# Patient Record
Sex: FEMALE | Race: WHITE | NOT HISPANIC OR LATINO | Employment: FULL TIME | URBAN - METROPOLITAN AREA
[De-identification: names, ages, dates, MRNs, and addresses within clinical notes are randomized per-mention and may not be internally consistent; named-entity substitution may affect disease eponyms.]

---

## 2023-03-09 DIAGNOSIS — E11.65 TYPE 2 DIABETES MELLITUS WITH HYPERGLYCEMIA, WITHOUT LONG-TERM CURRENT USE OF INSULIN (MULTI): ICD-10-CM

## 2023-03-09 RX ORDER — FLASH GLUCOSE SENSOR
1 KIT MISCELLANEOUS
COMMUNITY
Start: 2023-02-17 | End: 2023-03-09 | Stop reason: SDUPTHER

## 2023-03-09 RX ORDER — FLASH GLUCOSE SENSOR
1 KIT MISCELLANEOUS
Qty: 1 EACH | Refills: 3 | Status: SHIPPED | OUTPATIENT
Start: 2023-03-09 | End: 2023-05-22 | Stop reason: SDUPTHER

## 2023-05-09 DIAGNOSIS — Z00.00 HEALTHCARE MAINTENANCE: ICD-10-CM

## 2023-05-09 RX ORDER — GLIMEPIRIDE 1 MG/1
1 TABLET ORAL DAILY
COMMUNITY
End: 2023-05-09 | Stop reason: SDUPTHER

## 2023-05-09 RX ORDER — GLIMEPIRIDE 1 MG/1
1 TABLET ORAL DAILY
Qty: 90 TABLET | Refills: 1 | Status: SHIPPED | OUTPATIENT
Start: 2023-05-09 | End: 2023-12-06

## 2023-05-22 ENCOUNTER — TELEPHONE (OUTPATIENT)
Dept: PRIMARY CARE | Facility: CLINIC | Age: 48
End: 2023-05-22

## 2023-05-22 DIAGNOSIS — E11.65 TYPE 2 DIABETES MELLITUS WITH HYPERGLYCEMIA, WITHOUT LONG-TERM CURRENT USE OF INSULIN (MULTI): ICD-10-CM

## 2023-05-22 RX ORDER — FLASH GLUCOSE SENSOR
1 KIT MISCELLANEOUS
Qty: 1 EACH | Refills: 5 | Status: SHIPPED | OUTPATIENT
Start: 2023-05-22 | End: 2024-03-11 | Stop reason: SDUPTHER

## 2023-08-01 LAB
ALANINE AMINOTRANSFERASE (SGPT) (U/L) IN SER/PLAS: 30 U/L (ref 7–45)
ALBUMIN (G/DL) IN SER/PLAS: 4.2 G/DL (ref 3.4–5)
ALBUMIN (MG/L) IN URINE: 13.8 MG/L
ALBUMIN/CREATININE (UG/MG) IN URINE: 15.8 UG/MG CRT (ref 0–30)
ALKALINE PHOSPHATASE (U/L) IN SER/PLAS: 62 U/L (ref 33–110)
ANION GAP IN SER/PLAS: 14 MMOL/L (ref 10–20)
APPEARANCE, URINE: ABNORMAL
ASPARTATE AMINOTRANSFERASE (SGOT) (U/L) IN SER/PLAS: 19 U/L (ref 9–39)
BACTERIA, URINE: ABNORMAL /HPF
BASOPHILS (10*3/UL) IN BLOOD BY AUTOMATED COUNT: 0.06 X10E9/L (ref 0–0.1)
BASOPHILS/100 LEUKOCYTES IN BLOOD BY AUTOMATED COUNT: 0.6 % (ref 0–2)
BILIRUBIN TOTAL (MG/DL) IN SER/PLAS: 0.3 MG/DL (ref 0–1.2)
BILIRUBIN, URINE: NEGATIVE
BLOOD, URINE: ABNORMAL
CALCIUM (MG/DL) IN SER/PLAS: 9.3 MG/DL (ref 8.6–10.3)
CARBON DIOXIDE, TOTAL (MMOL/L) IN SER/PLAS: 25 MMOL/L (ref 21–32)
CHLORIDE (MMOL/L) IN SER/PLAS: 104 MMOL/L (ref 98–107)
CHOLESTEROL (MG/DL) IN SER/PLAS: 167 MG/DL (ref 0–199)
CHOLESTEROL IN HDL (MG/DL) IN SER/PLAS: 34 MG/DL
CHOLESTEROL/HDL RATIO: 4.9
COLOR, URINE: YELLOW
CREATININE (MG/DL) IN SER/PLAS: 0.83 MG/DL (ref 0.5–1.05)
CREATININE (MG/DL) IN URINE: 87.2 MG/DL (ref 20–320)
EOSINOPHILS (10*3/UL) IN BLOOD BY AUTOMATED COUNT: 0.49 X10E9/L (ref 0–0.7)
EOSINOPHILS/100 LEUKOCYTES IN BLOOD BY AUTOMATED COUNT: 4.9 % (ref 0–6)
ERYTHROCYTE DISTRIBUTION WIDTH (RATIO) BY AUTOMATED COUNT: 15.2 % (ref 11.5–14.5)
ERYTHROCYTE MEAN CORPUSCULAR HEMOGLOBIN CONCENTRATION (G/DL) BY AUTOMATED: 28.3 G/DL (ref 32–36)
ERYTHROCYTE MEAN CORPUSCULAR VOLUME (FL) BY AUTOMATED COUNT: 76 FL (ref 80–100)
ERYTHROCYTES (10*6/UL) IN BLOOD BY AUTOMATED COUNT: 4.93 X10E12/L (ref 4–5.2)
ESTIMATED AVERAGE GLUCOSE FOR HBA1C: 154 MG/DL
GFR FEMALE: 86 ML/MIN/1.73M2
GLUCOSE (MG/DL) IN SER/PLAS: 118 MG/DL (ref 74–99)
GLUCOSE, URINE: NEGATIVE MG/DL
HEMATOCRIT (%) IN BLOOD BY AUTOMATED COUNT: 37.5 % (ref 36–46)
HEMOGLOBIN (G/DL) IN BLOOD: 10.6 G/DL (ref 12–16)
HEMOGLOBIN A1C/HEMOGLOBIN TOTAL IN BLOOD: 7 %
IMMATURE GRANULOCYTES/100 LEUKOCYTES IN BLOOD BY AUTOMATED COUNT: 0.4 % (ref 0–0.9)
KETONES, URINE: NEGATIVE MG/DL
LDL: 57 MG/DL (ref 0–99)
LEUKOCYTE ESTERASE, URINE: ABNORMAL
LEUKOCYTES (10*3/UL) IN BLOOD BY AUTOMATED COUNT: 10.1 X10E9/L (ref 4.4–11.3)
LYMPHOCYTES (10*3/UL) IN BLOOD BY AUTOMATED COUNT: 2.2 X10E9/L (ref 1.2–4.8)
LYMPHOCYTES/100 LEUKOCYTES IN BLOOD BY AUTOMATED COUNT: 21.8 % (ref 13–44)
MONOCYTES (10*3/UL) IN BLOOD BY AUTOMATED COUNT: 0.42 X10E9/L (ref 0.1–1)
MONOCYTES/100 LEUKOCYTES IN BLOOD BY AUTOMATED COUNT: 4.2 % (ref 2–10)
MUCUS, URINE: ABNORMAL /LPF
NEUTROPHILS (10*3/UL) IN BLOOD BY AUTOMATED COUNT: 6.86 X10E9/L (ref 1.2–7.7)
NEUTROPHILS/100 LEUKOCYTES IN BLOOD BY AUTOMATED COUNT: 68.1 % (ref 40–80)
NITRITE, URINE: NEGATIVE
NON HDL CHOLESTEROL: 133 MG/DL
PH, URINE: 5 (ref 5–8)
PLATELETS (10*3/UL) IN BLOOD AUTOMATED COUNT: 370 X10E9/L (ref 150–450)
POTASSIUM (MMOL/L) IN SER/PLAS: 4.1 MMOL/L (ref 3.5–5.3)
PROTEIN TOTAL: 6.4 G/DL (ref 6.4–8.2)
PROTEIN, URINE: NEGATIVE MG/DL
RBC, URINE: 2 /HPF (ref 0–5)
SODIUM (MMOL/L) IN SER/PLAS: 139 MMOL/L (ref 136–145)
SPECIFIC GRAVITY, URINE: 1.01 (ref 1–1.03)
SQUAMOUS EPITHELIAL CELLS, URINE: 20 /HPF
THYROTROPIN (MIU/L) IN SER/PLAS BY DETECTION LIMIT <= 0.05 MIU/L: 1.65 MIU/L (ref 0.44–3.98)
TRIGLYCERIDE (MG/DL) IN SER/PLAS: 380 MG/DL (ref 0–149)
UREA NITROGEN (MG/DL) IN SER/PLAS: 12 MG/DL (ref 6–23)
UROBILINOGEN, URINE: <2 MG/DL (ref 0–1.9)
VLDL: 76 MG/DL (ref 0–40)
WBC, URINE: 5 /HPF (ref 0–5)

## 2023-08-03 ENCOUNTER — OFFICE VISIT (OUTPATIENT)
Dept: PRIMARY CARE | Facility: CLINIC | Age: 48
End: 2023-08-03
Payer: COMMERCIAL

## 2023-08-03 VITALS
DIASTOLIC BLOOD PRESSURE: 82 MMHG | WEIGHT: 195.2 LBS | HEIGHT: 65 IN | BODY MASS INDEX: 32.52 KG/M2 | HEART RATE: 80 BPM | SYSTOLIC BLOOD PRESSURE: 134 MMHG

## 2023-08-03 DIAGNOSIS — Z23 NEED FOR PNEUMOCOCCAL VACCINATION: ICD-10-CM

## 2023-08-03 DIAGNOSIS — E11.65 TYPE 2 DIABETES MELLITUS WITH HYPERGLYCEMIA, WITHOUT LONG-TERM CURRENT USE OF INSULIN (MULTI): ICD-10-CM

## 2023-08-03 DIAGNOSIS — Z23 NEED FOR SHINGLES VACCINE: ICD-10-CM

## 2023-08-03 DIAGNOSIS — J30.2 SEASONAL ALLERGIES: ICD-10-CM

## 2023-08-03 DIAGNOSIS — R25.2 LEG CRAMPS: ICD-10-CM

## 2023-08-03 DIAGNOSIS — D64.9 ANEMIA, UNSPECIFIED TYPE: Primary | ICD-10-CM

## 2023-08-03 DIAGNOSIS — Z12.31 SCREENING MAMMOGRAM FOR BREAST CANCER: ICD-10-CM

## 2023-08-03 PROBLEM — K44.9 HIATAL HERNIA WITH GERD: Status: ACTIVE | Noted: 2023-08-03

## 2023-08-03 PROBLEM — K21.9 HIATAL HERNIA WITH GERD: Status: ACTIVE | Noted: 2023-08-03

## 2023-08-03 PROBLEM — Z86.59 HISTORY OF DEPRESSION: Status: ACTIVE | Noted: 2023-08-03

## 2023-08-03 PROBLEM — E78.5 HYPERLIPIDEMIA: Status: ACTIVE | Noted: 2023-08-03

## 2023-08-03 PROCEDURE — 99214 OFFICE O/P EST MOD 30 MIN: CPT | Performed by: FAMILY MEDICINE

## 2023-08-03 PROCEDURE — 3051F HG A1C>EQUAL 7.0%<8.0%: CPT | Performed by: FAMILY MEDICINE

## 2023-08-03 PROCEDURE — 4010F ACE/ARB THERAPY RXD/TAKEN: CPT | Performed by: FAMILY MEDICINE

## 2023-08-03 PROCEDURE — 3079F DIAST BP 80-89 MM HG: CPT | Performed by: FAMILY MEDICINE

## 2023-08-03 PROCEDURE — 90677 PCV20 VACCINE IM: CPT | Performed by: FAMILY MEDICINE

## 2023-08-03 PROCEDURE — 90472 IMMUNIZATION ADMIN EACH ADD: CPT | Performed by: FAMILY MEDICINE

## 2023-08-03 PROCEDURE — 3075F SYST BP GE 130 - 139MM HG: CPT | Performed by: FAMILY MEDICINE

## 2023-08-03 PROCEDURE — 1036F TOBACCO NON-USER: CPT | Performed by: FAMILY MEDICINE

## 2023-08-03 PROCEDURE — 90471 IMMUNIZATION ADMIN: CPT | Performed by: FAMILY MEDICINE

## 2023-08-03 PROCEDURE — 90750 HZV VACC RECOMBINANT IM: CPT | Performed by: FAMILY MEDICINE

## 2023-08-03 RX ORDER — CETIRIZINE HYDROCHLORIDE 10 MG/1
10 TABLET ORAL DAILY
COMMUNITY
End: 2023-08-03 | Stop reason: SDUPTHER

## 2023-08-03 RX ORDER — CETIRIZINE HYDROCHLORIDE 10 MG/1
10 TABLET ORAL DAILY
Qty: 90 TABLET | Refills: 3 | Status: SHIPPED | OUTPATIENT
Start: 2023-08-03 | End: 2023-12-20 | Stop reason: SDUPTHER

## 2023-08-03 RX ORDER — ALBUTEROL SULFATE 90 UG/1
AEROSOL, METERED RESPIRATORY (INHALATION)
COMMUNITY
End: 2024-03-22 | Stop reason: WASHOUT

## 2023-08-03 ASSESSMENT — ENCOUNTER SYMPTOMS: SHORTNESS OF BREATH: 0

## 2023-08-03 NOTE — PATIENT INSTRUCTIONS
Routine followup six months, labs prior.   Please call any concerns.   Shingrix number one today, Prevnar 20 today.  Anticipate shingrix number two at six month visit.  Labs today for anemia workup and magnesium level, will call results and further instructions.    FOBT dispensed.

## 2023-08-03 NOTE — PROGRESS NOTES
Patient presents for periodic surveillance of chronic medical problems.     Subjective  Nubia Martinez is a 48 y.o. female who presents for Annual Exam.  HPI  Having troubles with leg cramps. Mostly in the morning while in bed.  Discussed hydration, calcium/magnesium supplements, tonic water help some people.  Will check magnesium level as well.   Interested in having all her vitamin levels checked.  States has noticed decreased energy.  Sleep swells usually, does snore, does not feel well rested.  Recommend sleep testing, she declines for now. Regarding vitamin levels, advised will cost likely thousands as likely won't be covered by insurance.    DM well controlled A1c 7  Insect bites,states recurrent bites, thinks spider not sure what's biting her, was in ER once.  Itching and swelling lasts a week.    New anemia, mild, will workup, had colonoscopy 11/22.  Takes ibuprofen /aleve occasionally.  No blood in stool, no dark tarry stools noted.   Has pain in left shoulder at times, sleeps on that side. Will monitor.    Review of Systems   Respiratory:  Negative for shortness of breath.    Cardiovascular:  Negative for chest pain.   All other systems reviewed and are negative.  .    Objective     Visit Vitals  /82 (BP Location: Left arm, Patient Position: Sitting)   Pulse 80      Physical Exam  Vitals and nursing note reviewed.   Constitutional:       General: She is not in acute distress.     Appearance: Normal appearance. She is not toxic-appearing.   HENT:      Head: Normocephalic and atraumatic.   Cardiovascular:      Rate and Rhythm: Normal rate and regular rhythm.      Heart sounds: No murmur heard.  Pulmonary:      Effort: Pulmonary effort is normal.      Breath sounds: Normal breath sounds.   Abdominal:      Palpations: Abdomen is soft.   Musculoskeletal:      Cervical back: Neck supple. No rigidity.      Comments:     Skin:     General: Skin is warm and dry.   Neurological:      General: No focal deficit  present.      Mental Status: She is alert and oriented to person, place, and time.   Psychiatric:         Mood and Affect: Mood normal.         Behavior: Behavior normal.         Assessment/Plan   Problem List Items Addressed This Visit       Type 2 diabetes mellitus with hyperglycemia (CMS/HCC)    Relevant Orders    Follow Up In Primary Care - Established     Other Visit Diagnoses       Anemia, unspecified type    -  Primary    Relevant Orders    CBC and Auto Differential    Iron and TIBC    Vitamin B12    Leg cramps        Relevant Orders    Magnesium    Screening mammogram for breast cancer        Relevant Orders    BI mammo bilateral screening tomosynthesis    Need for shingles vaccine        Relevant Orders    Zoster vaccine, recombinant, adult (SHINGRIX)    Need for pneumococcal vaccination        Relevant Orders    Pneumococcal conjugate vaccine, 20-valent, adult (PREVNAR 20)                   Lisandra Denney MD

## 2023-08-10 ENCOUNTER — CLINICAL SUPPORT (OUTPATIENT)
Dept: PRIMARY CARE | Facility: CLINIC | Age: 48
End: 2023-08-10
Payer: COMMERCIAL

## 2023-08-10 ENCOUNTER — LAB (OUTPATIENT)
Dept: LAB | Facility: LAB | Age: 48
End: 2023-08-10
Payer: COMMERCIAL

## 2023-08-10 DIAGNOSIS — D64.9 ANEMIA, UNSPECIFIED TYPE: ICD-10-CM

## 2023-08-10 DIAGNOSIS — Z12.11 SCREENING FOR COLON CANCER: ICD-10-CM

## 2023-08-10 DIAGNOSIS — R25.2 LEG CRAMPS: ICD-10-CM

## 2023-08-10 LAB
BASOPHILS (10*3/UL) IN BLOOD BY AUTOMATED COUNT: 0.07 X10E9/L (ref 0–0.1)
BASOPHILS/100 LEUKOCYTES IN BLOOD BY AUTOMATED COUNT: 0.7 % (ref 0–2)
COBALAMIN (VITAMIN B12) (PG/ML) IN SER/PLAS: 181 PG/ML (ref 211–911)
EOSINOPHILS (10*3/UL) IN BLOOD BY AUTOMATED COUNT: 0.35 X10E9/L (ref 0–0.7)
EOSINOPHILS/100 LEUKOCYTES IN BLOOD BY AUTOMATED COUNT: 3.4 % (ref 0–6)
ERYTHROCYTE DISTRIBUTION WIDTH (RATIO) BY AUTOMATED COUNT: 15.9 % (ref 11.5–14.5)
ERYTHROCYTE MEAN CORPUSCULAR HEMOGLOBIN CONCENTRATION (G/DL) BY AUTOMATED: 28.7 G/DL (ref 32–36)
ERYTHROCYTE MEAN CORPUSCULAR VOLUME (FL) BY AUTOMATED COUNT: 76 FL (ref 80–100)
ERYTHROCYTES (10*6/UL) IN BLOOD BY AUTOMATED COUNT: 4.71 X10E12/L (ref 4–5.2)
HEMATOCRIT (%) IN BLOOD BY AUTOMATED COUNT: 35.6 % (ref 36–46)
HEMOGLOBIN (G/DL) IN BLOOD: 10.2 G/DL (ref 12–16)
IMMATURE GRANULOCYTES/100 LEUKOCYTES IN BLOOD BY AUTOMATED COUNT: 0.5 % (ref 0–0.9)
IRON (UG/DL) IN SER/PLAS: 25 UG/DL (ref 35–150)
IRON BINDING CAPACITY (UG/DL) IN SER/PLAS: 429 UG/DL (ref 240–445)
IRON SATURATION (%) IN SER/PLAS: 6 % (ref 25–45)
LEUKOCYTES (10*3/UL) IN BLOOD BY AUTOMATED COUNT: 10.3 X10E9/L (ref 4.4–11.3)
LYMPHOCYTES (10*3/UL) IN BLOOD BY AUTOMATED COUNT: 2.11 X10E9/L (ref 1.2–4.8)
LYMPHOCYTES/100 LEUKOCYTES IN BLOOD BY AUTOMATED COUNT: 20.5 % (ref 13–44)
MAGNESIUM (MG/DL) IN SER/PLAS: 1.85 MG/DL (ref 1.6–2.4)
MONOCYTES (10*3/UL) IN BLOOD BY AUTOMATED COUNT: 0.61 X10E9/L (ref 0.1–1)
MONOCYTES/100 LEUKOCYTES IN BLOOD BY AUTOMATED COUNT: 5.9 % (ref 2–10)
NEUTROPHILS (10*3/UL) IN BLOOD BY AUTOMATED COUNT: 7.08 X10E9/L (ref 1.2–7.7)
NEUTROPHILS/100 LEUKOCYTES IN BLOOD BY AUTOMATED COUNT: 69 % (ref 40–80)
PLATELETS (10*3/UL) IN BLOOD AUTOMATED COUNT: 321 X10E9/L (ref 150–450)
POC FECAL OCCULT BLOOD IMMUNOASSAY: NEGATIVE

## 2023-08-10 PROCEDURE — 83540 ASSAY OF IRON: CPT

## 2023-08-10 PROCEDURE — 36415 COLL VENOUS BLD VENIPUNCTURE: CPT

## 2023-08-10 PROCEDURE — 82607 VITAMIN B-12: CPT

## 2023-08-10 PROCEDURE — 83735 ASSAY OF MAGNESIUM: CPT

## 2023-08-10 PROCEDURE — 85025 COMPLETE CBC W/AUTO DIFF WBC: CPT

## 2023-08-10 PROCEDURE — 83550 IRON BINDING TEST: CPT

## 2023-08-10 PROCEDURE — 82274 ASSAY TEST FOR BLOOD FECAL: CPT | Performed by: FAMILY MEDICINE

## 2023-08-22 ENCOUNTER — TELEPHONE (OUTPATIENT)
Dept: PRIMARY CARE | Facility: CLINIC | Age: 48
End: 2023-08-22
Payer: COMMERCIAL

## 2023-08-22 NOTE — TELEPHONE ENCOUNTER
LM stating she got the message from PENNIE in the portal and started the iron and B12. States her menses started recently and is very heavy; states has been heavy lately. States she was doing some reading and read about menorrhagia and how it can cause anemia; wonders if this might be the case with her? Wonders if she needs to see a GYN?

## 2023-08-22 NOTE — TELEPHONE ENCOUNTER
Yes, that is a possible cause, she should see GYN.  I show she sees Dr Guillen so she can just call and make an appt for herself.

## 2023-09-13 ENCOUNTER — LAB (OUTPATIENT)
Dept: LAB | Facility: LAB | Age: 48
End: 2023-09-13
Payer: COMMERCIAL

## 2023-09-13 DIAGNOSIS — D64.9 ANEMIA, UNSPECIFIED TYPE: ICD-10-CM

## 2023-09-13 LAB
BASOPHILS (10*3/UL) IN BLOOD BY AUTOMATED COUNT: 0.04 X10E9/L (ref 0–0.1)
BASOPHILS/100 LEUKOCYTES IN BLOOD BY AUTOMATED COUNT: 0.4 % (ref 0–2)
COBALAMIN (VITAMIN B12) (PG/ML) IN SER/PLAS: 345 PG/ML (ref 211–911)
EOSINOPHILS (10*3/UL) IN BLOOD BY AUTOMATED COUNT: 0.41 X10E9/L (ref 0–0.7)
EOSINOPHILS/100 LEUKOCYTES IN BLOOD BY AUTOMATED COUNT: 4.5 % (ref 0–6)
ERYTHROCYTE DISTRIBUTION WIDTH (RATIO) BY AUTOMATED COUNT: 17.8 % (ref 11.5–14.5)
ERYTHROCYTE MEAN CORPUSCULAR HEMOGLOBIN CONCENTRATION (G/DL) BY AUTOMATED: 28.5 G/DL (ref 32–36)
ERYTHROCYTE MEAN CORPUSCULAR VOLUME (FL) BY AUTOMATED COUNT: 76 FL (ref 80–100)
ERYTHROCYTES (10*6/UL) IN BLOOD BY AUTOMATED COUNT: 4.74 X10E12/L (ref 4–5.2)
HEMATOCRIT (%) IN BLOOD BY AUTOMATED COUNT: 36.2 % (ref 36–46)
HEMOGLOBIN (G/DL) IN BLOOD: 10.3 G/DL (ref 12–16)
IMMATURE GRANULOCYTES/100 LEUKOCYTES IN BLOOD BY AUTOMATED COUNT: 0.3 % (ref 0–0.9)
IRON (UG/DL) IN SER/PLAS: 35 UG/DL (ref 35–150)
IRON BINDING CAPACITY (UG/DL) IN SER/PLAS: 421 UG/DL (ref 240–445)
IRON SATURATION (%) IN SER/PLAS: 8 % (ref 25–45)
LEUKOCYTES (10*3/UL) IN BLOOD BY AUTOMATED COUNT: 9.2 X10E9/L (ref 4.4–11.3)
LYMPHOCYTES (10*3/UL) IN BLOOD BY AUTOMATED COUNT: 2.14 X10E9/L (ref 1.2–4.8)
LYMPHOCYTES/100 LEUKOCYTES IN BLOOD BY AUTOMATED COUNT: 23.3 % (ref 13–44)
MONOCYTES (10*3/UL) IN BLOOD BY AUTOMATED COUNT: 0.47 X10E9/L (ref 0.1–1)
MONOCYTES/100 LEUKOCYTES IN BLOOD BY AUTOMATED COUNT: 5.1 % (ref 2–10)
NEUTROPHILS (10*3/UL) IN BLOOD BY AUTOMATED COUNT: 6.11 X10E9/L (ref 1.2–7.7)
NEUTROPHILS/100 LEUKOCYTES IN BLOOD BY AUTOMATED COUNT: 66.4 % (ref 40–80)
PLATELETS (10*3/UL) IN BLOOD AUTOMATED COUNT: 335 X10E9/L (ref 150–450)

## 2023-09-13 PROCEDURE — 83540 ASSAY OF IRON: CPT

## 2023-09-13 PROCEDURE — 82607 VITAMIN B-12: CPT

## 2023-09-13 PROCEDURE — 85025 COMPLETE CBC W/AUTO DIFF WBC: CPT

## 2023-09-13 PROCEDURE — 36415 COLL VENOUS BLD VENIPUNCTURE: CPT

## 2023-09-13 PROCEDURE — 83550 IRON BINDING TEST: CPT

## 2023-10-06 PROBLEM — Z86.59 PERSONAL HISTORY OF AFFECTIVE DISORDER: Status: ACTIVE | Noted: 2023-10-06

## 2023-10-06 PROBLEM — Z87.19 HISTORY OF ESOPHAGEAL STRICTURE: Status: ACTIVE | Noted: 2023-10-06

## 2023-10-06 PROBLEM — N92.0 MENORRHAGIA: Status: ACTIVE | Noted: 2023-10-06

## 2023-10-06 RX ORDER — MONTELUKAST SODIUM 10 MG/1
10 TABLET ORAL DAILY
COMMUNITY
End: 2024-02-19

## 2023-10-06 RX ORDER — FLUTICASONE PROPIONATE 50 MCG
2 SPRAY, SUSPENSION (ML) NASAL DAILY
COMMUNITY

## 2023-10-06 RX ORDER — DROSPIRENONE AND ETHINYL ESTRADIOL 0.03MG-3MG
1 KIT ORAL DAILY
COMMUNITY
Start: 2023-09-13 | End: 2023-10-27 | Stop reason: SDUPTHER

## 2023-10-09 ENCOUNTER — PREP FOR PROCEDURE (OUTPATIENT)
Dept: OBSTETRICS AND GYNECOLOGY | Facility: CLINIC | Age: 48
End: 2023-10-09

## 2023-10-09 ENCOUNTER — OFFICE VISIT (OUTPATIENT)
Dept: OBSTETRICS AND GYNECOLOGY | Facility: CLINIC | Age: 48
End: 2023-10-09
Payer: COMMERCIAL

## 2023-10-09 ENCOUNTER — HOSPITAL ENCOUNTER (OUTPATIENT)
Facility: HOSPITAL | Age: 48
Setting detail: OUTPATIENT SURGERY
End: 2023-10-09
Attending: OBSTETRICS & GYNECOLOGY | Admitting: OBSTETRICS & GYNECOLOGY
Payer: COMMERCIAL

## 2023-10-09 VITALS
SYSTOLIC BLOOD PRESSURE: 108 MMHG | HEIGHT: 65 IN | DIASTOLIC BLOOD PRESSURE: 86 MMHG | BODY MASS INDEX: 31.16 KG/M2 | WEIGHT: 187 LBS

## 2023-10-09 DIAGNOSIS — N92.0 MENORRHAGIA WITH REGULAR CYCLE: Primary | ICD-10-CM

## 2023-10-09 PROCEDURE — 1036F TOBACCO NON-USER: CPT | Performed by: OBSTETRICS & GYNECOLOGY

## 2023-10-09 PROCEDURE — 3051F HG A1C>EQUAL 7.0%<8.0%: CPT | Performed by: OBSTETRICS & GYNECOLOGY

## 2023-10-09 PROCEDURE — 4010F ACE/ARB THERAPY RXD/TAKEN: CPT | Performed by: OBSTETRICS & GYNECOLOGY

## 2023-10-09 PROCEDURE — 99213 OFFICE O/P EST LOW 20 MIN: CPT | Performed by: OBSTETRICS & GYNECOLOGY

## 2023-10-09 PROCEDURE — 3074F SYST BP LT 130 MM HG: CPT | Performed by: OBSTETRICS & GYNECOLOGY

## 2023-10-09 PROCEDURE — 3079F DIAST BP 80-89 MM HG: CPT | Performed by: OBSTETRICS & GYNECOLOGY

## 2023-10-09 RX ORDER — CELECOXIB 50 MG/1
400 CAPSULE ORAL ONCE
Status: DISCONTINUED | OUTPATIENT
Start: 2023-10-09 | End: 2024-03-04

## 2023-10-09 RX ORDER — ACETAMINOPHEN 325 MG/1
975 TABLET ORAL ONCE
Status: DISCONTINUED | OUTPATIENT
Start: 2023-10-09 | End: 2024-03-04

## 2023-10-09 RX ORDER — GABAPENTIN 600 MG/1
600 TABLET ORAL ONCE
Status: DISCONTINUED | OUTPATIENT
Start: 2023-10-09 | End: 2024-03-04

## 2023-10-09 NOTE — H&P (VIEW-ONLY)
Nubia Martinez is a 48 y.o. year old female patient.     Chief Complaint   Patient presents with    Pre-op Visit     Patient presents today for preop visit for hysteroscopy, dilatation and curettage and endometrial ablation.          HPI   Presents for preop visit in anticipation of endometrial ablation. She voices no complaints and is doing well. Denies any bowel or bladder problems. Denies any breast problems.  had a vasectomy.  Patient was started on birth control pills in anticipation of the ablation.  Endometrial biopsy could not be performed in the office several weeks ago.  She is currently on her menstrual flow as she is on the placebo week.    OB History          2    Para   2    Term                AB        Living   2         SAB        IAB        Ectopic        Multiple        Live Births                     Past Medical History:   Diagnosis Date    Encounter for  delivery without indication     Delivery of pregnancy by  section    Encounter for gynecological examination (general) (routine) without abnormal findings 2021    Pap test, as part of routine gynecological examination    Hyperlipidemia, unspecified 10/20/2022    Hyperlipidemia    Other conditions influencing health status     Menstruation    Other recurrent depressive disorders (CMS/[facility]) 01/15/2020    Seasonal affective disorder    Other specified abnormal findings of blood chemistry 2022    Low vitamin D level       Past Surgical History:   Procedure Laterality Date    OTHER SURGICAL HISTORY  2020     section    OTHER SURGICAL HISTORY  2020    Appendectomy    OTHER SURGICAL HISTORY  2020    Esophagogastroduodenoscopy    OTHER SURGICAL HISTORY  2020    Hysteroscopy       Review of Systems:   Constitutional: No fever or chills  Respiratory: No shortness of breath, or cough  Cardiovascular: No chest pain or syncope  Breasts: No breast pain, no masses, no nipple  discharge  Gastrointestinal: No nausea, vomiting, or diarrhea, no abdominal pain  Genitourinary: No dysuria or frequency  Gynecology: Negative except as noted in history of present illness  All other: All other systems reviewed and negative for complaint    Medication Documentation Review Audit       Reviewed by Alf Guillen MD (Physician) on 10/09/23 at 1146      Medication Order Taking? Sig Documenting Provider Last Dose Status   albuterol 90 mcg/actuation inhaler [id number] Yes INHALE 2 PUFFS FOUR TIMES DAILY AS NEEDED Historical Provider, MD  Active   atorvastatin (Lipitor) 40 mg tablet [id number] Yes TAKE ONE TABLET BY MOUTH ONCE DAILY Lisandra Denney MD  Active   cetirizine (ZyrTEC) 10 mg tablet [id number] Yes Take 1 tablet (10 mg) by mouth once daily.   Patient taking differently: Take 1 tablet (10 mg) by mouth once daily as needed.    Lisandra Denney MD  Active   drospirenone-ethinyl estradioL (Lula, Ocella) 3-0.03 mg tablet [id number] Yes Take 1 tablet by mouth once daily. Historical Provider, MD  Active   fluticasone (Flonase) 50 mcg/actuation nasal spray [id number] Yes Administer 2 sprays into each nostril once daily. Shake gently. Before first use, prime pump. After use, clean tip and replace cap. Historical Provider, MD  Active   FreeStyle Marciano sensor system (FreeStyle Marciano 14 Day Sensor) kit [id number]  Inject 1 each under the skin every 14 (fourteen) days. Lisandra Denney MD  Active   glimepiride (Amaryl) 1 mg tablet [id number] Yes Take 1 tablet (1 mg) by mouth once daily. Lisandra Denney MD  Active   losartan (Cozaar) 25 mg tablet [id number] Yes TAKE ONE TABLET BY MOUTH ONCE DAILY AS DIRECTED Lisandra Denney MD  Active   metFORMIN XR (Glucophage-XR) 500 mg 24 hr tablet [id number] Yes TAKE FOUR TABLETS BY MOUTH ONCE DAILY Lisandra Denney MD  Active   montelukast (Singulair) 10 mg tablet [id number] Yes Take 1 tablet (10 mg) by mouth once daily. Historical Provider, MD  Active   omeprazole (PriLOSEC) 40 mg DR capsule  "[id number] Yes TAKE ONE CAPSULE BY MOUTH ONCE DAILY Lisandra Denney MD  Active                     /86   Ht 1.651 m (5' 5\")   Wt 84.8 kg (187 lb)   LMP 10/07/2023   BMI 31.12 kg/m²     PHYSICAL EXAMINATION:  Well-developed, well nourished, in no acute distress, alert and oriented x three, is pleasant and cooperative.   HEENT: Clear. Pupils equal, round and reactive to light and accommodation. Extraocular muscles are intact. Oral mucosa pink without exudate.   NECK: No lymphadenopathy, no thyromegaly.  BREASTS: Symmetric, no palpable masses. No nipple discharge or retraction.  LUNGS: Clear bilaterally.  HEART: Regular rate and rhythm without murmurs.  ABDOMEN: Normoactive bowel sounds, soft and nontender, no guarding or rebound tenderness, no CVA tenderness.  EXTREMITIES: No clubbing, cyanosis or edema.  NEUROLOGIC:  Cranial nerves II-XII grossly intact.    Lab Results   Component Value Date    WBC 9.2 09/13/2023    HGB 10.3 (L) 09/13/2023    HCT 36.2 09/13/2023     09/13/2023    CHOL 167 08/01/2023    TRIG 380 (H) 08/01/2023    HDL 34.0 (A) 08/01/2023    ALT 30 08/01/2023    AST 19 08/01/2023     08/01/2023    K 4.1 08/01/2023     08/01/2023    CREATININE 0.83 08/01/2023    BUN 12 08/01/2023    CO2 25 08/01/2023    TSH 1.65 08/01/2023    HGBA1C 7.0 (A) 08/01/2023       1.  Menorrhagia  Plan hysteroscopy, dilatation and curettage and endometrial ablation.  Patient informed of risks of surgery including risk of anesthesia, infection, bleeding, injury to internal organs including bowel, bladder or ureter, uterine perforation or fistula formation. Her questions were answered to her satisfaction and she agrees to undergo the procedure.  Patient to continue with the birth control pills up until the time of surgery.  Will obtain CBC and serum hCG days prior to surgery.  Plan to obtain endometrial curettings at time of surgery since the endometrial biopsy could not be performed in the office.    "

## 2023-10-09 NOTE — PROGRESS NOTES
Nubia Martinez is a 48 y.o. year old female patient.     Chief Complaint   Patient presents with    Pre-op Visit     Patient presents today for preop visit for hysteroscopy, dilatation and curettage and endometrial ablation.          HPI   Presents for preop visit in anticipation of endometrial ablation. She voices no complaints and is doing well. Denies any bowel or bladder problems. Denies any breast problems.  had a vasectomy.  Patient was started on birth control pills in anticipation of the ablation.  Endometrial biopsy could not be performed in the office several weeks ago.  She is currently on her menstrual flow as she is on the placebo week.    OB History          2    Para   2    Term                AB        Living   2         SAB        IAB        Ectopic        Multiple        Live Births                     Past Medical History:   Diagnosis Date    Encounter for  delivery without indication     Delivery of pregnancy by  section    Encounter for gynecological examination (general) (routine) without abnormal findings 2021    Pap test, as part of routine gynecological examination    Hyperlipidemia, unspecified 10/20/2022    Hyperlipidemia    Other conditions influencing health status     Menstruation    Other recurrent depressive disorders (CMS/[facility]) 01/15/2020    Seasonal affective disorder    Other specified abnormal findings of blood chemistry 2022    Low vitamin D level       Past Surgical History:   Procedure Laterality Date    OTHER SURGICAL HISTORY  2020     section    OTHER SURGICAL HISTORY  2020    Appendectomy    OTHER SURGICAL HISTORY  2020    Esophagogastroduodenoscopy    OTHER SURGICAL HISTORY  2020    Hysteroscopy       Review of Systems:   Constitutional: No fever or chills  Respiratory: No shortness of breath, or cough  Cardiovascular: No chest pain or syncope  Breasts: No breast pain, no masses, no nipple  discharge  Gastrointestinal: No nausea, vomiting, or diarrhea, no abdominal pain  Genitourinary: No dysuria or frequency  Gynecology: Negative except as noted in history of present illness  All other: All other systems reviewed and negative for complaint    Medication Documentation Review Audit       Reviewed by Alf Guillen MD (Physician) on 10/09/23 at 1146      Medication Order Taking? Sig Documenting Provider Last Dose Status   albuterol 90 mcg/actuation inhaler [id number] Yes INHALE 2 PUFFS FOUR TIMES DAILY AS NEEDED Historical Provider, MD  Active   atorvastatin (Lipitor) 40 mg tablet [id number] Yes TAKE ONE TABLET BY MOUTH ONCE DAILY Lisandra Denney MD  Active   cetirizine (ZyrTEC) 10 mg tablet [id number] Yes Take 1 tablet (10 mg) by mouth once daily.   Patient taking differently: Take 1 tablet (10 mg) by mouth once daily as needed.    Lisandra Denney MD  Active   drospirenone-ethinyl estradioL (Lula, Ocella) 3-0.03 mg tablet [id number] Yes Take 1 tablet by mouth once daily. Historical Provider, MD  Active   fluticasone (Flonase) 50 mcg/actuation nasal spray [id number] Yes Administer 2 sprays into each nostril once daily. Shake gently. Before first use, prime pump. After use, clean tip and replace cap. Historical Provider, MD  Active   FreeStyle Marciano sensor system (FreeStyle Marciano 14 Day Sensor) kit [id number]  Inject 1 each under the skin every 14 (fourteen) days. Lisandra Denney MD  Active   glimepiride (Amaryl) 1 mg tablet [id number] Yes Take 1 tablet (1 mg) by mouth once daily. Lisandra Denney MD  Active   losartan (Cozaar) 25 mg tablet [id number] Yes TAKE ONE TABLET BY MOUTH ONCE DAILY AS DIRECTED Lisandra Denney MD  Active   metFORMIN XR (Glucophage-XR) 500 mg 24 hr tablet [id number] Yes TAKE FOUR TABLETS BY MOUTH ONCE DAILY Lisandra Denney MD  Active   montelukast (Singulair) 10 mg tablet [id number] Yes Take 1 tablet (10 mg) by mouth once daily. Historical Provider, MD  Active   omeprazole (PriLOSEC) 40 mg DR capsule  "[id number] Yes TAKE ONE CAPSULE BY MOUTH ONCE DAILY Lisandra Denney MD  Active                     /86   Ht 1.651 m (5' 5\")   Wt 84.8 kg (187 lb)   LMP 10/07/2023   BMI 31.12 kg/m²     PHYSICAL EXAMINATION:  Well-developed, well nourished, in no acute distress, alert and oriented x three, is pleasant and cooperative.   HEENT: Clear. Pupils equal, round and reactive to light and accommodation. Extraocular muscles are intact. Oral mucosa pink without exudate.   NECK: No lymphadenopathy, no thyromegaly.  BREASTS: Symmetric, no palpable masses. No nipple discharge or retraction.  LUNGS: Clear bilaterally.  HEART: Regular rate and rhythm without murmurs.  ABDOMEN: Normoactive bowel sounds, soft and nontender, no guarding or rebound tenderness, no CVA tenderness.  EXTREMITIES: No clubbing, cyanosis or edema.  NEUROLOGIC:  Cranial nerves II-XII grossly intact.    Lab Results   Component Value Date    WBC 9.2 09/13/2023    HGB 10.3 (L) 09/13/2023    HCT 36.2 09/13/2023     09/13/2023    CHOL 167 08/01/2023    TRIG 380 (H) 08/01/2023    HDL 34.0 (A) 08/01/2023    ALT 30 08/01/2023    AST 19 08/01/2023     08/01/2023    K 4.1 08/01/2023     08/01/2023    CREATININE 0.83 08/01/2023    BUN 12 08/01/2023    CO2 25 08/01/2023    TSH 1.65 08/01/2023    HGBA1C 7.0 (A) 08/01/2023       1.  Menorrhagia  Plan hysteroscopy, dilatation and curettage and endometrial ablation.  Patient informed of risks of surgery including risk of anesthesia, infection, bleeding, injury to internal organs including bowel, bladder or ureter, uterine perforation or fistula formation. Her questions were answered to her satisfaction and she agrees to undergo the procedure.  Patient to continue with the birth control pills up until the time of surgery.  Will obtain CBC and serum hCG days prior to surgery.  Plan to obtain endometrial curettings at time of surgery since the endometrial biopsy could not be performed in the office.    "

## 2023-10-10 ENCOUNTER — PREP FOR PROCEDURE (OUTPATIENT)
Dept: OBSTETRICS AND GYNECOLOGY | Facility: CLINIC | Age: 48
End: 2023-10-10
Payer: COMMERCIAL

## 2023-10-10 DIAGNOSIS — N92.0 MENORRHAGIA WITH REGULAR CYCLE: Primary | ICD-10-CM

## 2023-10-10 RX ORDER — GABAPENTIN 600 MG/1
600 TABLET ORAL ONCE
Status: DISCONTINUED | OUTPATIENT
Start: 2023-10-10 | End: 2024-03-04

## 2023-10-10 RX ORDER — ACETAMINOPHEN 325 MG/1
975 TABLET ORAL ONCE
Status: DISCONTINUED | OUTPATIENT
Start: 2023-10-10 | End: 2024-03-04

## 2023-10-10 RX ORDER — CELECOXIB 50 MG/1
400 CAPSULE ORAL ONCE
Status: DISCONTINUED | OUTPATIENT
Start: 2023-10-10 | End: 2024-03-04

## 2023-10-11 ENCOUNTER — PREP FOR PROCEDURE (OUTPATIENT)
Dept: OBSTETRICS AND GYNECOLOGY | Facility: CLINIC | Age: 48
End: 2023-10-11

## 2023-10-11 ENCOUNTER — OFFICE VISIT (OUTPATIENT)
Dept: PRIMARY CARE | Facility: CLINIC | Age: 48
End: 2023-10-11
Payer: COMMERCIAL

## 2023-10-11 VITALS
DIASTOLIC BLOOD PRESSURE: 66 MMHG | HEART RATE: 88 BPM | SYSTOLIC BLOOD PRESSURE: 126 MMHG | HEIGHT: 65 IN | WEIGHT: 190 LBS | BODY MASS INDEX: 31.65 KG/M2

## 2023-10-11 DIAGNOSIS — R05.1 ACUTE COUGH: Primary | ICD-10-CM

## 2023-10-11 DIAGNOSIS — E11.65 TYPE 2 DIABETES MELLITUS WITH HYPERGLYCEMIA, WITHOUT LONG-TERM CURRENT USE OF INSULIN (MULTI): ICD-10-CM

## 2023-10-11 PROCEDURE — 99213 OFFICE O/P EST LOW 20 MIN: CPT | Performed by: FAMILY MEDICINE

## 2023-10-11 PROCEDURE — 3074F SYST BP LT 130 MM HG: CPT | Performed by: FAMILY MEDICINE

## 2023-10-11 PROCEDURE — 3051F HG A1C>EQUAL 7.0%<8.0%: CPT | Performed by: FAMILY MEDICINE

## 2023-10-11 PROCEDURE — 4010F ACE/ARB THERAPY RXD/TAKEN: CPT | Performed by: FAMILY MEDICINE

## 2023-10-11 PROCEDURE — 3078F DIAST BP <80 MM HG: CPT | Performed by: FAMILY MEDICINE

## 2023-10-11 PROCEDURE — 1036F TOBACCO NON-USER: CPT | Performed by: FAMILY MEDICINE

## 2023-10-11 RX ORDER — ACETAMINOPHEN 325 MG/1
975 TABLET ORAL ONCE
Status: DISCONTINUED | OUTPATIENT
Start: 2023-10-11 | End: 2024-03-04

## 2023-10-11 RX ORDER — CELECOXIB 50 MG/1
400 CAPSULE ORAL ONCE
Status: DISCONTINUED | OUTPATIENT
Start: 2023-10-11 | End: 2024-03-04

## 2023-10-11 RX ORDER — BUDESONIDE AND FORMOTEROL FUMARATE DIHYDRATE 80; 4.5 UG/1; UG/1
2 AEROSOL RESPIRATORY (INHALATION)
Qty: 1 EACH | Refills: 1 | Status: SHIPPED | OUTPATIENT
Start: 2023-10-11 | End: 2023-12-20 | Stop reason: WASHOUT

## 2023-10-11 RX ORDER — GABAPENTIN 600 MG/1
600 TABLET ORAL ONCE
Status: DISCONTINUED | OUTPATIENT
Start: 2023-10-11 | End: 2024-03-04

## 2023-10-11 NOTE — PATIENT INSTRUCTIONS
I think this is most likely allergic or viral.  Do not feel an antibiotic is going to help at this point.  Recommend resume flonase and monteleukast, stay on zyrtec, and if not improving add inhaled steroid as we discussed.

## 2023-10-11 NOTE — PROGRESS NOTES
"Patient presents for periodic surveillance of chronic medical problems.     Subjective  Nubia Martinez is a 48 y.o. female who presents for Cough.  HPI  Two weeks of not feeling well.  Cough. Started with itchy then sore throat.  Nasal congestion, watery eyes.  Cough persisted.  Started to feel better, then relapsed.  Felt \"frog\" in throat and cough.  Cough became productive.  Last night she coughed all night long. Dry cough now.   Feels slightly short of breath . Some sneezing.  Tends to get sick Jan/February, not this time of year.  Had influenza vaccine this season.  On zyrtec, not using flonase and singulair.  Has been using her inhaler twice daily  Saw DR Guillen this week for pre op for d and c and ablation 10/26.  Recommend get back on flonase and Singulair.  Recommend short course of oral steroids, she has not tolerated in the past with elevated glucoses.  Can try inhaled. steroid, advised more expensive.  Can resume flonase and singulair, and if not improving add the inhaled steroid.      Review of Systems   All other systems reviewed and are negative.  .    Objective     Visit Vitals  /66 (BP Location: Left arm, Patient Position: Sitting)   Pulse 88      Physical Exam  Vitals and nursing note reviewed.   Constitutional:       General: She is not in acute distress.     Appearance: Normal appearance. She is not toxic-appearing.   HENT:      Head: Normocephalic and atraumatic.      Mouth/Throat:      Pharynx: No oropharyngeal exudate or posterior oropharyngeal erythema.      Comments: Clear drainage  Cardiovascular:      Rate and Rhythm: Normal rate and regular rhythm.      Heart sounds: No murmur heard.  Pulmonary:      Effort: Pulmonary effort is normal.      Breath sounds: Normal breath sounds.   Musculoskeletal:      Cervical back: Neck supple. No rigidity.      Comments:     Skin:     Coloration: Skin is not pale.   Neurological:      General: No focal deficit present.      Mental Status: She is " alert and oriented to person, place, and time.   Psychiatric:         Mood and Affect: Mood normal.         Behavior: Behavior normal.         Assessment/Plan   Problem List Items Addressed This Visit       Type 2 diabetes mellitus with hyperglycemia (CMS/[facility])     Other Visit Diagnoses       Acute cough    -  Primary    Relevant Medications    budesonide-formoteroL (Symbicort) 80-4.5 mcg/actuation inhaler                   Lisandra Denney MD

## 2023-10-23 ENCOUNTER — LAB (OUTPATIENT)
Dept: LAB | Facility: LAB | Age: 48
End: 2023-10-23
Payer: COMMERCIAL

## 2023-10-23 DIAGNOSIS — N92.0 MENORRHAGIA WITH REGULAR CYCLE: ICD-10-CM

## 2023-10-23 LAB
ERYTHROCYTE [DISTWIDTH] IN BLOOD BY AUTOMATED COUNT: 16.9 % (ref 11.5–14.5)
HCG UR QL IA.RAPID: NEGATIVE
HCT VFR BLD AUTO: 38.4 % (ref 36–46)
HGB BLD-MCNC: 11.2 G/DL (ref 12–16)
MCH RBC QN AUTO: 22.4 PG (ref 26–34)
MCHC RBC AUTO-ENTMCNC: 29.2 G/DL (ref 32–36)
MCV RBC AUTO: 77 FL (ref 80–100)
NRBC BLD-RTO: 0 /100 WBCS (ref 0–0)
PLATELET # BLD AUTO: 335 X10*3/UL (ref 150–450)
PMV BLD AUTO: 9.7 FL (ref 7.5–11.5)
RBC # BLD AUTO: 4.99 X10*6/UL (ref 4–5.2)
WBC # BLD AUTO: 9.9 X10*3/UL (ref 4.4–11.3)

## 2023-10-23 PROCEDURE — 85027 COMPLETE CBC AUTOMATED: CPT

## 2023-10-23 PROCEDURE — 36415 COLL VENOUS BLD VENIPUNCTURE: CPT

## 2023-10-23 PROCEDURE — 81025 URINE PREGNANCY TEST: CPT

## 2023-10-25 ENCOUNTER — ANESTHESIA EVENT (OUTPATIENT)
Dept: OPERATING ROOM | Facility: HOSPITAL | Age: 48
End: 2023-10-25
Payer: COMMERCIAL

## 2023-10-25 RX ORDER — LANOLIN ALCOHOL/MO/W.PET/CERES
500 CREAM (GRAM) TOPICAL DAILY
COMMUNITY
End: 2024-03-22 | Stop reason: WASHOUT

## 2023-10-25 RX ORDER — LANOLIN ALCOHOL/MO/W.PET/CERES
100 CREAM (GRAM) TOPICAL DAILY
COMMUNITY

## 2023-10-25 NOTE — PREPROCEDURE INSTRUCTIONS
No outpatient medications have been marked as taking for the 10/26/23 encounter (Hospital Encounter).                       NPO Instructions:    Do not eat any food after midnight the night before your surgery/procedure.    Additional Instructions:     Will need . Arrive on 2nd floor at hospital at 0600 thursday

## 2023-10-26 ENCOUNTER — ANESTHESIA (OUTPATIENT)
Dept: OPERATING ROOM | Facility: HOSPITAL | Age: 48
End: 2023-10-26
Payer: COMMERCIAL

## 2023-10-26 ENCOUNTER — HOSPITAL ENCOUNTER (OUTPATIENT)
Facility: HOSPITAL | Age: 48
Setting detail: OUTPATIENT SURGERY
Discharge: HOME | End: 2023-10-26
Attending: OBSTETRICS & GYNECOLOGY | Admitting: OBSTETRICS & GYNECOLOGY
Payer: COMMERCIAL

## 2023-10-26 VITALS
WEIGHT: 188.93 LBS | RESPIRATION RATE: 15 BRPM | DIASTOLIC BLOOD PRESSURE: 74 MMHG | HEART RATE: 81 BPM | HEIGHT: 65 IN | SYSTOLIC BLOOD PRESSURE: 126 MMHG | TEMPERATURE: 97.6 F | OXYGEN SATURATION: 96 % | BODY MASS INDEX: 31.48 KG/M2

## 2023-10-26 DIAGNOSIS — N92.0 MENORRHAGIA WITH REGULAR CYCLE: Primary | ICD-10-CM

## 2023-10-26 PROCEDURE — 2500000004 HC RX 250 GENERAL PHARMACY W/ HCPCS (ALT 636 FOR OP/ED): Performed by: ANESTHESIOLOGY

## 2023-10-26 PROCEDURE — 58555 HYSTEROSCOPY DX SEP PROC: CPT | Performed by: OBSTETRICS & GYNECOLOGY

## 2023-10-26 PROCEDURE — 2500000001 HC RX 250 WO HCPCS SELF ADMINISTERED DRUGS (ALT 637 FOR MEDICARE OP): Performed by: OBSTETRICS & GYNECOLOGY

## 2023-10-26 PROCEDURE — 3700000001 HC GENERAL ANESTHESIA TIME - INITIAL BASE CHARGE: Performed by: OBSTETRICS & GYNECOLOGY

## 2023-10-26 PROCEDURE — 3700000002 HC GENERAL ANESTHESIA TIME - EACH INCREMENTAL 1 MINUTE: Performed by: OBSTETRICS & GYNECOLOGY

## 2023-10-26 PROCEDURE — 7100000009 HC PHASE TWO TIME - INITIAL BASE CHARGE: Performed by: OBSTETRICS & GYNECOLOGY

## 2023-10-26 PROCEDURE — 2720000007 HC OR 272 NO HCPCS: Performed by: OBSTETRICS & GYNECOLOGY

## 2023-10-26 PROCEDURE — 7100000001 HC RECOVERY ROOM TIME - INITIAL BASE CHARGE: Performed by: OBSTETRICS & GYNECOLOGY

## 2023-10-26 PROCEDURE — 3600000003 HC OR TIME - INITIAL BASE CHARGE - PROCEDURE LEVEL THREE: Performed by: OBSTETRICS & GYNECOLOGY

## 2023-10-26 PROCEDURE — 7100000010 HC PHASE TWO TIME - EACH INCREMENTAL 1 MINUTE: Performed by: OBSTETRICS & GYNECOLOGY

## 2023-10-26 PROCEDURE — 2500000005 HC RX 250 GENERAL PHARMACY W/O HCPCS: Performed by: ANESTHESIOLOGY

## 2023-10-26 PROCEDURE — 3600000008 HC OR TIME - EACH INCREMENTAL 1 MINUTE - PROCEDURE LEVEL THREE: Performed by: OBSTETRICS & GYNECOLOGY

## 2023-10-26 PROCEDURE — 7100000002 HC RECOVERY ROOM TIME - EACH INCREMENTAL 1 MINUTE: Performed by: OBSTETRICS & GYNECOLOGY

## 2023-10-26 RX ORDER — LIDOCAINE HYDROCHLORIDE 10 MG/ML
0.1 INJECTION, SOLUTION EPIDURAL; INFILTRATION; INTRACAUDAL; PERINEURAL ONCE
Status: DISCONTINUED | OUTPATIENT
Start: 2023-10-26 | End: 2023-10-26 | Stop reason: HOSPADM

## 2023-10-26 RX ORDER — FENTANYL CITRATE 50 UG/ML
INJECTION, SOLUTION INTRAMUSCULAR; INTRAVENOUS AS NEEDED
Status: DISCONTINUED | OUTPATIENT
Start: 2023-10-26 | End: 2023-10-26

## 2023-10-26 RX ORDER — ACETAMINOPHEN 325 MG/1
975 TABLET ORAL ONCE
Status: DISCONTINUED | OUTPATIENT
Start: 2023-10-26 | End: 2023-10-26 | Stop reason: HOSPADM

## 2023-10-26 RX ORDER — SODIUM CHLORIDE, SODIUM LACTATE, POTASSIUM CHLORIDE, CALCIUM CHLORIDE 600; 310; 30; 20 MG/100ML; MG/100ML; MG/100ML; MG/100ML
50 INJECTION, SOLUTION INTRAVENOUS CONTINUOUS
Status: DISCONTINUED | OUTPATIENT
Start: 2023-10-26 | End: 2023-10-26 | Stop reason: HOSPADM

## 2023-10-26 RX ORDER — ACETAMINOPHEN AND CODEINE PHOSPHATE 300; 30 MG/1; MG/1
1 TABLET ORAL EVERY 6 HOURS PRN
Qty: 10 TABLET | Refills: 0 | Status: SHIPPED | OUTPATIENT
Start: 2023-10-26 | End: 2023-10-26 | Stop reason: HOSPADM

## 2023-10-26 RX ORDER — MORPHINE SULFATE 4 MG/ML
4 INJECTION, SOLUTION INTRAMUSCULAR; INTRAVENOUS EVERY 5 MIN PRN
Status: DISCONTINUED | OUTPATIENT
Start: 2023-10-26 | End: 2023-10-26 | Stop reason: HOSPADM

## 2023-10-26 RX ORDER — PROMETHAZINE HYDROCHLORIDE 25 MG/ML
INJECTION, SOLUTION INTRAMUSCULAR; INTRAVENOUS AS NEEDED
Status: DISCONTINUED | OUTPATIENT
Start: 2023-10-26 | End: 2023-10-26

## 2023-10-26 RX ORDER — PROPOFOL 10 MG/ML
INJECTION, EMULSION INTRAVENOUS AS NEEDED
Status: DISCONTINUED | OUTPATIENT
Start: 2023-10-26 | End: 2023-10-26

## 2023-10-26 RX ORDER — ACETAMINOPHEN 325 MG/1
975 TABLET ORAL ONCE
Status: COMPLETED | OUTPATIENT
Start: 2023-10-26 | End: 2023-10-26

## 2023-10-26 RX ORDER — OXYCODONE HYDROCHLORIDE 5 MG/1
5 TABLET ORAL EVERY 4 HOURS PRN
Status: DISCONTINUED | OUTPATIENT
Start: 2023-10-26 | End: 2023-10-26 | Stop reason: HOSPADM

## 2023-10-26 RX ORDER — SODIUM CHLORIDE, SODIUM LACTATE, POTASSIUM CHLORIDE, CALCIUM CHLORIDE 600; 310; 30; 20 MG/100ML; MG/100ML; MG/100ML; MG/100ML
100 INJECTION, SOLUTION INTRAVENOUS CONTINUOUS
Status: DISCONTINUED | OUTPATIENT
Start: 2023-10-26 | End: 2023-10-26 | Stop reason: HOSPADM

## 2023-10-26 RX ORDER — CELECOXIB 200 MG/1
400 CAPSULE ORAL ONCE
Status: COMPLETED | OUTPATIENT
Start: 2023-10-26 | End: 2023-10-26

## 2023-10-26 RX ORDER — ONDANSETRON HYDROCHLORIDE 2 MG/ML
4 INJECTION, SOLUTION INTRAVENOUS ONCE
Status: COMPLETED | OUTPATIENT
Start: 2023-10-26 | End: 2023-10-26

## 2023-10-26 RX ORDER — ONDANSETRON HYDROCHLORIDE 2 MG/ML
4 INJECTION, SOLUTION INTRAVENOUS ONCE AS NEEDED
Status: DISCONTINUED | OUTPATIENT
Start: 2023-10-26 | End: 2023-10-26 | Stop reason: HOSPADM

## 2023-10-26 RX ORDER — GABAPENTIN 300 MG/1
600 CAPSULE ORAL ONCE
Status: COMPLETED | OUTPATIENT
Start: 2023-10-26 | End: 2023-10-26

## 2023-10-26 RX ORDER — LABETALOL HYDROCHLORIDE 5 MG/ML
5 INJECTION, SOLUTION INTRAVENOUS ONCE AS NEEDED
Status: DISCONTINUED | OUTPATIENT
Start: 2023-10-26 | End: 2023-10-26 | Stop reason: HOSPADM

## 2023-10-26 RX ORDER — MORPHINE SULFATE 4 MG/ML
2 INJECTION, SOLUTION INTRAMUSCULAR; INTRAVENOUS EVERY 5 MIN PRN
Status: DISCONTINUED | OUTPATIENT
Start: 2023-10-26 | End: 2023-10-26 | Stop reason: HOSPADM

## 2023-10-26 RX ADMIN — ONDANSETRON 4 MG: 2 INJECTION INTRAMUSCULAR; INTRAVENOUS at 07:05

## 2023-10-26 RX ADMIN — SODIUM CHLORIDE, POTASSIUM CHLORIDE, SODIUM LACTATE AND CALCIUM CHLORIDE 50 ML/HR: 600; 310; 30; 20 INJECTION, SOLUTION INTRAVENOUS at 06:49

## 2023-10-26 RX ADMIN — PROMETHAZINE HYDROCHLORIDE 6.25 MG: 25 INJECTION INTRAMUSCULAR; INTRAVENOUS at 07:38

## 2023-10-26 RX ADMIN — FENTANYL CITRATE 100 MCG: 50 INJECTION, SOLUTION INTRAMUSCULAR; INTRAVENOUS at 07:35

## 2023-10-26 RX ADMIN — Medication 20 MG: at 07:35

## 2023-10-26 RX ADMIN — PROPOFOL 100 MG: 10 INJECTION, EMULSION INTRAVENOUS at 07:35

## 2023-10-26 RX ADMIN — ACETAMINOPHEN 975 MG: 325 TABLET ORAL at 06:55

## 2023-10-26 RX ADMIN — CELECOXIB 400 MG: 200 CAPSULE ORAL at 07:16

## 2023-10-26 RX ADMIN — GABAPENTIN 600 MG: 300 CAPSULE ORAL at 07:18

## 2023-10-26 ASSESSMENT — PAIN - FUNCTIONAL ASSESSMENT
PAIN_FUNCTIONAL_ASSESSMENT: 0-10

## 2023-10-26 ASSESSMENT — PAIN SCALES - GENERAL
PAINLEVEL_OUTOF10: 0 - NO PAIN

## 2023-10-26 ASSESSMENT — COLUMBIA-SUICIDE SEVERITY RATING SCALE - C-SSRS
6. HAVE YOU EVER DONE ANYTHING, STARTED TO DO ANYTHING, OR PREPARED TO DO ANYTHING TO END YOUR LIFE?: NO
1. IN THE PAST MONTH, HAVE YOU WISHED YOU WERE DEAD OR WISHED YOU COULD GO TO SLEEP AND NOT WAKE UP?: NO
2. HAVE YOU ACTUALLY HAD ANY THOUGHTS OF KILLING YOURSELF?: NO

## 2023-10-26 NOTE — ANESTHESIA PREPROCEDURE EVALUATION
Patient: Nubia Martinez    Procedure Information       Date/Time: 10/26/23 0730    Procedure: HYSTEROSCOPY D&C ENDOMETRIAL ABLATION    Location: [facility] 03 / [facility] OR    Surgeons: Alf Guillen MD            Relevant Problems   Cardiovascular   (+) Hyperlipidemia      Endocrine   (+) Type 2 diabetes mellitus with hyperglycemia (CMS/HCC)      GI   (+) Hiatal hernia with GERD       Clinical information reviewed:   Tobacco  Allergies  Meds   Med Hx  Surg Hx  OB Status  Fam Hx  Soc   Hx        NPO/Void Status  Date of Last Liquid: 10/25/23  Time of Last Liquid: 2200  Date of Last Solid: 10/25/23  Time of Last Solid: 2200  Time of Last Void: 0530         Physical Exam    Airway  Mallampati: II  TM distance: >3 FB     Cardiovascular - normal exam     Dental    Pulmonary    Abdominal        Anesthesia Plan    ASA 2     general     intravenous induction   Anesthetic plan and risks discussed with patient.     Anesthesia Evaluation     Patient summary reviewed    Airway   Mallampati: II  TM distance: >3 FB  Dental      Pulmonary - negative ROS   Cardiovascular - negative ROS and normal exam    Neuro/Psych - negative ROS     GI/Hepatic/Renal    (+) hiatal hernia, GERD    Endo/Other    (+) diabetes mellitus type 2  Abdominal

## 2023-10-26 NOTE — OP NOTE
Hysteroscopy Operative Note     Date: 10/26/2023  OR Location: [facility] OR    Name: Nubia Martinez, : 1975, Age: 48 y.o., MRN: [id number], Sex: female    Diagnosis  Pre-op Diagnosis     * Menorrhagia with regular cycle [N92.0] Post-op Diagnosis     * Menorrhagia with regular cycle [N92.0]     Procedures  Hysteroscopy  34221 - ME HYSTEROSCOPY DIAGNOSTIC SEPARATE PROCEDURE      Surgeons      * Alf Guillen - Primary    Resident/Fellow/Other Assistant:  Surgeon(s) and Role:    Procedure Summary  Anesthesia: General  ASA: II  Anesthesia Staff: Anesthesiologist: Zen Bloom MD  Estimated Blood Loss: 1ml  Intra-op Medications: * No intraprocedure medications in log *           Anesthesia Record               Intraprocedure I/O Totals          Intake    Ketamine 0.00 mL    The total shown is the total volume documented since Anesthesia Start was filed.    Total Intake 0 mL          Specimen: No specimens collected     Staff:   Circulator: Andra Guevara RN  Scrub Person: Melanie Medina RN         Drains and/or Catheters: * None in log *    Tourniquet Times:         Implants:     Findings: Noted stenotic internal cervical os.  The appearance of the cervix has a cribriform appearance.  Unable to enter the uterine cavity secondary to cervical stenosis.    Indications: Nubia Martinez is an 48 y.o. female who is having surgery for Menorrhagia with regular cycle [N92.0].     The patient was seen in the preoperative area. The risks, benefits, complications, treatment options, non-operative alternatives, expected recovery and outcomes were discussed with the patient. The possibilities of reaction to medication, pulmonary aspiration, injury to surrounding structures, bleeding, recurrent infection, the need for additional procedures, failure to diagnose a condition, and creating a complication requiring transfusion or operation were discussed with the patient. The patient concurred with the proposed plan, giving informed  consent.  The site of surgery was properly noted/marked if necessary per policy. The patient has been actively warmed in preoperative area. Preoperative antibiotics are not indicated. Venous thrombosis prophylaxis are not indicated.    Procedure Details: Operative findings: Upper limits of normal sized anteverted uterus.  Noted cervical stenosis and the appearance in the endocervical canal has a cribriform appearance.  Unable to enter the uterine cavity secondary to cervical stenosis.    Procedure:   The patient was brought in to the operating room, and placed in the supine position. Sequential compression devices placed on the lower extremities. After an adequate level of general anesthesia, she was then placed in the low lithotomy position.  The perineum and vagina were prepped and draped in the usual sterile fashion.  Bimanual exam was performed, and above findings noted.  The urinary bladder was drained using a straight red Camargo catheter.  A weighted speculum was placed in the vagina.  The anterior vaginal wall was elevated.   The anterior lip of the cervix was grasped with an Allis clamp.  Unable to sound the uterus secondary to cervical stenosis..    The hysteroscope was then introduced to the cervix in order to try to visualize the internal cervical os.  The endocervical canal had a cribriform appearance and the internal cervical os could not be identified.  Therefore, unable to perform endometrial ablation secondary to cervical stenosis.  Uterine sound would only go to about 3 cm into the endocervical canal.  Therefore, the procedure was ended.    The hysteroscope, Allis clamp, and weighted speculum were all removed. Sponge and needle counts were correct x 2.  The patient tolerated the procedure well and was taken to PACU in good condition. Patient to follow-up in office in 2 weeks.      Complications:  None; patient tolerated the procedure well.    Disposition: PACU - hemodynamically stable.  Condition:  stable         Additional Details: none    Attending Attestation: I performed the procedure.    Alf Guillen  Phone Number: [phone]

## 2023-10-26 NOTE — ANESTHESIA PROCEDURE NOTES
Airway  Date/Time: 10/26/2023 7:36 AM  Urgency: elective      Staffing  Performed: attending   Authorized by: Zen Bloom MD    Performed by: Zen Bloom MD  Patient location during procedure: OR    Final Airway Details  Final airway type: supraglottic airway      Successful airway: ProSeal  Size 4

## 2023-10-26 NOTE — ANESTHESIA POSTPROCEDURE EVALUATION
Patient: Nubia Martinez    Procedure Summary       Date: 10/26/23 Room / Location: [facility] OR 03 / [facility] OR    Anesthesia Start: 0732 Anesthesia Stop: 0813    Procedure: HYSTEROSCOPY D&C ENDOMETRIAL ABLATION Diagnosis:       Menorrhagia with regular cycle      (Menorrhagia with regular cycle [N92.0])    Surgeons: Alf Guillen MD Responsible Provider: Zen Bloom MD    Anesthesia Type: general ASA Status: 2            Anesthesia Type: general    Vitals Value Taken Time   Vitals:    10/26/23 0629   BP: 144/90   Pulse: 93   Resp: 20   Temp: 36.8 °C (98.2 °F)   SpO2: 97%       10/26/23 0813     10/26/23 0813     10/26/23 0813     10/26/23 0813     10/26/23 0813       Anesthesia Post Evaluation    Patient location during evaluation: bedside  Patient participation: complete - patient participated  Level of consciousness: sleepy but conscious  Pain management: adequate  Airway patency: patent  Cardiovascular status: acceptable  Respiratory status: acceptable      No notable events documented.

## 2023-10-27 ENCOUNTER — TELEPHONE (OUTPATIENT)
Dept: OBSTETRICS AND GYNECOLOGY | Facility: CLINIC | Age: 48
End: 2023-10-27
Payer: COMMERCIAL

## 2023-10-27 DIAGNOSIS — N92.0 MENORRHAGIA WITH REGULAR CYCLE: Primary | ICD-10-CM

## 2023-10-27 RX ORDER — DROSPIRENONE AND ETHINYL ESTRADIOL 0.03MG-3MG
1 KIT ORAL DAILY
Qty: 84 TABLET | Refills: 3 | Status: SHIPPED | OUTPATIENT
Start: 2023-10-27 | End: 2023-12-20 | Stop reason: SDUPTHER

## 2023-11-05 ENCOUNTER — APPOINTMENT (OUTPATIENT)
Dept: RADIOLOGY | Facility: HOSPITAL | Age: 48
End: 2023-11-05
Payer: COMMERCIAL

## 2023-11-05 ENCOUNTER — HOSPITAL ENCOUNTER (EMERGENCY)
Facility: HOSPITAL | Age: 48
Discharge: HOME | End: 2023-11-05
Attending: EMERGENCY MEDICINE
Payer: COMMERCIAL

## 2023-11-05 VITALS
BODY MASS INDEX: 30.82 KG/M2 | DIASTOLIC BLOOD PRESSURE: 84 MMHG | TEMPERATURE: 98.6 F | HEART RATE: 84 BPM | WEIGHT: 185 LBS | SYSTOLIC BLOOD PRESSURE: 136 MMHG | OXYGEN SATURATION: 97 % | RESPIRATION RATE: 17 BRPM | HEIGHT: 65 IN

## 2023-11-05 DIAGNOSIS — R07.89 CHEST WALL PAIN: Primary | ICD-10-CM

## 2023-11-05 DIAGNOSIS — R09.1 PLEURISY: ICD-10-CM

## 2023-11-05 LAB
ALBUMIN SERPL BCP-MCNC: 3.8 G/DL (ref 3.4–5)
ALP SERPL-CCNC: 44 U/L (ref 33–110)
ALT SERPL W P-5'-P-CCNC: 11 U/L (ref 7–45)
ANION GAP SERPL CALC-SCNC: 16 MMOL/L (ref 10–20)
APTT PPP: 27 SECONDS (ref 27–38)
AST SERPL W P-5'-P-CCNC: 13 U/L (ref 9–39)
BASOPHILS # BLD AUTO: 0.06 X10*3/UL (ref 0–0.1)
BASOPHILS NFR BLD AUTO: 0.6 %
BILIRUB DIRECT SERPL-MCNC: 0.1 MG/DL (ref 0–0.3)
BILIRUB SERPL-MCNC: 0.4 MG/DL (ref 0–1.2)
BNP SERPL-MCNC: 30 PG/ML (ref 0–99)
BUN SERPL-MCNC: 14 MG/DL (ref 6–23)
CALCIUM SERPL-MCNC: 8.7 MG/DL (ref 8.6–10.3)
CARDIAC TROPONIN I PNL SERPL HS: 3 NG/L (ref 0–13)
CHLORIDE SERPL-SCNC: 105 MMOL/L (ref 98–107)
CO2 SERPL-SCNC: 19 MMOL/L (ref 21–32)
CREAT SERPL-MCNC: 0.95 MG/DL (ref 0.5–1.05)
D DIMER PPP FEU-MCNC: 696 NG/ML FEU
EOSINOPHIL # BLD AUTO: 0.23 X10*3/UL (ref 0–0.7)
EOSINOPHIL NFR BLD AUTO: 2.3 %
ERYTHROCYTE [DISTWIDTH] IN BLOOD BY AUTOMATED COUNT: 17.3 % (ref 11.5–14.5)
GFR SERPL CREATININE-BSD FRML MDRD: 73 ML/MIN/1.73M*2
GLUCOSE SERPL-MCNC: 104 MG/DL (ref 74–99)
HCT VFR BLD AUTO: 37.7 % (ref 36–46)
HGB BLD-MCNC: 11.5 G/DL (ref 12–16)
IMM GRANULOCYTES # BLD AUTO: 0.04 X10*3/UL (ref 0–0.7)
IMM GRANULOCYTES NFR BLD AUTO: 0.4 % (ref 0–0.9)
INR PPP: 1 (ref 0.9–1.1)
LIPASE SERPL-CCNC: 33 U/L (ref 9–82)
LYMPHOCYTES # BLD AUTO: 2.7 X10*3/UL (ref 1.2–4.8)
LYMPHOCYTES NFR BLD AUTO: 26.6 %
MCH RBC QN AUTO: 23 PG (ref 26–34)
MCHC RBC AUTO-ENTMCNC: 30.5 G/DL (ref 32–36)
MCV RBC AUTO: 75 FL (ref 80–100)
MONOCYTES # BLD AUTO: 0.55 X10*3/UL (ref 0.1–1)
MONOCYTES NFR BLD AUTO: 5.4 %
NEUTROPHILS # BLD AUTO: 6.57 X10*3/UL (ref 1.2–7.7)
NEUTROPHILS NFR BLD AUTO: 64.7 %
NRBC BLD-RTO: 0 /100 WBCS (ref 0–0)
PLATELET # BLD AUTO: 306 X10*3/UL (ref 150–450)
POTASSIUM SERPL-SCNC: 4.2 MMOL/L (ref 3.5–5.3)
PROT SERPL-MCNC: 6.6 G/DL (ref 6.4–8.2)
PROTHROMBIN TIME: 11.1 SECONDS (ref 9.8–12.8)
RBC # BLD AUTO: 5 X10*6/UL (ref 4–5.2)
SARS-COV-2 RNA RESP QL NAA+PROBE: NOT DETECTED
SODIUM SERPL-SCNC: 136 MMOL/L (ref 136–145)
WBC # BLD AUTO: 10.2 X10*3/UL (ref 4.4–11.3)

## 2023-11-05 PROCEDURE — 2500000004 HC RX 250 GENERAL PHARMACY W/ HCPCS (ALT 636 FOR OP/ED): Performed by: EMERGENCY MEDICINE

## 2023-11-05 PROCEDURE — 85025 COMPLETE CBC W/AUTO DIFF WBC: CPT | Performed by: EMERGENCY MEDICINE

## 2023-11-05 PROCEDURE — 71045 X-RAY EXAM CHEST 1 VIEW: CPT | Performed by: RADIOLOGY

## 2023-11-05 PROCEDURE — 99284 EMERGENCY DEPT VISIT MOD MDM: CPT | Mod: 25 | Performed by: EMERGENCY MEDICINE

## 2023-11-05 PROCEDURE — 2500000001 HC RX 250 WO HCPCS SELF ADMINISTERED DRUGS (ALT 637 FOR MEDICARE OP): Performed by: EMERGENCY MEDICINE

## 2023-11-05 PROCEDURE — 96361 HYDRATE IV INFUSION ADD-ON: CPT | Mod: 59 | Performed by: EMERGENCY MEDICINE

## 2023-11-05 PROCEDURE — 82248 BILIRUBIN DIRECT: CPT | Performed by: EMERGENCY MEDICINE

## 2023-11-05 PROCEDURE — 96374 THER/PROPH/DIAG INJ IV PUSH: CPT | Mod: 59 | Performed by: EMERGENCY MEDICINE

## 2023-11-05 PROCEDURE — 71045 X-RAY EXAM CHEST 1 VIEW: CPT

## 2023-11-05 PROCEDURE — 99285 EMERGENCY DEPT VISIT HI MDM: CPT | Mod: 25 | Performed by: EMERGENCY MEDICINE

## 2023-11-05 PROCEDURE — 85730 THROMBOPLASTIN TIME PARTIAL: CPT | Performed by: EMERGENCY MEDICINE

## 2023-11-05 PROCEDURE — 85610 PROTHROMBIN TIME: CPT | Performed by: EMERGENCY MEDICINE

## 2023-11-05 PROCEDURE — 93005 ELECTROCARDIOGRAM TRACING: CPT

## 2023-11-05 PROCEDURE — 36415 COLL VENOUS BLD VENIPUNCTURE: CPT | Performed by: EMERGENCY MEDICINE

## 2023-11-05 PROCEDURE — 87635 SARS-COV-2 COVID-19 AMP PRB: CPT | Performed by: EMERGENCY MEDICINE

## 2023-11-05 PROCEDURE — 2550000001 HC RX 255 CONTRASTS: Performed by: EMERGENCY MEDICINE

## 2023-11-05 PROCEDURE — 71275 CT ANGIOGRAPHY CHEST: CPT | Performed by: STUDENT IN AN ORGANIZED HEALTH CARE EDUCATION/TRAINING PROGRAM

## 2023-11-05 PROCEDURE — 85379 FIBRIN DEGRADATION QUANT: CPT | Performed by: EMERGENCY MEDICINE

## 2023-11-05 PROCEDURE — 83690 ASSAY OF LIPASE: CPT | Performed by: EMERGENCY MEDICINE

## 2023-11-05 PROCEDURE — 71275 CT ANGIOGRAPHY CHEST: CPT

## 2023-11-05 PROCEDURE — 96375 TX/PRO/DX INJ NEW DRUG ADDON: CPT | Mod: 59 | Performed by: EMERGENCY MEDICINE

## 2023-11-05 PROCEDURE — 83880 ASSAY OF NATRIURETIC PEPTIDE: CPT | Performed by: EMERGENCY MEDICINE

## 2023-11-05 PROCEDURE — 84484 ASSAY OF TROPONIN QUANT: CPT | Performed by: EMERGENCY MEDICINE

## 2023-11-05 RX ORDER — TRAMADOL HYDROCHLORIDE 50 MG/1
50 TABLET ORAL 4 TIMES DAILY
Qty: 12 TABLET | Refills: 0 | Status: SHIPPED | OUTPATIENT
Start: 2023-11-05 | End: 2023-11-08

## 2023-11-05 RX ORDER — MORPHINE SULFATE 4 MG/ML
4 INJECTION, SOLUTION INTRAMUSCULAR; INTRAVENOUS ONCE
Status: COMPLETED | OUTPATIENT
Start: 2023-11-05 | End: 2023-11-05

## 2023-11-05 RX ORDER — SODIUM CHLORIDE 9 MG/ML
125 INJECTION, SOLUTION INTRAVENOUS CONTINUOUS
Status: DISCONTINUED | OUTPATIENT
Start: 2023-11-05 | End: 2023-11-05 | Stop reason: HOSPADM

## 2023-11-05 RX ORDER — ACETAMINOPHEN 160 MG/5ML
650 SOLUTION ORAL ONCE
Status: COMPLETED | OUTPATIENT
Start: 2023-11-05 | End: 2023-11-05

## 2023-11-05 RX ORDER — KETOROLAC TROMETHAMINE 30 MG/ML
30 INJECTION, SOLUTION INTRAMUSCULAR; INTRAVENOUS ONCE
Status: COMPLETED | OUTPATIENT
Start: 2023-11-05 | End: 2023-11-05

## 2023-11-05 RX ORDER — IBUPROFEN 600 MG/1
600 TABLET ORAL 3 TIMES DAILY
Qty: 30 TABLET | Refills: 0 | Status: SHIPPED | OUTPATIENT
Start: 2023-11-05 | End: 2023-12-21 | Stop reason: ALTCHOICE

## 2023-11-05 RX ADMIN — MORPHINE SULFATE 4 MG: 4 INJECTION, SOLUTION INTRAMUSCULAR; INTRAVENOUS at 18:27

## 2023-11-05 RX ADMIN — KETOROLAC TROMETHAMINE 30 MG: 30 INJECTION, SOLUTION INTRAMUSCULAR; INTRAVENOUS at 18:26

## 2023-11-05 RX ADMIN — IOHEXOL 60 ML: 350 INJECTION, SOLUTION INTRAVENOUS at 16:46

## 2023-11-05 RX ADMIN — SODIUM CHLORIDE 125 ML/HR: 9 INJECTION, SOLUTION INTRAVENOUS at 15:06

## 2023-11-05 RX ADMIN — ACETAMINOPHEN 650 MG: 160 SOLUTION ORAL at 15:07

## 2023-11-05 ASSESSMENT — ENCOUNTER SYMPTOMS
FEVER: 0
NECK STIFFNESS: 0
CHILLS: 0
DIZZINESS: 0
VOMITING: 0
COUGH: 0
NECK PAIN: 0
DYSURIA: 0
PALPITATIONS: 0
NAUSEA: 0
SORE THROAT: 0
PSYCHIATRIC NEGATIVE: 1
MYALGIAS: 1
SHORTNESS OF BREATH: 0
HEADACHES: 0
ARTHRALGIAS: 0
CHEST TIGHTNESS: 0
ABDOMINAL PAIN: 0

## 2023-11-05 ASSESSMENT — PAIN DESCRIPTION - PAIN TYPE
TYPE: ACUTE PAIN
TYPE: ACUTE PAIN

## 2023-11-05 ASSESSMENT — PAIN DESCRIPTION - LOCATION
LOCATION: CHEST
LOCATION: CHEST

## 2023-11-05 ASSESSMENT — PAIN DESCRIPTION - FREQUENCY: FREQUENCY: CONSTANT/CONTINUOUS

## 2023-11-05 ASSESSMENT — HEART SCORE
ECG: NORMAL
HISTORY: SLIGHTLY SUSPICIOUS
RISK FACTORS: 1-2 RISK FACTORS
AGE: 45-64
HEART SCORE: 2
TROPONIN: LESS THAN OR EQUAL TO NORMAL LIMIT

## 2023-11-05 ASSESSMENT — PAIN DESCRIPTION - DIRECTION: RADIATING_TOWARDS: BACK

## 2023-11-05 ASSESSMENT — COLUMBIA-SUICIDE SEVERITY RATING SCALE - C-SSRS
1. IN THE PAST MONTH, HAVE YOU WISHED YOU WERE DEAD OR WISHED YOU COULD GO TO SLEEP AND NOT WAKE UP?: NO
6. HAVE YOU EVER DONE ANYTHING, STARTED TO DO ANYTHING, OR PREPARED TO DO ANYTHING TO END YOUR LIFE?: NO
2. HAVE YOU ACTUALLY HAD ANY THOUGHTS OF KILLING YOURSELF?: NO

## 2023-11-05 ASSESSMENT — PAIN DESCRIPTION - DESCRIPTORS: DESCRIPTORS: STABBING

## 2023-11-05 ASSESSMENT — PAIN - FUNCTIONAL ASSESSMENT
PAIN_FUNCTIONAL_ASSESSMENT: 0-10
PAIN_FUNCTIONAL_ASSESSMENT: 0-10

## 2023-11-05 ASSESSMENT — PAIN SCALES - GENERAL
PAINLEVEL_OUTOF10: 4
PAINLEVEL_OUTOF10: 6
PAINLEVEL_OUTOF10: 1

## 2023-11-05 ASSESSMENT — PAIN DESCRIPTION - ONSET: ONSET: SUDDEN

## 2023-11-05 NOTE — DISCHARGE INSTRUCTIONS
ALTERNATE HEAT AND ICE FOR PAIN  MOTRIN FOR INFLAMMATION ( DO NOT TAKE WITH ANY OTHER ANTI-INFLAMMATORY MEDS)  ULTRAM FOR PAIN

## 2023-11-05 NOTE — ED PROVIDER NOTES
Chief Complaint: CHEST PAIN    This is a 48-year-old female complains of some right sided sharp chest pain that started late this morning.  She denies any injuries although yesterday she was outside working on pulling some type of a bush or tree out.  She denies any shortness of breath but complains of pain with deep inspiration only she denies any palpitations or irregular heartbeats she denies any dizziness although she felt somewhat out of it today.  She denies any fever or chills.  She does have a history of anemia and is on estrogens and progesterone to control her heavy periods as this was thought to be the etiology of her anemia.  She has no history of cardiac disease does not smoke denies any fever chills no cough or congestion no nausea vomiting no diaphoresis otherwise           Review of Systems   Constitutional:  Negative for chills and fever.   HENT:  Negative for congestion and sore throat.    Respiratory:  Negative for cough, chest tightness and shortness of breath.    Cardiovascular:  Positive for chest pain. Negative for palpitations and leg swelling.        Sharp right-sided pleuritic chest pain   Gastrointestinal:  Negative for abdominal pain, nausea and vomiting.   Genitourinary:  Negative for dysuria and urgency.   Musculoskeletal:  Positive for myalgias. Negative for arthralgias, neck pain and neck stiffness.   Skin:  Negative for rash.   Neurological:  Negative for dizziness and headaches.   Psychiatric/Behavioral: Negative.     All other systems reviewed and are negative.       Physical Exam  Constitutional:       General: She is not in acute distress.     Appearance: She is well-developed. She is not ill-appearing.   HENT:      Head: Normocephalic and atraumatic.   Eyes:      Extraocular Movements: Extraocular movements intact.      Pupils: Pupils are equal, round, and reactive to light.   Cardiovascular:      Rate and Rhythm: Normal rate and regular rhythm.      Heart sounds: Normal heart  sounds.   Pulmonary:      Effort: Pulmonary effort is normal.      Breath sounds: Normal breath sounds.   Chest:      Chest wall: Tenderness present. No crepitus.       Musculoskeletal:         General: No swelling or tenderness. Normal range of motion.      Cervical back: Normal range of motion and neck supple.      Comments: Negative Homans' sign there is no calf tenderness or pain   Skin:     General: Skin is warm and dry.      Findings: No erythema or rash.   Neurological:      General: No focal deficit present.      Mental Status: She is alert and oriented to person, place, and time.      Sensory: No sensory deficit.      Motor: No weakness.   Psychiatric:         Mood and Affect: Mood normal.         Behavior: Behavior normal.          Labs Reviewed   CBC WITH AUTO DIFFERENTIAL - Abnormal       Result Value    WBC 10.2      nRBC 0.0      RBC 5.00      Hemoglobin 11.5 (*)     Hematocrit 37.7      MCV 75 (*)     MCH 23.0 (*)     MCHC 30.5 (*)     RDW 17.3 (*)     Platelets 306      Neutrophils % 64.7      Immature Granulocytes %, Automated 0.4      Lymphocytes % 26.6      Monocytes % 5.4      Eosinophils % 2.3      Basophils % 0.6      Neutrophils Absolute 6.57      Immature Granulocytes Absolute, Automated 0.04      Lymphocytes Absolute 2.70      Monocytes Absolute 0.55      Eosinophils Absolute 0.23      Basophils Absolute 0.06     BASIC METABOLIC PANEL - Abnormal    Glucose 104 (*)     Sodium 136      Potassium 4.2      Chloride 105      Bicarbonate 19 (*)     Anion Gap 16      Urea Nitrogen 14      Creatinine 0.95      eGFR 73      Calcium 8.7     D-DIMER, NON VTE - Abnormal    D-Dimer Non VTE, Quant (ng/mL FEU) 696 (*)     Narrative:     The D-Dimer assay is reported in ng/mL Fibrinogen Equivalent Units (FEU). The results of this assay should NOT be used for the exclusion of Deep Vein Thrombosis and/or Pulmonary Embolism.   HEPATIC FUNCTION PANEL - Normal    Albumin 3.8      Bilirubin, Total 0.4       Bilirubin, Direct 0.1      Alkaline Phosphatase 44      ALT 11      AST 13      Total Protein 6.6     LIPASE - Normal    Lipase 33      Narrative:     Venipuncture immediately after or during the administration of Metamizole may lead to falsely low results. Testing should be performed immediately prior to Metamizole dosing.   PROTIME-INR - Normal    Protime 11.1      INR 1.0     APTT - Normal    aPTT 27      Narrative:     The APTT is no longer used for monitoring Unfractionated Heparin Therapy. For monitoring Heparin Therapy, use the Heparin Assay.   SARS-COV-2 PCR, SYMPTOMATIC - Normal    Coronavirus 2019, PCR Not Detected      Narrative:     This assay has received FDA Emergency Use Authorization (EUA) and is only authorized for the duration of time that circumstances exist to justify the authorization of the emergency use of in vitro diagnostic tests for the detection of SARS-CoV-2 virus and/or diagnosis of COVID-19 infection under section 564(b)(1) of the Act, 21 U.S.C. 360bbb-3(b)(1). This assay is an in vitro diagnostic nucleic acid amplification test for the qualitative detection of SARS-CoV-2 from nasopharyngeal specimens and has been validated for use at [facility]. Negative results do not preclude COVID-19 infections and should not be used as the sole basis for diagnosis, treatment, or other management decisions.     TROPONIN I, HIGH SENSITIVITY - Normal    Troponin I, High Sensitivity 3      Narrative:     Less than 99th percentile of normal range cutoff-  Female and children under 18 years old <14 ng/L; Male <21 ng/L: Negative  Repeat testing should be performed if clinically indicated.     Female and children under 18 years old 14-50 ng/L; Male 21-50 ng/L:  Consistent with possible cardiac damage and possible increased clinical   risk. Serial measurements may help to assess extent of myocardial damage.     >50 ng/L: Consistent with cardiac damage, increased clinical risk  and  myocardial infarction. Serial measurements may help assess extent of   myocardial damage.      NOTE: Children less than 1 year old may have higher baseline troponin   levels and results should be interpreted in conjunction with the overall   clinical context.     NOTE: Troponin I testing is performed using a different   testing methodology at [facility] than at other   system hospitals. Direct result comparisons should only   be made within the same method.   B-TYPE NATRIURETIC PEPTIDE - Normal    BNP 30      Narrative:        <100 pg/mL - Heart failure unlikely  100-299 pg/mL - Intermediate probability of acute heart                  failure exacerbation. Correlate with clinical                  context and patient history.    >=300 pg/mL - Heart Failure likely. Correlate with clinical                  context and patient history.    BNP testing is performed using different testing methodology at [facility] than at other [facility] hospitals. Direct result comparisons should only be made within the same method.           CT angio chest for pulmonary embolism   Final Result   1.  No evidence of pulmonary embolism or acute findings in the thorax.   2. Diffuse hepatic steatosis.             Signed by: Zac Kahn 11/5/2023 5:23 PM   Dictation workstation:   [id number]      XR chest 1 view   Final Result   1.  No evidence of acute cardiopulmonary process. Low lung volumes   with crowding of the markings.                  MACRO:   None        Signed by: Ricky Wesley 11/5/2023 3:15 PM   Dictation workstation:   [id number]           Procedures     Medical Decision Making  Patient with chest pain differential diagnosis included cardiac chest pain pneumonia chest wall strain pulmonary embolism costochondritis.  EKG performed was normal her troponin was negative also D-dimer was positive her CBC was normal with her H&H back to normal limits metabolic panel lipase PT PTT were all  normal COVID testing was negative.  A CT scan of the chest was performed because of positive D-dimer and it was also negative as well as her normal chest x-ray.  She received IV Hep-Lock cardiac monitoring and Tylenol initially.  Once her normal hemoglobin hematocrit was seen she received Toradol 30 mg intravenously as well as 4 mg of morphine sulfate for pain.  She had been on pain medication including gabapentin in the past she will be started on ibuprofen as well as Ultram and referred back to her primary care physician    Amount and/or Complexity of Data Reviewed  ECG/medicine tests: independent interpretation performed.     Details: Twelve-lead EKG showed sinus rhythm at 99/min there is no acute ST segment elevation depressions or arrhythmia impression normal twelve-lead EKG is interpreted by myself the emergency physician       CT angio chest for pulmonary embolism   Final Result   1.  No evidence of pulmonary embolism or acute findings in the thorax.   2. Diffuse hepatic steatosis.             Signed by: Zac Kahn 11/5/2023 5:23 PM   Dictation workstation:   [id number]      XR chest 1 view   Final Result   1.  No evidence of acute cardiopulmonary process. Low lung volumes   with crowding of the markings.                  MACRO:   None        Signed by: Ricky Wesley 11/5/2023 3:15 PM   Dictation workstation:   [id number]         Labs Reviewed   CBC WITH AUTO DIFFERENTIAL - Abnormal       Result Value    WBC 10.2      nRBC 0.0      RBC 5.00      Hemoglobin 11.5 (*)     Hematocrit 37.7      MCV 75 (*)     MCH 23.0 (*)     MCHC 30.5 (*)     RDW 17.3 (*)     Platelets 306      Neutrophils % 64.7      Immature Granulocytes %, Automated 0.4      Lymphocytes % 26.6      Monocytes % 5.4      Eosinophils % 2.3      Basophils % 0.6      Neutrophils Absolute 6.57      Immature Granulocytes Absolute, Automated 0.04      Lymphocytes Absolute 2.70      Monocytes Absolute 0.55      Eosinophils Absolute 0.23       Basophils Absolute 0.06     BASIC METABOLIC PANEL - Abnormal    Glucose 104 (*)     Sodium 136      Potassium 4.2      Chloride 105      Bicarbonate 19 (*)     Anion Gap 16      Urea Nitrogen 14      Creatinine 0.95      eGFR 73      Calcium 8.7     D-DIMER, NON VTE - Abnormal    D-Dimer Non VTE, Quant (ng/mL FEU) 696 (*)     Narrative:     The D-Dimer assay is reported in ng/mL Fibrinogen Equivalent Units (FEU). The results of this assay should NOT be used for the exclusion of Deep Vein Thrombosis and/or Pulmonary Embolism.   HEPATIC FUNCTION PANEL - Normal    Albumin 3.8      Bilirubin, Total 0.4      Bilirubin, Direct 0.1      Alkaline Phosphatase 44      ALT 11      AST 13      Total Protein 6.6     LIPASE - Normal    Lipase 33      Narrative:     Venipuncture immediately after or during the administration of Metamizole may lead to falsely low results. Testing should be performed immediately prior to Metamizole dosing.   PROTIME-INR - Normal    Protime 11.1      INR 1.0     APTT - Normal    aPTT 27      Narrative:     The APTT is no longer used for monitoring Unfractionated Heparin Therapy. For monitoring Heparin Therapy, use the Heparin Assay.   SARS-COV-2 PCR, SYMPTOMATIC - Normal    Coronavirus 2019, PCR Not Detected      Narrative:     This assay has received FDA Emergency Use Authorization (EUA) and is only authorized for the duration of time that circumstances exist to justify the authorization of the emergency use of in vitro diagnostic tests for the detection of SARS-CoV-2 virus and/or diagnosis of COVID-19 infection under section 564(b)(1) of the Act, 21 U.S.C. 360bbb-3(b)(1). This assay is an in vitro diagnostic nucleic acid amplification test for the qualitative detection of SARS-CoV-2 from nasopharyngeal specimens and has been validated for use at [facility]. Negative results do not preclude COVID-19 infections and should not be used as the sole basis for diagnosis,  treatment, or other management decisions.     TROPONIN I, HIGH SENSITIVITY - Normal    Troponin I, High Sensitivity 3      Narrative:     Less than 99th percentile of normal range cutoff-  Female and children under 18 years old <14 ng/L; Male <21 ng/L: Negative  Repeat testing should be performed if clinically indicated.     Female and children under 18 years old 14-50 ng/L; Male 21-50 ng/L:  Consistent with possible cardiac damage and possible increased clinical   risk. Serial measurements may help to assess extent of myocardial damage.     >50 ng/L: Consistent with cardiac damage, increased clinical risk and  myocardial infarction. Serial measurements may help assess extent of   myocardial damage.      NOTE: Children less than 1 year old may have higher baseline troponin   levels and results should be interpreted in conjunction with the overall   clinical context.     NOTE: Troponin I testing is performed using a different   testing methodology at [facility] than at other   [facility]. Direct result comparisons should only   be made within the same method.   B-TYPE NATRIURETIC PEPTIDE - Normal    BNP 30      Narrative:        <100 pg/mL - Heart failure unlikely  100-299 pg/mL - Intermediate probability of acute heart                  failure exacerbation. Correlate with clinical                  context and patient history.    >=300 pg/mL - Heart Failure likely. Correlate with clinical                  context and patient history.    BNP testing is performed using different testing methodology at [facility] than at other [facility]. Direct result comparisons should only be made within the same method.           Diagnoses as of 11/05/23 1745   Chest wall pain   Pleurisy                    Lukas Toro MD  11/05/23 1745

## 2023-11-10 ENCOUNTER — OFFICE VISIT (OUTPATIENT)
Dept: OBSTETRICS AND GYNECOLOGY | Facility: CLINIC | Age: 48
End: 2023-11-10
Payer: COMMERCIAL

## 2023-11-10 ENCOUNTER — TELEPHONE (OUTPATIENT)
Dept: PRIMARY CARE | Facility: CLINIC | Age: 48
End: 2023-11-10

## 2023-11-10 VITALS
WEIGHT: 189.2 LBS | HEIGHT: 65 IN | BODY MASS INDEX: 31.52 KG/M2 | SYSTOLIC BLOOD PRESSURE: 110 MMHG | DIASTOLIC BLOOD PRESSURE: 66 MMHG

## 2023-11-10 DIAGNOSIS — Z48.89 POSTOPERATIVE VISIT: Primary | ICD-10-CM

## 2023-11-10 PROCEDURE — 3074F SYST BP LT 130 MM HG: CPT | Performed by: OBSTETRICS & GYNECOLOGY

## 2023-11-10 PROCEDURE — 1036F TOBACCO NON-USER: CPT | Performed by: OBSTETRICS & GYNECOLOGY

## 2023-11-10 PROCEDURE — 99024 POSTOP FOLLOW-UP VISIT: CPT | Performed by: OBSTETRICS & GYNECOLOGY

## 2023-11-10 PROCEDURE — 3078F DIAST BP <80 MM HG: CPT | Performed by: OBSTETRICS & GYNECOLOGY

## 2023-11-10 PROCEDURE — 4010F ACE/ARB THERAPY RXD/TAKEN: CPT | Performed by: OBSTETRICS & GYNECOLOGY

## 2023-11-10 PROCEDURE — 3051F HG A1C>EQUAL 7.0%<8.0%: CPT | Performed by: OBSTETRICS & GYNECOLOGY

## 2023-11-10 NOTE — TELEPHONE ENCOUNTER
I'm not aware of any request to see her sooner, we would have contacted her.  If she wants she can move her regular appt up to Dec and have her labs drawn prior to that.

## 2023-11-10 NOTE — TELEPHONE ENCOUNTER
GRECIA stating she has apt with PENNIE 2/24; states has had some GYN issues recently and been in ER. Has had several labs drawn. States as she was looking over her results in My Chart she saw something about PENNIE wanting to see her before February. States if PENNIE does want to see her she will be glad to schedule an apt. Please advise, thanks.

## 2023-11-10 NOTE — PROGRESS NOTES
"Subjective   Patient ID: Nubia Martinez is a 48 y.o. female who presents for Post-op Visit (Patient is here for her post op visit. Patient has no concerns at this time, she just has some questions.).  HPI  Patient presents for postop visit following diagnostic hysteroscopy.  At the time of surgery due to cervical stenosis the uterine cavity cannot be visualized.  Discussed options with her following surgery and the decision was to place her on birth control pills on a continuous basis so that she have a menstrual flow only every 3 months.  She voices no complaints and is doing well since the surgery.    Review of Systems  Review of Systems:   Constitutional: No fever or chills  Respiratory: No shortness of breath, or cough  Cardiovascular: No chest pain or syncope  Breasts: No breast pain, no masses, no nipple discharge  Gastrointestinal: No nausea, vomiting, or diarrhea, no abdominal pain  Genitourinary: No dysuria or frequency  Gynecology: Negative except as noted in history of present illness  All other: All other systems reviewed and negative for complaint    Objective   /66   Ht 1.651 m (5' 5\")   Wt 85.8 kg (189 lb 3.2 oz)   LMP 10/07/2023 Comment: hcg in results=negative  BMI 31.48 kg/m²    Physical Exam  General: No acute distress  Eye: Intraocular movements are intact  HEENT: Normocephalic  Respiratory: Respirations are nonlabored  Gastrointestinal: Nondistended   Musculoskeletal: Normal range of motion  Neurologic: Alert and oriented x3  Psychiatric: Cooperative, appropriate mood and affect.    Assessment/Plan   Problem List Items Addressed This Visit    None  Visit Diagnoses       Postoperative visit    -  Primary        1.  Postop visit  Patient was shown pictures from the hysteroscopy showing cervical stenosis.  Patient already had a prescription for birth control pills to take on a continuous basis for menstrual flow every 3 months.  Patient informed that she will be on the birth control pills " for at least 1 to 2 years in order to transition her through menopause.  Would suggest obtaining FSH and LH in 1 years time.  Follow-up in 1 year for annual exam.

## 2023-12-01 ENCOUNTER — TELEPHONE (OUTPATIENT)
Dept: OBSTETRICS AND GYNECOLOGY | Facility: CLINIC | Age: 48
End: 2023-12-01
Payer: COMMERCIAL

## 2023-12-01 NOTE — TELEPHONE ENCOUNTER
Patient called and states with the birth control she is still having spotting bleeding every day for the last month along with leg cramps. Patient has currently been on this birth control for 3 months.

## 2023-12-19 ENCOUNTER — LAB (OUTPATIENT)
Dept: LAB | Facility: LAB | Age: 48
End: 2023-12-19
Payer: COMMERCIAL

## 2023-12-19 ENCOUNTER — TELEPHONE (OUTPATIENT)
Dept: PRIMARY CARE | Facility: CLINIC | Age: 48
End: 2023-12-19

## 2023-12-19 DIAGNOSIS — R25.2 LEG CRAMPS: Primary | ICD-10-CM

## 2023-12-19 DIAGNOSIS — D64.9 ANEMIA, UNSPECIFIED TYPE: ICD-10-CM

## 2023-12-19 DIAGNOSIS — E11.65 TYPE 2 DIABETES MELLITUS WITH HYPERGLYCEMIA, WITHOUT LONG-TERM CURRENT USE OF INSULIN (MULTI): ICD-10-CM

## 2023-12-19 DIAGNOSIS — R25.2 LEG CRAMPS: ICD-10-CM

## 2023-12-19 LAB
ALBUMIN SERPL BCP-MCNC: 4 G/DL (ref 3.4–5)
ALP SERPL-CCNC: 51 U/L (ref 33–110)
ALT SERPL W P-5'-P-CCNC: 14 U/L (ref 7–45)
ANION GAP SERPL CALC-SCNC: 16 MMOL/L (ref 10–20)
AST SERPL W P-5'-P-CCNC: 12 U/L (ref 9–39)
BASOPHILS # BLD AUTO: 0.05 X10*3/UL (ref 0–0.1)
BASOPHILS NFR BLD AUTO: 0.5 %
BILIRUB SERPL-MCNC: 0.3 MG/DL (ref 0–1.2)
BUN SERPL-MCNC: 16 MG/DL (ref 6–23)
CALCIUM SERPL-MCNC: 9 MG/DL (ref 8.6–10.3)
CHLORIDE SERPL-SCNC: 103 MMOL/L (ref 98–107)
CO2 SERPL-SCNC: 23 MMOL/L (ref 21–32)
CREAT SERPL-MCNC: 0.88 MG/DL (ref 0.5–1.05)
EOSINOPHIL # BLD AUTO: 0.51 X10*3/UL (ref 0–0.7)
EOSINOPHIL NFR BLD AUTO: 4.6 %
ERYTHROCYTE [DISTWIDTH] IN BLOOD BY AUTOMATED COUNT: 16.2 % (ref 11.5–14.5)
EST. AVERAGE GLUCOSE BLD GHB EST-MCNC: 166 MG/DL
GFR SERPL CREATININE-BSD FRML MDRD: 80 ML/MIN/1.73M*2
GLUCOSE SERPL-MCNC: 153 MG/DL (ref 74–99)
HBA1C MFR BLD: 7.4 %
HCT VFR BLD AUTO: 39.6 % (ref 36–46)
HGB BLD-MCNC: 11.9 G/DL (ref 12–16)
IMM GRANULOCYTES # BLD AUTO: 0.05 X10*3/UL (ref 0–0.7)
IMM GRANULOCYTES NFR BLD AUTO: 0.5 % (ref 0–0.9)
IRON SATN MFR SERPL: 7 % (ref 25–45)
IRON SERPL-MCNC: 33 UG/DL (ref 35–150)
LYMPHOCYTES # BLD AUTO: 3.14 X10*3/UL (ref 1.2–4.8)
LYMPHOCYTES NFR BLD AUTO: 28.4 %
MAGNESIUM SERPL-MCNC: 1.88 MG/DL (ref 1.6–2.4)
MCH RBC QN AUTO: 23.8 PG (ref 26–34)
MCHC RBC AUTO-ENTMCNC: 30.1 G/DL (ref 32–36)
MCV RBC AUTO: 79 FL (ref 80–100)
MONOCYTES # BLD AUTO: 0.6 X10*3/UL (ref 0.1–1)
MONOCYTES NFR BLD AUTO: 5.4 %
NEUTROPHILS # BLD AUTO: 6.72 X10*3/UL (ref 1.2–7.7)
NEUTROPHILS NFR BLD AUTO: 60.6 %
NRBC BLD-RTO: 0 /100 WBCS (ref 0–0)
PLATELET # BLD AUTO: 341 X10*3/UL (ref 150–450)
POTASSIUM SERPL-SCNC: 4.6 MMOL/L (ref 3.5–5.3)
PROT SERPL-MCNC: 6.3 G/DL (ref 6.4–8.2)
RBC # BLD AUTO: 5.01 X10*6/UL (ref 4–5.2)
SODIUM SERPL-SCNC: 137 MMOL/L (ref 136–145)
TIBC SERPL-MCNC: 472 UG/DL (ref 240–445)
UIBC SERPL-MCNC: 439 UG/DL (ref 110–370)
VIT B12 SERPL-MCNC: 403 PG/ML (ref 211–911)
WBC # BLD AUTO: 11.1 X10*3/UL (ref 4.4–11.3)

## 2023-12-19 PROCEDURE — 36415 COLL VENOUS BLD VENIPUNCTURE: CPT

## 2023-12-19 PROCEDURE — 82607 VITAMIN B-12: CPT

## 2023-12-19 PROCEDURE — 85025 COMPLETE CBC W/AUTO DIFF WBC: CPT

## 2023-12-19 PROCEDURE — 83036 HEMOGLOBIN GLYCOSYLATED A1C: CPT

## 2023-12-19 PROCEDURE — 83735 ASSAY OF MAGNESIUM: CPT

## 2023-12-19 PROCEDURE — 80053 COMPREHEN METABOLIC PANEL: CPT

## 2023-12-19 PROCEDURE — 83540 ASSAY OF IRON: CPT

## 2023-12-19 PROCEDURE — 83550 IRON BINDING TEST: CPT

## 2023-12-19 NOTE — TELEPHONE ENCOUNTER
Pt had lab drawn this mornng and is asking if you'll add a Magnesium  due to muscle cramps in her legs and feet?

## 2023-12-20 ENCOUNTER — OFFICE VISIT (OUTPATIENT)
Dept: OBSTETRICS AND GYNECOLOGY | Facility: CLINIC | Age: 48
End: 2023-12-20
Payer: COMMERCIAL

## 2023-12-20 VITALS
DIASTOLIC BLOOD PRESSURE: 68 MMHG | HEIGHT: 65 IN | BODY MASS INDEX: 31.82 KG/M2 | SYSTOLIC BLOOD PRESSURE: 126 MMHG | WEIGHT: 191 LBS

## 2023-12-20 DIAGNOSIS — Z01.419 ENCOUNTER FOR GYNECOLOGICAL EXAMINATION WITHOUT ABNORMAL FINDING: ICD-10-CM

## 2023-12-20 DIAGNOSIS — Z12.31 ENCOUNTER FOR SCREENING MAMMOGRAM FOR MALIGNANT NEOPLASM OF BREAST: ICD-10-CM

## 2023-12-20 DIAGNOSIS — Z12.4 PAP SMEAR FOR CERVICAL CANCER SCREENING: ICD-10-CM

## 2023-12-20 DIAGNOSIS — N92.0 MENORRHAGIA WITH REGULAR CYCLE: ICD-10-CM

## 2023-12-20 PROCEDURE — 88175 CYTOPATH C/V AUTO FLUID REDO: CPT

## 2023-12-20 PROCEDURE — 3078F DIAST BP <80 MM HG: CPT | Performed by: OBSTETRICS & GYNECOLOGY

## 2023-12-20 PROCEDURE — 1036F TOBACCO NON-USER: CPT | Performed by: OBSTETRICS & GYNECOLOGY

## 2023-12-20 PROCEDURE — 99396 PREV VISIT EST AGE 40-64: CPT | Performed by: OBSTETRICS & GYNECOLOGY

## 2023-12-20 PROCEDURE — 3074F SYST BP LT 130 MM HG: CPT | Performed by: OBSTETRICS & GYNECOLOGY

## 2023-12-20 PROCEDURE — 4010F ACE/ARB THERAPY RXD/TAKEN: CPT | Performed by: OBSTETRICS & GYNECOLOGY

## 2023-12-20 PROCEDURE — 3051F HG A1C>EQUAL 7.0%<8.0%: CPT | Performed by: OBSTETRICS & GYNECOLOGY

## 2023-12-20 RX ORDER — CETIRIZINE HYDROCHLORIDE 10 MG/1
10 TABLET ORAL DAILY PRN
Qty: 30 TABLET | Refills: 11
Start: 2023-12-20 | End: 2024-03-14 | Stop reason: SDUPTHER

## 2023-12-20 RX ORDER — DROSPIRENONE AND ETHINYL ESTRADIOL 0.03MG-3MG
1 KIT ORAL DAILY
Qty: 84 TABLET | Refills: 3 | Status: SHIPPED | OUTPATIENT
Start: 2023-12-20 | End: 2024-03-20 | Stop reason: ALTCHOICE

## 2023-12-20 NOTE — PROGRESS NOTES
Nubia Martinez is a 48 y.o. female who is here for a routine exam. PCP = Lisandra Denney MD    Chief Complaint   Patient presents with    Gynecologic Exam     Patient is here for a yearly exam. Patient needs a refill on her birth control. LMP 2023.         Presents for annual exam. She voices no complaints and is doing well. Denies any bowel or bladder problems. Denies any breast problems.   had vasectomy.  Patient recently started on birth control pills due to menorrhagia.    OB History          2    Para   2    Term   2       0    AB   0    Living   2         SAB   0    IAB   0    Ectopic   0    Multiple   0    Live Births   2                  Social History     Substance and Sexual Activity   Sexual Activity Yes    Partners: Male    Birth control/protection: OCP     Current contraception: Vasectomy    Past Medical History:   Diagnosis Date    Anemia     Diabetes (CMS/[facility])     Encounter for  delivery without indication     Delivery of pregnancy by  section    Encounter for gynecological examination (general) (routine) without abnormal findings 2021    Pap test, as part of routine gynecological examination    Fibroid     GERD (gastroesophageal reflux disease)     Gestational diabetes     Hyperlipidemia, unspecified 10/20/2022    Hyperlipidemia    Other recurrent depressive disorders (CMS/[facility]) 01/15/2020    Seasonal affective disorder    Other specified abnormal findings of blood chemistry 2022    Low vitamin D level       Past Surgical History:   Procedure Laterality Date    APPENDECTOMY       SECTION, LOW TRANSVERSE  2000    DILATION AND CURETTAGE OF UTERUS  10/26/2023    ENDOMETRIAL BIOPSY  2023    ESOPHAGOGASTRODUODENOSCOPY      HYSTEROSCOPY  10/26/2023    MYOMECTOMY         Past med hx and past surg hx reviewed and notable for: none    Review of Systems:   Constitutional: No fever or chills  Respiratory: No shortness of  "breath, or cough  Cardiovascular: No chest pain or syncope  Breasts: No breast pain, no masses, no nipple discharge  Gastrointestinal: No nausea, vomiting, or diarrhea, no abdominal pain  Genitourinary: No dysuria or frequency  Gynecology: Negative except as noted in history of present illness  All other: All other systems reviewed and negative for complaint    Objective   /68   Ht 1.651 m (5' 5\")   Wt 86.6 kg (191 lb)   LMP 12/01/2023 (Exact Date)   BMI 31.78 kg/m²     PHYSICAL EXAMINATION:  Well-developed, well nourished, in no acute distress, alert and oriented x three, is pleasant and cooperative.   HEENT: Clear. Pupils equal, round and reactive to light and accommodation. Extraocular muscles are intact. Oral mucosa pink without exudate.   NECK: No lymphadenopathy, no thyromegaly.  BREASTS: Symmetric, no palpable masses. No nipple discharge or retraction.  LUNGS: Clear bilaterally.  HEART: Regular rate and rhythm without murmurs.  ABDOMEN: Normoactive bowel sounds, soft and nontender, no guarding or rebound tenderness, no CVA tenderness.  EXTREMITIES: No clubbing, cyanosis or edema.  NEUROLOGIC:  Cranial nerves II-XII grossly intact.  :  Normal external female genitalia, normal vulva, normal vagina. Normal urethral meatus, urethra and bladder. Normal appearing cervix. Normal-sized uterus, no adnexal masses or tenderness. Pap smear performed today.      Actions performed during this visit include:  - Clinical breast exam  - Clinical pelvic exam  -   Orders Placed This Encounter   Procedures    BI mammo bilateral screening tomosynthesis     Standing Status:   Future     Standing Expiration Date:   2/20/2025     Order Specific Question:   Is the patient pregnant?     Answer:   No     Order Specific Question:   Reason for exam:     Answer:   Routine     Order Specific Question:   Radiologist to Determine Optimal Study     Answer:   Yes     Order Specific Question:   Release result to [employer]     Answer:  "  Immediate [1]     Order Specific Question:   Is this exam part of a Research Study? If Yes, link this order to the research study     Answer:   No        Problem List Items Addressed This Visit       Menorrhagia    Relevant Medications    drospirenone-ethinyl estradioL (Lula, Ocella) 3-0.03 mg tablet     Other Visit Diagnoses       Encounter for gynecological examination without abnormal finding        Relevant Orders    THINPREP PAP    Pap smear for cervical cancer screening        Relevant Orders    THINPREP PAP    Encounter for screening mammogram for malignant neoplasm of breast        Relevant Orders    BI mammo bilateral screening tomosynthesis             Provider Impression:  1.  Annual  2.  Screening mammogram  3.  History of menorrhagia  We will renew the birth control pills.    Thank you for coming to your annual exam. Your findings during the exam were normal.  Please return for your next visit in 1 year.

## 2023-12-21 ENCOUNTER — OFFICE VISIT (OUTPATIENT)
Dept: PRIMARY CARE | Facility: CLINIC | Age: 48
End: 2023-12-21
Payer: COMMERCIAL

## 2023-12-21 VITALS
HEIGHT: 65 IN | BODY MASS INDEX: 31.65 KG/M2 | DIASTOLIC BLOOD PRESSURE: 80 MMHG | WEIGHT: 190 LBS | HEART RATE: 88 BPM | SYSTOLIC BLOOD PRESSURE: 124 MMHG

## 2023-12-21 DIAGNOSIS — Z23 NEED FOR INFLUENZA VACCINATION: Primary | ICD-10-CM

## 2023-12-21 DIAGNOSIS — R06.09 DYSPNEA ON EXERTION: ICD-10-CM

## 2023-12-21 DIAGNOSIS — N92.4 EXCESSIVE BLEEDING IN PREMENOPAUSAL PERIOD: ICD-10-CM

## 2023-12-21 DIAGNOSIS — E11.65 TYPE 2 DIABETES MELLITUS WITH HYPERGLYCEMIA, WITHOUT LONG-TERM CURRENT USE OF INSULIN (MULTI): ICD-10-CM

## 2023-12-21 DIAGNOSIS — D50.8 OTHER IRON DEFICIENCY ANEMIA: ICD-10-CM

## 2023-12-21 DIAGNOSIS — J30.2 SEASONAL ALLERGIES: ICD-10-CM

## 2023-12-21 PROCEDURE — 90686 IIV4 VACC NO PRSV 0.5 ML IM: CPT | Performed by: FAMILY MEDICINE

## 2023-12-21 PROCEDURE — 1036F TOBACCO NON-USER: CPT | Performed by: FAMILY MEDICINE

## 2023-12-21 PROCEDURE — 3051F HG A1C>EQUAL 7.0%<8.0%: CPT | Performed by: FAMILY MEDICINE

## 2023-12-21 PROCEDURE — 99215 OFFICE O/P EST HI 40 MIN: CPT | Performed by: FAMILY MEDICINE

## 2023-12-21 PROCEDURE — 4010F ACE/ARB THERAPY RXD/TAKEN: CPT | Performed by: FAMILY MEDICINE

## 2023-12-21 PROCEDURE — 3079F DIAST BP 80-89 MM HG: CPT | Performed by: FAMILY MEDICINE

## 2023-12-21 PROCEDURE — 3074F SYST BP LT 130 MM HG: CPT | Performed by: FAMILY MEDICINE

## 2023-12-21 PROCEDURE — 90471 IMMUNIZATION ADMIN: CPT | Performed by: FAMILY MEDICINE

## 2023-12-21 NOTE — PROGRESS NOTES
Patient presents for periodic surveillance of chronic medical problems.     Subjective  Nubia Martinez is a 48 y.o. female who presents for Med check.  HPI  Pt states has a lot concerns to discuss today.  Recent diagnosis of iron deficiency anemia, had workup with gyn for heavy periods.  States people have told her she looks pale.  Her chiropractor recommended an iron supplement for her.   Developed leg cramps on OCP.  Discussed hydration, some people feel calcium or  magnesium supplements help, some people feel tonic water helps.  Going to cycle the OCP to have periods every 3 mos.  Continues to have abdominal cramping and bloating, discussed medications/diet as likely causes  Since last seen noticed shortness of breath, went to ER . Early  November.  States can't walk around the block without shortness of breath. Has allergies, compliant with regimen and does tend to wheeze with uri's, etc.  Reviewed Er workup, will get spirometry.  Thinks she has a rectal fissure, had one before.  Rectal area sore with bm.     DM, A1c up to 7.4, discussed changing medication, she's going to work on diet  Iron deficiency anemia, hb up to 11.9, would like to reassess in a few months, continue current regimen-had colonoscopy in 11/22, and negative fobt In 8/23  States chiropractor mentioned checking here about her sodium, advised I have no concerns with the level.      Review of Systems   All other systems reviewed and are negative.  .    Objective     Visit Vitals  /80 (BP Location: Left arm, Patient Position: Sitting)   Pulse 88      Physical Exam  Vitals and nursing note reviewed.   Constitutional:       General: She is not in acute distress.     Appearance: Normal appearance. She is not toxic-appearing.   HENT:      Head: Normocephalic and atraumatic.   Cardiovascular:      Rate and Rhythm: Normal rate and regular rhythm.      Heart sounds: No murmur heard.  Pulmonary:      Effort: Pulmonary effort is normal.      Breath  sounds: Normal breath sounds.   Musculoskeletal:      Comments: Normal gait   Skin:     Coloration: Skin is not pale.   Neurological:      General: No focal deficit present.      Mental Status: She is alert and oriented to person, place, and time.   Psychiatric:         Mood and Affect: Mood normal.         Behavior: Behavior normal.         Assessment/Plan   Problem List Items Addressed This Visit       Seasonal allergies    Relevant Medications    cetirizine (ZyrTEC) 10 mg tablet    Other Relevant Orders    Spirometry Pre/Post Bronchodilator    Type 2 diabetes mellitus with hyperglycemia (CMS/[facility])    Relevant Orders    CBC    Hemoglobin A1C    Lipid Panel    Comprehensive Metabolic Panel    TSH with reflex to Free T4 if abnormal    Iron and TIBC    Menorrhagia     Other Visit Diagnoses       Need for influenza vaccination    -  Primary    Relevant Orders    Flu vaccine (IIV4) age 6 months and greater, preservative free (Completed)    Dyspnea on exertion        Relevant Orders    Spirometry Pre/Post Bronchodilator    Other iron deficiency anemia                Time Spent  Prep time on day of patient encounter: 5 minutes  Time spent directly with patient, family or caregiver: 34 minutes  Documentation Time: 9 minutes          Lisandra Denney MD

## 2023-12-22 ENCOUNTER — APPOINTMENT (OUTPATIENT)
Dept: RESPIRATORY THERAPY | Facility: HOSPITAL | Age: 48
End: 2023-12-22
Payer: COMMERCIAL

## 2023-12-27 ENCOUNTER — HOSPITAL ENCOUNTER (OUTPATIENT)
Dept: RESPIRATORY THERAPY | Facility: HOSPITAL | Age: 48
Discharge: HOME | End: 2023-12-27
Payer: COMMERCIAL

## 2023-12-27 DIAGNOSIS — J30.2 SEASONAL ALLERGIES: ICD-10-CM

## 2023-12-27 DIAGNOSIS — R06.09 DYSPNEA ON EXERTION: ICD-10-CM

## 2024-01-03 ENCOUNTER — TELEPHONE (OUTPATIENT)
Dept: PRIMARY CARE | Facility: CLINIC | Age: 49
End: 2024-01-03
Payer: COMMERCIAL

## 2024-01-09 LAB
CYTOLOGY CMNT CVX/VAG CYTO-IMP: NORMAL
LAB AP CONTRACEPTIVE HISTORY: NORMAL
LAB AP HPV GENOTYPE QUESTION: YES
LAB AP HPV HR: NORMAL
LABORATORY COMMENT REPORT: NORMAL
LMP START DATE: NORMAL
PATH REPORT.TOTAL CANCER: NORMAL

## 2024-02-01 ENCOUNTER — APPOINTMENT (OUTPATIENT)
Dept: PRIMARY CARE | Facility: CLINIC | Age: 49
End: 2024-02-01
Payer: COMMERCIAL

## 2024-02-05 ENCOUNTER — TELEPHONE (OUTPATIENT)
Dept: OBSTETRICS AND GYNECOLOGY | Facility: CLINIC | Age: 49
End: 2024-02-05
Payer: COMMERCIAL

## 2024-02-05 ENCOUNTER — TELEPHONE (OUTPATIENT)
Dept: PRIMARY CARE | Facility: CLINIC | Age: 49
End: 2024-02-05
Payer: COMMERCIAL

## 2024-02-05 DIAGNOSIS — N92.1 MENOMETRORRHAGIA: Primary | ICD-10-CM

## 2024-02-05 RX ORDER — LEVONORGESTREL AND ETHINYL ESTRADIOL 0.15-0.03
KIT ORAL
Qty: 84 TABLET | Refills: 0 | Status: SHIPPED | OUTPATIENT
Start: 2024-02-05 | End: 2024-03-20 | Stop reason: ALTCHOICE

## 2024-02-05 NOTE — TELEPHONE ENCOUNTER
Patient called and states she is having heavy vaginal  bleeding for the last 15 days with large clots. She is currently on the active pills of her birth control. Patient uses [employer] in [place].

## 2024-02-05 NOTE — TELEPHONE ENCOUNTER
States was to get labs done at the end of this month for PENNIE; was to be on her placebo pills from [facility]'s at that time. Has been bleeding for 15 days straight and has not even been on the placebo pills yet. [facility] is going to call GYN about this issue but wonders if PENNIE wants to bump her labs back some until this issue gets straightened out? Please advise, thanks.

## 2024-02-13 ENCOUNTER — TELEPHONE (OUTPATIENT)
Dept: PRIMARY CARE | Facility: CLINIC | Age: 49
End: 2024-02-13
Payer: COMMERCIAL

## 2024-02-13 NOTE — TELEPHONE ENCOUNTER
LM stating GYN put her on Setlakin for a month to help her stop bleeding; which it has helped with that; bleeding finally stopped after 20 days. Was reading package insert and it says this med can affect certain lab test including triglycerides and cholesterol. Just wants to make sure PENNIE still wants her to have labs done before her upcoming apt since she is now on this med. Also states she has several things she wants to address with PENNIE and wonders if she should schedule a second apt to allow time to address everything?

## 2024-02-18 DIAGNOSIS — J30.2 SEASONAL ALLERGIES: ICD-10-CM

## 2024-02-19 DIAGNOSIS — I10 HYPERTENSION, UNSPECIFIED TYPE: ICD-10-CM

## 2024-02-19 DIAGNOSIS — Z00.00 HEALTHCARE MAINTENANCE: ICD-10-CM

## 2024-02-19 DIAGNOSIS — E11.65 TYPE 2 DIABETES MELLITUS WITH HYPERGLYCEMIA, WITHOUT LONG-TERM CURRENT USE OF INSULIN (MULTI): ICD-10-CM

## 2024-02-19 RX ORDER — METFORMIN HYDROCHLORIDE 500 MG/1
TABLET, EXTENDED RELEASE ORAL
Qty: 360 TABLET | Refills: 1 | Status: SHIPPED | OUTPATIENT
Start: 2024-02-19

## 2024-02-19 RX ORDER — LOSARTAN POTASSIUM 25 MG/1
25 TABLET ORAL DAILY
Qty: 90 TABLET | Refills: 1 | Status: SHIPPED | OUTPATIENT
Start: 2024-02-19

## 2024-02-19 RX ORDER — MONTELUKAST SODIUM 10 MG/1
10 TABLET ORAL DAILY
Qty: 90 TABLET | Refills: 1 | Status: SHIPPED | OUTPATIENT
Start: 2024-02-19

## 2024-02-19 RX ORDER — OMEPRAZOLE 40 MG/1
40 CAPSULE, DELAYED RELEASE ORAL DAILY
Qty: 90 CAPSULE | Refills: 1 | Status: SHIPPED | OUTPATIENT
Start: 2024-02-19

## 2024-02-19 RX ORDER — ATORVASTATIN CALCIUM 40 MG/1
40 TABLET, FILM COATED ORAL DAILY
Qty: 90 TABLET | Refills: 1 | Status: SHIPPED | OUTPATIENT
Start: 2024-02-19

## 2024-02-20 ENCOUNTER — TELEPHONE (OUTPATIENT)
Dept: PRIMARY CARE | Facility: CLINIC | Age: 49
End: 2024-02-20
Payer: COMMERCIAL

## 2024-02-29 ENCOUNTER — APPOINTMENT (OUTPATIENT)
Dept: PRIMARY CARE | Facility: CLINIC | Age: 49
End: 2024-02-29
Payer: COMMERCIAL

## 2024-03-04 ENCOUNTER — PREP FOR PROCEDURE (OUTPATIENT)
Dept: OBSTETRICS AND GYNECOLOGY | Facility: CLINIC | Age: 49
End: 2024-03-04

## 2024-03-04 ENCOUNTER — OFFICE VISIT (OUTPATIENT)
Dept: OBSTETRICS AND GYNECOLOGY | Facility: CLINIC | Age: 49
End: 2024-03-04
Payer: COMMERCIAL

## 2024-03-04 VITALS
SYSTOLIC BLOOD PRESSURE: 122 MMHG | HEIGHT: 65 IN | WEIGHT: 192.6 LBS | BODY MASS INDEX: 32.09 KG/M2 | DIASTOLIC BLOOD PRESSURE: 70 MMHG

## 2024-03-04 DIAGNOSIS — N92.1 MENOMETRORRHAGIA: ICD-10-CM

## 2024-03-04 DIAGNOSIS — D25.9 UTERINE LEIOMYOMA, UNSPECIFIED LOCATION: ICD-10-CM

## 2024-03-04 DIAGNOSIS — N92.1 MENOMETRORRHAGIA: Primary | ICD-10-CM

## 2024-03-04 DIAGNOSIS — D25.9 FIBROID UTERUS: Primary | ICD-10-CM

## 2024-03-04 PROCEDURE — 4010F ACE/ARB THERAPY RXD/TAKEN: CPT | Performed by: OBSTETRICS & GYNECOLOGY

## 2024-03-04 PROCEDURE — 3078F DIAST BP <80 MM HG: CPT | Performed by: OBSTETRICS & GYNECOLOGY

## 2024-03-04 PROCEDURE — 99213 OFFICE O/P EST LOW 20 MIN: CPT | Performed by: OBSTETRICS & GYNECOLOGY

## 2024-03-04 PROCEDURE — 3074F SYST BP LT 130 MM HG: CPT | Performed by: OBSTETRICS & GYNECOLOGY

## 2024-03-04 PROCEDURE — 1036F TOBACCO NON-USER: CPT | Performed by: OBSTETRICS & GYNECOLOGY

## 2024-03-04 RX ORDER — CEFAZOLIN SODIUM 2 G/100ML
2 INJECTION, SOLUTION INTRAVENOUS ONCE
Status: CANCELLED | OUTPATIENT
Start: 2024-03-04 | End: 2024-03-04

## 2024-03-04 RX ORDER — GABAPENTIN 600 MG/1
600 TABLET ORAL ONCE
Status: CANCELLED | OUTPATIENT
Start: 2024-03-04 | End: 2024-03-04

## 2024-03-04 RX ORDER — ACETAMINOPHEN 325 MG/1
975 TABLET ORAL ONCE
Status: CANCELLED | OUTPATIENT
Start: 2024-03-04 | End: 2024-03-04

## 2024-03-04 RX ORDER — CELECOXIB 400 MG/1
400 CAPSULE ORAL ONCE
Status: CANCELLED | OUTPATIENT
Start: 2024-03-04 | End: 2024-03-04

## 2024-03-04 NOTE — H&P (VIEW-ONLY)
Nubia Martinez is a 49 y.o. year old female patient.  PCP = Lisandra Denney MD    Chief Complaint   Patient presents with    Menstrual Problem     Patient is here due to excessive bleeding. Patient states she is still bleeding even after being on the stair step dose of birth control. Patient would like to discuss hysterectomy.        HPI   Presents stating that despite being on the birth control pill she still has irregular vaginal bleeding which can be heavy on occasion.  Patient wishes to have definitive treatment.  Patient desires hysterectomy.  She has had 2 previous  sections.   had a vasectomy.  Pelvic ultrasound in 2023 revealed several uterine fibroids.    OB History          2    Para   2    Term   2       0    AB   0    Living   2         SAB   0    IAB   0    Ectopic   0    Multiple   0    Live Births   2                 Past Medical History:   Diagnosis Date    Anemia     Diabetes (CMS/[facility])     Encounter for  delivery without indication     Delivery of pregnancy by  section    Encounter for gynecological examination (general) (routine) without abnormal findings 2021    Pap test, as part of routine gynecological examination    Fibroid     GERD (gastroesophageal reflux disease)     Gestational diabetes     Hyperlipidemia, unspecified 10/20/2022    Hyperlipidemia    Other recurrent depressive disorders (CMS/[facility]) 01/15/2020    Seasonal affective disorder    Other specified abnormal findings of blood chemistry 2022    Low vitamin D level       Past Surgical History:   Procedure Laterality Date    APPENDECTOMY  1986     SECTION, LOW TRANSVERSE  2000    DILATION AND CURETTAGE OF UTERUS  10/26/2023    ENDOMETRIAL BIOPSY  2023    ESOPHAGOGASTRODUODENOSCOPY  2010    HYSTEROSCOPY  10/26/2023    MYOMECTOMY         Review of Systems:   Constitutional: No fever or chills  Respiratory: No shortness of breath, or  cough  Cardiovascular: No chest pain or syncope  Breasts: No breast pain, no masses, no nipple discharge  Gastrointestinal: No nausea, vomiting, or diarrhea, no abdominal pain  Genitourinary: No dysuria or frequency  Gynecology: Negative except as noted in history of present illness  All other: All other systems reviewed and negative for complaint    Medication Documentation Review Audit       Reviewed by Alf Guillen MD (Physician) on 03/04/24 at 1043      Medication Order Taking? Sig Documenting Provider Last Dose Status      Discontinued 03/04/24 1032      Discontinued 03/04/24 1032      Discontinued 03/04/24 1032   albuterol 90 mcg/actuation inhaler [id number] No INHALE 2 PUFFS FOUR TIMES DAILY AS NEEDED Historical Provider, MD Taking Active   ascorbic acid (Vitamin C) 500 mg ER capsule [id number] No Take 1 capsule (500 mg) by mouth once daily. Historical Provider, MD Taking Active   atorvastatin (Lipitor) 40 mg tablet [id number]  TAKE ONE TABLET BY MOUTH ONCE DAILY Lisandra Denney MD  Active      Discontinued 03/04/24 1032      Discontinued 03/04/24 1032      Discontinued 03/04/24 1032   cetirizine (ZyrTEC) 10 mg tablet [id number] No Take 1 tablet (10 mg) by mouth once daily as needed for allergies. Lisandra Denney MD Taking Active   cyanocobalamin (Vitamin B-12) 1,000 mcg tablet [id number] No Take 100 mcg by mouth once daily. Historical Provider, MD Taking Active   drospirenone-ethinyl estradioL (Lula, Ocella) 3-0.03 mg tablet [id number] No Take 1 tablet by mouth once daily. Alf Guillen MD Taking Active   fluticasone (Flonase) 50 mcg/actuation nasal spray [id number] No Administer 2 sprays into each nostril once daily. Shake gently. Before first use, prime pump. After use, clean tip and replace cap. Historical Provider, MD Taking Active   FreeStyle Marciano sensor system (FreeStyle Marciano 14 Day Sensor) kit [id number] No Inject 1 each under the skin every 14 (fourteen) days. Lisandra Denney MD Taking Active       "Discontinued 03/04/24 1032      Discontinued 03/04/24 1032      Discontinued 03/04/24 1032   glimepiride (Amaryl) 1 mg tablet [id number] No TAKE ONE TABLET BY MOUTH ONCE DAILY Lisandra Denney MD Taking Active   levonorgestreL-ethinyl estrad (Seasonale) 0.15 mg-30 mcg (91) tablet [id number]  Take 1 tablet by mouth 4 times a day for 2 days, THEN 1 tablet 3 times a day for 2 days, THEN 1 tablet 2 times a day for 2 days, THEN 1 tablet once daily. Alf Guillen MD  Active   losartan (Cozaar) 25 mg tablet [id number]  TAKE ONE TABLET BY MOUTH ONCE DAILY AS DIRECTED Lisandra Denney MD  Active   metFORMIN  mg 24 hr tablet [id number]  TAKE FOUR TABLETS BY MOUTH ONCE DAILY Lisandra Denney MD  Active   montelukast (Singulair) 10 mg tablet [id number]  TAKE ONE TABLET BY MOUTH ONCE DAILY Lisandra Denney MD  Active   omeprazole (PriLOSEC) 40 mg DR capsule [id number]  TAKE ONE CAPSULE BY MOUTH ONCE DAILY Lisandra Denney MD  Active                     /70   Ht 1.651 m (5' 5\")   Wt 87.4 kg (192 lb 9.6 oz)   BMI 32.05 kg/m²     PHYSICAL EXAMINATION:  Well-developed, well nourished, in no acute distress, alert and oriented x three, is pleasant and cooperative.  HEENT: Clear. Pupils equal, round and reactive to light and accommodation. Extraocular muscles are intact. Oral mucosa pink without exudate.   NECK: No lymphadenopathy, no thyromegaly.  LUNGS: Clear bilaterally.  HEART: Regular rate and rhythm without murmurs.  ABDOMEN: Normoactive bowel sounds, soft and nontender, no guarding or rebound tenderness, no CVA tenderness.  EXTREMITIES: No clubbing, cyanosis or edema.  NEUROLOGIC:  Cranial nerves II-XII grossly intact.        No orders of the defined types were placed in this encounter.       Problem List Items Addressed This Visit       Menometrorrhagia - Primary    Uterine leiomyoma        Provider Impression:  1.  Menometrorrhagia  2.  Uterine fibroids  In light of inability to perform endometrial ablation secondary to " cervical stenosis and that she continues to have irregular vaginal bleeding on hormonal contraceptive, recommend to proceed with total abdominal hysterectomy.  Also, discussed removing the ovaries and tubes at time of surgery she is 49 years old.  Therefore, recommend total abdominal hysterectomy with bilateral salpingo-oophorectomy.  She was informed that she can be placed on estrogen replacement therapy after surgery.  Patient informed of risks of surgery including risk of anesthesia, infection, bleeding, injury to internal organs including bowel, bladder or ureter, uterine perforation or fistula formation. Her questions were answered to her satisfaction and she agrees to undergo the procedure.  Patient informed of risks of surgery including risk of anesthesia, infection, bleeding, injury to internal organs including bowel, bladder or ureter, uterine perforation or fistula formation. Her questions were answered to her satisfaction and she agrees to undergo the procedure.

## 2024-03-04 NOTE — PROGRESS NOTES
Nubia Martinez is a 49 y.o. year old female patient.  PCP = Lisandra Denney MD    Chief Complaint   Patient presents with    Menstrual Problem     Patient is here due to excessive bleeding. Patient states she is still bleeding even after being on the stair step dose of birth control. Patient would like to discuss hysterectomy.        HPI   Presents stating that despite being on the birth control pill she still has irregular vaginal bleeding which can be heavy on occasion.  Patient wishes to have definitive treatment.  Patient desires hysterectomy.  She has had 2 previous  sections.   had a vasectomy.  Pelvic ultrasound in 2023 revealed several uterine fibroids.    OB History          2    Para   2    Term   2       0    AB   0    Living   2         SAB   0    IAB   0    Ectopic   0    Multiple   0    Live Births   2                 Past Medical History:   Diagnosis Date    Anemia     Diabetes (CMS/[facility])     Encounter for  delivery without indication     Delivery of pregnancy by  section    Encounter for gynecological examination (general) (routine) without abnormal findings 2021    Pap test, as part of routine gynecological examination    Fibroid     GERD (gastroesophageal reflux disease)     Gestational diabetes     Hyperlipidemia, unspecified 10/20/2022    Hyperlipidemia    Other recurrent depressive disorders (CMS/[facility]) 01/15/2020    Seasonal affective disorder    Other specified abnormal findings of blood chemistry 2022    Low vitamin D level       Past Surgical History:   Procedure Laterality Date    APPENDECTOMY  1986     SECTION, LOW TRANSVERSE  2000    DILATION AND CURETTAGE OF UTERUS  10/26/2023    ENDOMETRIAL BIOPSY  2023    ESOPHAGOGASTRODUODENOSCOPY  2010    HYSTEROSCOPY  10/26/2023    MYOMECTOMY         Review of Systems:   Constitutional: No fever or chills  Respiratory: No shortness of breath, or  cough  Cardiovascular: No chest pain or syncope  Breasts: No breast pain, no masses, no nipple discharge  Gastrointestinal: No nausea, vomiting, or diarrhea, no abdominal pain  Genitourinary: No dysuria or frequency  Gynecology: Negative except as noted in history of present illness  All other: All other systems reviewed and negative for complaint    Medication Documentation Review Audit       Reviewed by Alf Guillen MD (Physician) on 03/04/24 at 1043      Medication Order Taking? Sig Documenting Provider Last Dose Status      Discontinued 03/04/24 1032      Discontinued 03/04/24 1032      Discontinued 03/04/24 1032   albuterol 90 mcg/actuation inhaler [id number] No INHALE 2 PUFFS FOUR TIMES DAILY AS NEEDED Historical Provider, MD Taking Active   ascorbic acid (Vitamin C) 500 mg ER capsule [id number] No Take 1 capsule (500 mg) by mouth once daily. Historical Provider, MD Taking Active   atorvastatin (Lipitor) 40 mg tablet [id number]  TAKE ONE TABLET BY MOUTH ONCE DAILY Lisandra Denney MD  Active      Discontinued 03/04/24 1032      Discontinued 03/04/24 1032      Discontinued 03/04/24 1032   cetirizine (ZyrTEC) 10 mg tablet [id number] No Take 1 tablet (10 mg) by mouth once daily as needed for allergies. iLsandra Denney MD Taking Active   cyanocobalamin (Vitamin B-12) 1,000 mcg tablet [id number] No Take 100 mcg by mouth once daily. Historical Provider, MD Taking Active   drospirenone-ethinyl estradioL (Lula, Ocella) 3-0.03 mg tablet [id number] No Take 1 tablet by mouth once daily. Alf Guillen MD Taking Active   fluticasone (Flonase) 50 mcg/actuation nasal spray [id number] No Administer 2 sprays into each nostril once daily. Shake gently. Before first use, prime pump. After use, clean tip and replace cap. Historical Provider, MD Taking Active   FreeStyle Marciano sensor system (FreeStyle Marciano 14 Day Sensor) kit [id number] No Inject 1 each under the skin every 14 (fourteen) days. Lisandra Denney MD Taking Active       "Discontinued 03/04/24 1032      Discontinued 03/04/24 1032      Discontinued 03/04/24 1032   glimepiride (Amaryl) 1 mg tablet [id number] No TAKE ONE TABLET BY MOUTH ONCE DAILY Lisandra Denney MD Taking Active   levonorgestreL-ethinyl estrad (Seasonale) 0.15 mg-30 mcg (91) tablet [id number]  Take 1 tablet by mouth 4 times a day for 2 days, THEN 1 tablet 3 times a day for 2 days, THEN 1 tablet 2 times a day for 2 days, THEN 1 tablet once daily. Alf Guillen MD  Active   losartan (Cozaar) 25 mg tablet [id number]  TAKE ONE TABLET BY MOUTH ONCE DAILY AS DIRECTED Lisandra Denney MD  Active   metFORMIN  mg 24 hr tablet [id number]  TAKE FOUR TABLETS BY MOUTH ONCE DAILY Lisandra Denney MD  Active   montelukast (Singulair) 10 mg tablet [id number]  TAKE ONE TABLET BY MOUTH ONCE DAILY Lisandra Denney MD  Active   omeprazole (PriLOSEC) 40 mg DR capsule [id number]  TAKE ONE CAPSULE BY MOUTH ONCE DAILY Lisandra Denney MD  Active                     /70   Ht 1.651 m (5' 5\")   Wt 87.4 kg (192 lb 9.6 oz)   BMI 32.05 kg/m²     PHYSICAL EXAMINATION:  Well-developed, well nourished, in no acute distress, alert and oriented x three, is pleasant and cooperative.  HEENT: Clear. Pupils equal, round and reactive to light and accommodation. Extraocular muscles are intact. Oral mucosa pink without exudate.   NECK: No lymphadenopathy, no thyromegaly.  LUNGS: Clear bilaterally.  HEART: Regular rate and rhythm without murmurs.  ABDOMEN: Normoactive bowel sounds, soft and nontender, no guarding or rebound tenderness, no CVA tenderness.  EXTREMITIES: No clubbing, cyanosis or edema.  NEUROLOGIC:  Cranial nerves II-XII grossly intact.        No orders of the defined types were placed in this encounter.       Problem List Items Addressed This Visit       Menometrorrhagia - Primary    Uterine leiomyoma        Provider Impression:  1.  Menometrorrhagia  2.  Uterine fibroids  In light of inability to perform endometrial ablation secondary to " cervical stenosis and that she continues to have irregular vaginal bleeding on hormonal contraceptive, recommend to proceed with total abdominal hysterectomy.  Also, discussed removing the ovaries and tubes at time of surgery she is 49 years old.  Therefore, recommend total abdominal hysterectomy with bilateral salpingo-oophorectomy.  She was informed that she can be placed on estrogen replacement therapy after surgery.  Patient informed of risks of surgery including risk of anesthesia, infection, bleeding, injury to internal organs including bowel, bladder or ureter, uterine perforation or fistula formation. Her questions were answered to her satisfaction and she agrees to undergo the procedure.  Patient informed of risks of surgery including risk of anesthesia, infection, bleeding, injury to internal organs including bowel, bladder or ureter, uterine perforation or fistula formation. Her questions were answered to her satisfaction and she agrees to undergo the procedure.

## 2024-03-11 DIAGNOSIS — E11.65 TYPE 2 DIABETES MELLITUS WITH HYPERGLYCEMIA, WITHOUT LONG-TERM CURRENT USE OF INSULIN (MULTI): ICD-10-CM

## 2024-03-11 RX ORDER — FLASH GLUCOSE SENSOR
1 KIT MISCELLANEOUS
Qty: 1 EACH | Refills: 5 | Status: SHIPPED | OUTPATIENT
Start: 2024-03-11 | End: 2024-03-14 | Stop reason: SDUPTHER

## 2024-03-14 DIAGNOSIS — J30.2 SEASONAL ALLERGIES: ICD-10-CM

## 2024-03-14 DIAGNOSIS — E11.65 TYPE 2 DIABETES MELLITUS WITH HYPERGLYCEMIA, WITHOUT LONG-TERM CURRENT USE OF INSULIN (MULTI): ICD-10-CM

## 2024-03-14 RX ORDER — CETIRIZINE HYDROCHLORIDE 10 MG/1
10 TABLET ORAL DAILY PRN
Qty: 30 TABLET | Refills: 11 | Status: SHIPPED | OUTPATIENT
Start: 2024-03-14

## 2024-03-14 RX ORDER — FLASH GLUCOSE SENSOR
1 KIT MISCELLANEOUS
Qty: 2 EACH | Refills: 5 | Status: SHIPPED | OUTPATIENT
Start: 2024-03-14

## 2024-03-18 ENCOUNTER — LAB (OUTPATIENT)
Dept: LAB | Facility: LAB | Age: 49
End: 2024-03-18
Payer: COMMERCIAL

## 2024-03-18 DIAGNOSIS — E11.65 TYPE 2 DIABETES MELLITUS WITH HYPERGLYCEMIA, WITHOUT LONG-TERM CURRENT USE OF INSULIN (MULTI): ICD-10-CM

## 2024-03-18 LAB
ALBUMIN SERPL BCP-MCNC: 3.9 G/DL (ref 3.4–5)
ALP SERPL-CCNC: 57 U/L (ref 33–110)
ALT SERPL W P-5'-P-CCNC: 42 U/L (ref 7–45)
ANION GAP SERPL CALC-SCNC: 12 MMOL/L (ref 10–20)
AST SERPL W P-5'-P-CCNC: 28 U/L (ref 9–39)
BILIRUB SERPL-MCNC: 0.3 MG/DL (ref 0–1.2)
BUN SERPL-MCNC: 11 MG/DL (ref 6–23)
CALCIUM SERPL-MCNC: 9.1 MG/DL (ref 8.6–10.3)
CHLORIDE SERPL-SCNC: 103 MMOL/L (ref 98–107)
CHOLEST SERPL-MCNC: 144 MG/DL (ref 0–199)
CHOLESTEROL/HDL RATIO: 4
CO2 SERPL-SCNC: 29 MMOL/L (ref 21–32)
CREAT SERPL-MCNC: 0.75 MG/DL (ref 0.5–1.05)
EGFRCR SERPLBLD CKD-EPI 2021: >90 ML/MIN/1.73M*2
ERYTHROCYTE [DISTWIDTH] IN BLOOD BY AUTOMATED COUNT: 14.5 % (ref 11.5–14.5)
EST. AVERAGE GLUCOSE BLD GHB EST-MCNC: 157 MG/DL
GLUCOSE SERPL-MCNC: 141 MG/DL (ref 74–99)
HBA1C MFR BLD: 7.1 %
HCT VFR BLD AUTO: 37.9 % (ref 36–46)
HDLC SERPL-MCNC: 36 MG/DL
HGB BLD-MCNC: 11 G/DL (ref 12–16)
IRON SATN MFR SERPL: 7 % (ref 25–45)
IRON SERPL-MCNC: 29 UG/DL (ref 35–150)
LDLC SERPL CALC-MCNC: 54 MG/DL
MCH RBC QN AUTO: 24.1 PG (ref 26–34)
MCHC RBC AUTO-ENTMCNC: 29 G/DL (ref 32–36)
MCV RBC AUTO: 83 FL (ref 80–100)
NON HDL CHOLESTEROL: 108 MG/DL (ref 0–149)
NRBC BLD-RTO: 0 /100 WBCS (ref 0–0)
PLATELET # BLD AUTO: 352 X10*3/UL (ref 150–450)
POTASSIUM SERPL-SCNC: 4.2 MMOL/L (ref 3.5–5.3)
PROT SERPL-MCNC: 6 G/DL (ref 6.4–8.2)
RBC # BLD AUTO: 4.56 X10*6/UL (ref 4–5.2)
SODIUM SERPL-SCNC: 140 MMOL/L (ref 136–145)
TIBC SERPL-MCNC: 437 UG/DL (ref 240–445)
TRIGL SERPL-MCNC: 269 MG/DL (ref 0–149)
TSH SERPL-ACNC: 2.06 MIU/L (ref 0.44–3.98)
UIBC SERPL-MCNC: 408 UG/DL (ref 110–370)
VLDL: 54 MG/DL (ref 0–40)
WBC # BLD AUTO: 8.6 X10*3/UL (ref 4.4–11.3)

## 2024-03-18 PROCEDURE — 83550 IRON BINDING TEST: CPT

## 2024-03-18 PROCEDURE — 83036 HEMOGLOBIN GLYCOSYLATED A1C: CPT

## 2024-03-18 PROCEDURE — 84443 ASSAY THYROID STIM HORMONE: CPT

## 2024-03-18 PROCEDURE — 80053 COMPREHEN METABOLIC PANEL: CPT

## 2024-03-18 PROCEDURE — 36415 COLL VENOUS BLD VENIPUNCTURE: CPT

## 2024-03-18 PROCEDURE — 85027 COMPLETE CBC AUTOMATED: CPT

## 2024-03-18 PROCEDURE — 83540 ASSAY OF IRON: CPT

## 2024-03-18 PROCEDURE — 80061 LIPID PANEL: CPT

## 2024-03-19 PROBLEM — N92.0 MENORRHAGIA: Status: RESOLVED | Noted: 2023-10-06 | Resolved: 2024-03-19

## 2024-03-20 ENCOUNTER — OFFICE VISIT (OUTPATIENT)
Dept: PRIMARY CARE | Facility: CLINIC | Age: 49
End: 2024-03-20
Payer: COMMERCIAL

## 2024-03-20 VITALS
BODY MASS INDEX: 32.15 KG/M2 | HEART RATE: 100 BPM | WEIGHT: 193 LBS | HEIGHT: 65 IN | DIASTOLIC BLOOD PRESSURE: 80 MMHG | SYSTOLIC BLOOD PRESSURE: 138 MMHG

## 2024-03-20 DIAGNOSIS — E78.49 OTHER HYPERLIPIDEMIA: Primary | ICD-10-CM

## 2024-03-20 DIAGNOSIS — D50.0 ANEMIA DUE TO BLOOD LOSS, CHRONIC: ICD-10-CM

## 2024-03-20 DIAGNOSIS — J30.2 SEASONAL ALLERGIES: ICD-10-CM

## 2024-03-20 DIAGNOSIS — E11.65 TYPE 2 DIABETES MELLITUS WITH HYPERGLYCEMIA, WITHOUT LONG-TERM CURRENT USE OF INSULIN (MULTI): ICD-10-CM

## 2024-03-20 PROCEDURE — 1036F TOBACCO NON-USER: CPT | Performed by: FAMILY MEDICINE

## 2024-03-20 PROCEDURE — 99214 OFFICE O/P EST MOD 30 MIN: CPT | Performed by: FAMILY MEDICINE

## 2024-03-20 PROCEDURE — 4010F ACE/ARB THERAPY RXD/TAKEN: CPT | Performed by: FAMILY MEDICINE

## 2024-03-20 PROCEDURE — 3048F LDL-C <100 MG/DL: CPT | Performed by: FAMILY MEDICINE

## 2024-03-20 PROCEDURE — 3079F DIAST BP 80-89 MM HG: CPT | Performed by: FAMILY MEDICINE

## 2024-03-20 PROCEDURE — 3051F HG A1C>EQUAL 7.0%<8.0%: CPT | Performed by: FAMILY MEDICINE

## 2024-03-20 PROCEDURE — 3075F SYST BP GE 130 - 139MM HG: CPT | Performed by: FAMILY MEDICINE

## 2024-03-20 NOTE — PROGRESS NOTES
Patient presents for periodic surveillance of chronic medical problems.     Subjective  Nubia Martinez is a 49 y.o. female who presents for Annual Exam.  HPI  States having a lot of diarrhea, and a lot of gas.  Has been to GI in past.    Using preparation H and that has helped the rectal issues.  Having issue with allergy issues.  Having over the past year skin lesions, wonders about fabric allergies.   States she covered her chair with sheets and her symptoms went away .   Has anemia felt due to menometrorrhagia, planning for hysterectomy next week.     DM well controlled, A1c 7.1  Hyperlipidemia on statin  Elevated bp today, upper limits of normal, will monitor  Due for shingrix number 2, plans to get while off work for surgery recovery.    Review of Systems   All other systems reviewed and are negative.  .    Objective     Visit Vitals  /80 (BP Location: Left arm, Patient Position: Sitting)   Pulse 100      Physical Exam  Vitals reviewed.   HENT:      Head: Normocephalic.   Eyes:      General: No scleral icterus.  Cardiovascular:      Rate and Rhythm: Normal rate and regular rhythm.   Pulmonary:      Effort: Pulmonary effort is normal.      Breath sounds: Normal breath sounds.   Skin:     Coloration: Skin is not pale.   Neurological:      General: No focal deficit present.      Mental Status: She is alert. Mental status is at baseline.         Assessment/Plan   Problem List Items Addressed This Visit       Hyperlipidemia - Primary    Seasonal allergies    Type 2 diabetes mellitus with hyperglycemia (CMS/HCC)    Relevant Orders    CBC and Auto Differential    Iron and TIBC    Hemoglobin A1C    Basic Metabolic Panel     Other Visit Diagnoses       Anemia due to blood loss, chronic                       Lisandra Denney MD

## 2024-03-22 RX ORDER — CALCIUM CARBONATE 200(500)MG
2 TABLET,CHEWABLE ORAL DAILY
COMMUNITY

## 2024-03-22 NOTE — PREPROCEDURE INSTRUCTIONS
No outpatient medications have been marked as taking for the 3/28/24 encounter (Hospital Encounter).                       NPO Instructions:    May have clear liquids until 2 hours prior to arrival , drink clear ensure as directed      Additional Instructions:     Will receive call day before surgery with arrival time

## 2024-03-25 ENCOUNTER — LAB (OUTPATIENT)
Dept: LAB | Facility: LAB | Age: 49
End: 2024-03-25
Payer: COMMERCIAL

## 2024-03-25 DIAGNOSIS — D25.9 UTERINE LEIOMYOMA, UNSPECIFIED LOCATION: ICD-10-CM

## 2024-03-25 DIAGNOSIS — N92.1 MENOMETRORRHAGIA: ICD-10-CM

## 2024-03-25 LAB
ABO GROUP (TYPE) IN BLOOD: NORMAL
ANION GAP SERPL CALC-SCNC: 14 MMOL/L (ref 10–20)
ANTIBODY SCREEN: NORMAL
BUN SERPL-MCNC: 14 MG/DL (ref 6–23)
CALCIUM SERPL-MCNC: 9.4 MG/DL (ref 8.6–10.3)
CHLORIDE SERPL-SCNC: 103 MMOL/L (ref 98–107)
CO2 SERPL-SCNC: 26 MMOL/L (ref 21–32)
CREAT SERPL-MCNC: 0.71 MG/DL (ref 0.5–1.05)
EGFRCR SERPLBLD CKD-EPI 2021: >90 ML/MIN/1.73M*2
ERYTHROCYTE [DISTWIDTH] IN BLOOD BY AUTOMATED COUNT: 14.6 % (ref 11.5–14.5)
GLUCOSE SERPL-MCNC: 169 MG/DL (ref 74–99)
HCG UR QL IA.RAPID: NEGATIVE
HCT VFR BLD AUTO: 37.3 % (ref 36–46)
HGB BLD-MCNC: 10.9 G/DL (ref 12–16)
MCH RBC QN AUTO: 24 PG (ref 26–34)
MCHC RBC AUTO-ENTMCNC: 29.2 G/DL (ref 32–36)
MCV RBC AUTO: 82 FL (ref 80–100)
NRBC BLD-RTO: 0 /100 WBCS (ref 0–0)
PLATELET # BLD AUTO: 343 X10*3/UL (ref 150–450)
POTASSIUM SERPL-SCNC: 4.5 MMOL/L (ref 3.5–5.3)
RBC # BLD AUTO: 4.54 X10*6/UL (ref 4–5.2)
RH FACTOR (ANTIGEN D): NORMAL
SODIUM SERPL-SCNC: 138 MMOL/L (ref 136–145)
WBC # BLD AUTO: 9.2 X10*3/UL (ref 4.4–11.3)

## 2024-03-25 PROCEDURE — 85027 COMPLETE CBC AUTOMATED: CPT

## 2024-03-25 PROCEDURE — 86850 RBC ANTIBODY SCREEN: CPT

## 2024-03-25 PROCEDURE — 80048 BASIC METABOLIC PNL TOTAL CA: CPT

## 2024-03-25 PROCEDURE — 86900 BLOOD TYPING SEROLOGIC ABO: CPT

## 2024-03-25 PROCEDURE — 36415 COLL VENOUS BLD VENIPUNCTURE: CPT

## 2024-03-25 PROCEDURE — 86901 BLOOD TYPING SEROLOGIC RH(D): CPT

## 2024-03-25 PROCEDURE — 81025 URINE PREGNANCY TEST: CPT

## 2024-03-28 ENCOUNTER — HOSPITAL ENCOUNTER (INPATIENT)
Facility: HOSPITAL | Age: 49
LOS: 2 days | Discharge: HOME | DRG: 742 | End: 2024-03-30
Attending: OBSTETRICS & GYNECOLOGY | Admitting: OBSTETRICS & GYNECOLOGY
Payer: COMMERCIAL

## 2024-03-28 ENCOUNTER — ANESTHESIA EVENT (OUTPATIENT)
Dept: OPERATING ROOM | Facility: HOSPITAL | Age: 49
DRG: 742 | End: 2024-03-28
Payer: COMMERCIAL

## 2024-03-28 ENCOUNTER — ANESTHESIA (OUTPATIENT)
Dept: OPERATING ROOM | Facility: HOSPITAL | Age: 49
DRG: 742 | End: 2024-03-28
Payer: COMMERCIAL

## 2024-03-28 DIAGNOSIS — N92.1 MENOMETRORRHAGIA: ICD-10-CM

## 2024-03-28 DIAGNOSIS — D25.9 UTERINE LEIOMYOMA, UNSPECIFIED LOCATION: ICD-10-CM

## 2024-03-28 DIAGNOSIS — D25.9 FIBROID UTERUS: Primary | ICD-10-CM

## 2024-03-28 LAB
GLUCOSE BLD MANUAL STRIP-MCNC: 132 MG/DL (ref 74–99)
GLUCOSE BLD MANUAL STRIP-MCNC: 168 MG/DL (ref 74–99)
GLUCOSE BLD MANUAL STRIP-MCNC: 168 MG/DL (ref 74–99)
GLUCOSE BLD MANUAL STRIP-MCNC: 208 MG/DL (ref 74–99)

## 2024-03-28 PROCEDURE — 94668 MNPJ CHEST WALL SBSQ: CPT | Performed by: OBSTETRICS & GYNECOLOGY

## 2024-03-28 PROCEDURE — 94667 MNPJ CHEST WALL 1ST: CPT

## 2024-03-28 PROCEDURE — 0UT90ZZ RESECTION OF UTERUS, OPEN APPROACH: ICD-10-PCS | Performed by: OBSTETRICS & GYNECOLOGY

## 2024-03-28 PROCEDURE — 3600000009 HC OR TIME - EACH INCREMENTAL 1 MINUTE - PROCEDURE LEVEL FOUR: Performed by: OBSTETRICS & GYNECOLOGY

## 2024-03-28 PROCEDURE — 2500000004 HC RX 250 GENERAL PHARMACY W/ HCPCS (ALT 636 FOR OP/ED): Performed by: ANESTHESIOLOGY

## 2024-03-28 PROCEDURE — 2500000004 HC RX 250 GENERAL PHARMACY W/ HCPCS (ALT 636 FOR OP/ED): Performed by: OBSTETRICS & GYNECOLOGY

## 2024-03-28 PROCEDURE — 88307 TISSUE EXAM BY PATHOLOGIST: CPT | Mod: TC,SUR,SAMLAB | Performed by: OBSTETRICS & GYNECOLOGY

## 2024-03-28 PROCEDURE — 88307 TISSUE EXAM BY PATHOLOGIST: CPT | Performed by: PATHOLOGY

## 2024-03-28 PROCEDURE — 7100000002 HC RECOVERY ROOM TIME - EACH INCREMENTAL 1 MINUTE: Performed by: OBSTETRICS & GYNECOLOGY

## 2024-03-28 PROCEDURE — 3700000002 HC GENERAL ANESTHESIA TIME - EACH INCREMENTAL 1 MINUTE: Performed by: OBSTETRICS & GYNECOLOGY

## 2024-03-28 PROCEDURE — 94668 MNPJ CHEST WALL SBSQ: CPT

## 2024-03-28 PROCEDURE — 0UT20ZZ RESECTION OF BILATERAL OVARIES, OPEN APPROACH: ICD-10-PCS | Performed by: OBSTETRICS & GYNECOLOGY

## 2024-03-28 PROCEDURE — 1100000001 HC PRIVATE ROOM DAILY

## 2024-03-28 PROCEDURE — 94667 MNPJ CHEST WALL 1ST: CPT | Performed by: OBSTETRICS & GYNECOLOGY

## 2024-03-28 PROCEDURE — 2500000005 HC RX 250 GENERAL PHARMACY W/O HCPCS: Performed by: ANESTHESIOLOGY

## 2024-03-28 PROCEDURE — 2500000001 HC RX 250 WO HCPCS SELF ADMINISTERED DRUGS (ALT 637 FOR MEDICARE OP): Performed by: OBSTETRICS & GYNECOLOGY

## 2024-03-28 PROCEDURE — 2500000002 HC RX 250 W HCPCS SELF ADMINISTERED DRUGS (ALT 637 FOR MEDICARE OP, ALT 636 FOR OP/ED): Performed by: ANESTHESIOLOGY

## 2024-03-28 PROCEDURE — 58150 TOTAL HYSTERECTOMY: CPT | Performed by: OBSTETRICS & GYNECOLOGY

## 2024-03-28 PROCEDURE — 3700000001 HC GENERAL ANESTHESIA TIME - INITIAL BASE CHARGE: Performed by: OBSTETRICS & GYNECOLOGY

## 2024-03-28 PROCEDURE — 2500000002 HC RX 250 W HCPCS SELF ADMINISTERED DRUGS (ALT 637 FOR MEDICARE OP, ALT 636 FOR OP/ED): Performed by: OBSTETRICS & GYNECOLOGY

## 2024-03-28 PROCEDURE — 3600000004 HC OR TIME - INITIAL BASE CHARGE - PROCEDURE LEVEL FOUR: Performed by: OBSTETRICS & GYNECOLOGY

## 2024-03-28 PROCEDURE — 7100000001 HC RECOVERY ROOM TIME - INITIAL BASE CHARGE: Performed by: OBSTETRICS & GYNECOLOGY

## 2024-03-28 PROCEDURE — 2720000007 HC OR 272 NO HCPCS: Performed by: OBSTETRICS & GYNECOLOGY

## 2024-03-28 PROCEDURE — 0UT70ZZ RESECTION OF BILATERAL FALLOPIAN TUBES, OPEN APPROACH: ICD-10-PCS | Performed by: OBSTETRICS & GYNECOLOGY

## 2024-03-28 PROCEDURE — 82947 ASSAY GLUCOSE BLOOD QUANT: CPT

## 2024-03-28 RX ORDER — MORPHINE SULFATE 1 MG/ML
INJECTION, SOLUTION EPIDURAL; INTRATHECAL; INTRAVENOUS AS NEEDED
Status: DISCONTINUED | OUTPATIENT
Start: 2024-03-28 | End: 2024-03-28

## 2024-03-28 RX ORDER — NALOXONE HYDROCHLORIDE 0.4 MG/ML
0.1 INJECTION, SOLUTION INTRAMUSCULAR; INTRAVENOUS; SUBCUTANEOUS EVERY 5 MIN PRN
Status: DISCONTINUED | OUTPATIENT
Start: 2024-03-28 | End: 2024-03-30 | Stop reason: HOSPADM

## 2024-03-28 RX ORDER — FLUTICASONE PROPIONATE 50 MCG
2 SPRAY, SUSPENSION (ML) NASAL DAILY
Status: DISCONTINUED | OUTPATIENT
Start: 2024-03-28 | End: 2024-03-30 | Stop reason: HOSPADM

## 2024-03-28 RX ORDER — SODIUM CHLORIDE, SODIUM LACTATE, POTASSIUM CHLORIDE, CALCIUM CHLORIDE 600; 310; 30; 20 MG/100ML; MG/100ML; MG/100ML; MG/100ML
50 INJECTION, SOLUTION INTRAVENOUS CONTINUOUS
Status: DISCONTINUED | OUTPATIENT
Start: 2024-03-28 | End: 2024-03-30 | Stop reason: HOSPADM

## 2024-03-28 RX ORDER — MORPHINE SULFATE 4 MG/ML
4 INJECTION, SOLUTION INTRAMUSCULAR; INTRAVENOUS EVERY 5 MIN PRN
Status: DISCONTINUED | OUTPATIENT
Start: 2024-03-28 | End: 2024-03-28

## 2024-03-28 RX ORDER — ACETAMINOPHEN 325 MG/1
975 TABLET ORAL ONCE
Status: CANCELLED | OUTPATIENT
Start: 2024-03-28 | End: 2024-03-28

## 2024-03-28 RX ORDER — POLYETHYLENE GLYCOL 3350 17 G/17G
17 POWDER, FOR SOLUTION ORAL DAILY
Status: DISCONTINUED | OUTPATIENT
Start: 2024-03-28 | End: 2024-03-30 | Stop reason: HOSPADM

## 2024-03-28 RX ORDER — ENOXAPARIN SODIUM 100 MG/ML
40 INJECTION SUBCUTANEOUS EVERY 24 HOURS
Status: DISCONTINUED | OUTPATIENT
Start: 2024-03-28 | End: 2024-03-30 | Stop reason: HOSPADM

## 2024-03-28 RX ORDER — ATORVASTATIN CALCIUM 40 MG/1
40 TABLET, FILM COATED ORAL DAILY
Status: DISCONTINUED | OUTPATIENT
Start: 2024-03-29 | End: 2024-03-30 | Stop reason: HOSPADM

## 2024-03-28 RX ORDER — ACETAMINOPHEN 325 MG/1
975 TABLET ORAL EVERY 6 HOURS
Status: DISCONTINUED | OUTPATIENT
Start: 2024-03-28 | End: 2024-03-30 | Stop reason: HOSPADM

## 2024-03-28 RX ORDER — SODIUM CHLORIDE, SODIUM LACTATE, POTASSIUM CHLORIDE, CALCIUM CHLORIDE 600; 310; 30; 20 MG/100ML; MG/100ML; MG/100ML; MG/100ML
40 INJECTION, SOLUTION INTRAVENOUS CONTINUOUS
Status: DISCONTINUED | OUTPATIENT
Start: 2024-03-28 | End: 2024-03-30 | Stop reason: HOSPADM

## 2024-03-28 RX ORDER — ACETAMINOPHEN 325 MG/1
975 TABLET ORAL ONCE
Status: COMPLETED | OUTPATIENT
Start: 2024-03-28 | End: 2024-03-28

## 2024-03-28 RX ORDER — METFORMIN HYDROCHLORIDE 500 MG/1
1000 TABLET, EXTENDED RELEASE ORAL
Status: DISCONTINUED | OUTPATIENT
Start: 2024-03-28 | End: 2024-03-28 | Stop reason: CLARIF

## 2024-03-28 RX ORDER — METFORMIN HYDROCHLORIDE 500 MG/1
1000 TABLET ORAL
Status: DISCONTINUED | OUTPATIENT
Start: 2024-03-28 | End: 2024-03-30 | Stop reason: HOSPADM

## 2024-03-28 RX ORDER — CEFAZOLIN SODIUM/D5W 2 G/100 ML
2 PLASTIC BAG, INJECTION (ML) INTRAVENOUS EVERY 8 HOURS
Status: DISCONTINUED | OUTPATIENT
Start: 2024-03-28 | End: 2024-03-28 | Stop reason: CLARIF

## 2024-03-28 RX ORDER — LOSARTAN POTASSIUM 25 MG/1
25 TABLET ORAL DAILY
Status: DISCONTINUED | OUTPATIENT
Start: 2024-03-28 | End: 2024-03-30 | Stop reason: HOSPADM

## 2024-03-28 RX ORDER — CEFAZOLIN SODIUM 2 G/100ML
INJECTION, SOLUTION INTRAVENOUS AS NEEDED
Status: DISCONTINUED | OUTPATIENT
Start: 2024-03-28 | End: 2024-03-28

## 2024-03-28 RX ORDER — ROCURONIUM BROMIDE 10 MG/ML
INJECTION, SOLUTION INTRAVENOUS AS NEEDED
Status: DISCONTINUED | OUTPATIENT
Start: 2024-03-28 | End: 2024-03-28

## 2024-03-28 RX ORDER — CEFAZOLIN SODIUM 2 G/100ML
2 INJECTION, SOLUTION INTRAVENOUS ONCE
Status: COMPLETED | OUTPATIENT
Start: 2024-03-28 | End: 2024-03-28

## 2024-03-28 RX ORDER — OXYCODONE HYDROCHLORIDE 5 MG/1
5 TABLET ORAL EVERY 4 HOURS PRN
Status: DISCONTINUED | OUTPATIENT
Start: 2024-03-28 | End: 2024-03-30 | Stop reason: HOSPADM

## 2024-03-28 RX ORDER — SIMETHICONE 80 MG
80 TABLET,CHEWABLE ORAL 4 TIMES DAILY PRN
Status: DISCONTINUED | OUTPATIENT
Start: 2024-03-28 | End: 2024-03-30 | Stop reason: HOSPADM

## 2024-03-28 RX ORDER — PHENYLEPHRINE 10 MG/250 ML(40 MCG/ML)IN 0.9 % SOD.CHLORIDE INTRAVENOUS
CONTINUOUS PRN
Status: DISCONTINUED | OUTPATIENT
Start: 2024-03-28 | End: 2024-03-28

## 2024-03-28 RX ORDER — LABETALOL HYDROCHLORIDE 5 MG/ML
5 INJECTION, SOLUTION INTRAVENOUS ONCE AS NEEDED
Status: DISCONTINUED | OUTPATIENT
Start: 2024-03-28 | End: 2024-03-28

## 2024-03-28 RX ORDER — NEOSTIGMINE METHYLSULFATE 1 MG/ML
INJECTION, SOLUTION INTRAVENOUS AS NEEDED
Status: DISCONTINUED | OUTPATIENT
Start: 2024-03-28 | End: 2024-03-28

## 2024-03-28 RX ORDER — SODIUM CHLORIDE, SODIUM LACTATE, POTASSIUM CHLORIDE, CALCIUM CHLORIDE 600; 310; 30; 20 MG/100ML; MG/100ML; MG/100ML; MG/100ML
100 INJECTION, SOLUTION INTRAVENOUS CONTINUOUS
Status: DISCONTINUED | OUTPATIENT
Start: 2024-03-28 | End: 2024-03-28 | Stop reason: HOSPADM

## 2024-03-28 RX ORDER — OXYCODONE HYDROCHLORIDE 5 MG/1
5 TABLET ORAL EVERY 4 HOURS PRN
Status: DISCONTINUED | OUTPATIENT
Start: 2024-03-28 | End: 2024-03-28

## 2024-03-28 RX ORDER — GABAPENTIN 300 MG/1
600 CAPSULE ORAL ONCE
Status: COMPLETED | OUTPATIENT
Start: 2024-03-28 | End: 2024-03-28

## 2024-03-28 RX ORDER — ONDANSETRON HYDROCHLORIDE 2 MG/ML
4 INJECTION, SOLUTION INTRAVENOUS ONCE AS NEEDED
Status: DISCONTINUED | OUTPATIENT
Start: 2024-03-28 | End: 2024-03-28

## 2024-03-28 RX ORDER — MIDAZOLAM HYDROCHLORIDE 1 MG/ML
INJECTION INTRAMUSCULAR; INTRAVENOUS AS NEEDED
Status: DISCONTINUED | OUTPATIENT
Start: 2024-03-28 | End: 2024-03-28

## 2024-03-28 RX ORDER — CEFAZOLIN SODIUM 2 G/100ML
2 INJECTION, SOLUTION INTRAVENOUS EVERY 8 HOURS
Status: COMPLETED | OUTPATIENT
Start: 2024-03-28 | End: 2024-03-29

## 2024-03-28 RX ORDER — DEXAMETHASONE SODIUM PHOSPHATE 10 MG/ML
INJECTION INTRAMUSCULAR; INTRAVENOUS AS NEEDED
Status: DISCONTINUED | OUTPATIENT
Start: 2024-03-28 | End: 2024-03-28

## 2024-03-28 RX ORDER — GLYCOPYRROLATE 0.2 MG/ML
INJECTION INTRAMUSCULAR; INTRAVENOUS AS NEEDED
Status: DISCONTINUED | OUTPATIENT
Start: 2024-03-28 | End: 2024-03-28

## 2024-03-28 RX ORDER — PROPOFOL 10 MG/ML
INJECTION, EMULSION INTRAVENOUS CONTINUOUS PRN
Status: DISCONTINUED | OUTPATIENT
Start: 2024-03-28 | End: 2024-03-28

## 2024-03-28 RX ORDER — ONDANSETRON HYDROCHLORIDE 2 MG/ML
4 INJECTION, SOLUTION INTRAVENOUS EVERY 6 HOURS PRN
Status: DISCONTINUED | OUTPATIENT
Start: 2024-03-28 | End: 2024-03-30 | Stop reason: HOSPADM

## 2024-03-28 RX ORDER — ONDANSETRON HYDROCHLORIDE 2 MG/ML
4 INJECTION, SOLUTION INTRAVENOUS ONCE
Status: COMPLETED | OUTPATIENT
Start: 2024-03-28 | End: 2024-03-28

## 2024-03-28 RX ORDER — CELECOXIB 200 MG/1
400 CAPSULE ORAL ONCE
Status: COMPLETED | OUTPATIENT
Start: 2024-03-28 | End: 2024-03-28

## 2024-03-28 RX ORDER — PROPOFOL 10 MG/ML
INJECTION, EMULSION INTRAVENOUS AS NEEDED
Status: DISCONTINUED | OUTPATIENT
Start: 2024-03-28 | End: 2024-03-28

## 2024-03-28 RX ORDER — MONTELUKAST SODIUM 10 MG/1
10 TABLET ORAL DAILY
Status: DISCONTINUED | OUTPATIENT
Start: 2024-03-28 | End: 2024-03-30 | Stop reason: HOSPADM

## 2024-03-28 RX ORDER — PROMETHAZINE HYDROCHLORIDE 25 MG/ML
INJECTION, SOLUTION INTRAMUSCULAR; INTRAVENOUS AS NEEDED
Status: DISCONTINUED | OUTPATIENT
Start: 2024-03-28 | End: 2024-03-28

## 2024-03-28 RX ORDER — GLIMEPIRIDE 2 MG/1
1 TABLET ORAL DAILY
Status: DISCONTINUED | OUTPATIENT
Start: 2024-03-28 | End: 2024-03-30 | Stop reason: HOSPADM

## 2024-03-28 RX ORDER — BUPIVACAINE HYDROCHLORIDE 7.5 MG/ML
INJECTION, SOLUTION EPIDURAL; RETROBULBAR AS NEEDED
Status: DISCONTINUED | OUTPATIENT
Start: 2024-03-28 | End: 2024-03-28

## 2024-03-28 RX ORDER — MORPHINE SULFATE 4 MG/ML
2 INJECTION, SOLUTION INTRAMUSCULAR; INTRAVENOUS EVERY 5 MIN PRN
Status: DISCONTINUED | OUTPATIENT
Start: 2024-03-28 | End: 2024-03-28

## 2024-03-28 RX ADMIN — ONDANSETRON 4 MG: 2 INJECTION INTRAMUSCULAR; INTRAVENOUS at 07:16

## 2024-03-28 RX ADMIN — MORPHINE SULFATE 0.15 MG: 1 INJECTION EPIDURAL; INTRATHECAL; INTRAVENOUS at 07:32

## 2024-03-28 RX ADMIN — ACETAMINOPHEN 975 MG: 325 TABLET ORAL at 14:46

## 2024-03-28 RX ADMIN — ROCURONIUM BROMIDE 10 MG: 10 INJECTION, SOLUTION INTRAVENOUS at 09:37

## 2024-03-28 RX ADMIN — MORPHINE SULFATE 4.85 MG: 1 INJECTION, SOLUTION EPIDURAL; INTRATHECAL; INTRAVENOUS at 11:18

## 2024-03-28 RX ADMIN — CEFAZOLIN SODIUM 2 G: 2 INJECTION, SOLUTION INTRAVENOUS at 11:08

## 2024-03-28 RX ADMIN — ROCURONIUM BROMIDE 10 MG: 10 INJECTION, SOLUTION INTRAVENOUS at 08:58

## 2024-03-28 RX ADMIN — SODIUM CHLORIDE, POTASSIUM CHLORIDE, SODIUM LACTATE AND CALCIUM CHLORIDE 50 ML/HR: 600; 310; 30; 20 INJECTION, SOLUTION INTRAVENOUS at 07:15

## 2024-03-28 RX ADMIN — MORPHINE SULFATE 5 MG: 1 INJECTION, SOLUTION EPIDURAL; INTRATHECAL; INTRAVENOUS at 11:13

## 2024-03-28 RX ADMIN — CELECOXIB 400 MG: 200 CAPSULE ORAL at 06:55

## 2024-03-28 RX ADMIN — SODIUM CHLORIDE, POTASSIUM CHLORIDE, SODIUM LACTATE AND CALCIUM CHLORIDE 100 ML/HR: 600; 310; 30; 20 INJECTION, SOLUTION INTRAVENOUS at 12:56

## 2024-03-28 RX ADMIN — MONTELUKAST 10 MG: 10 TABLET, FILM COATED ORAL at 14:46

## 2024-03-28 RX ADMIN — MIDAZOLAM HYDROCHLORIDE 4 MG: 1 INJECTION, SOLUTION INTRAMUSCULAR; INTRAVENOUS at 07:32

## 2024-03-28 RX ADMIN — PROPOFOL 100 MCG/KG/MIN: 10 INJECTION, EMULSION INTRAVENOUS at 07:36

## 2024-03-28 RX ADMIN — ROCURONIUM BROMIDE 10 MG: 10 INJECTION, SOLUTION INTRAVENOUS at 08:08

## 2024-03-28 RX ADMIN — INSULIN HUMAN 1 UNITS: 100 INJECTION, SOLUTION PARENTERAL at 11:16

## 2024-03-28 RX ADMIN — OXYCODONE HYDROCHLORIDE 5 MG: 5 TABLET ORAL at 21:24

## 2024-03-28 RX ADMIN — Medication 10 MG: at 08:31

## 2024-03-28 RX ADMIN — Medication 0.4 MCG/KG/MIN: at 07:32

## 2024-03-28 RX ADMIN — GLIMEPIRIDE 1 MG: 2 TABLET ORAL at 14:46

## 2024-03-28 RX ADMIN — INSULIN HUMAN 2 UNITS: 100 INJECTION, SOLUTION PARENTERAL at 08:28

## 2024-03-28 RX ADMIN — ROCURONIUM BROMIDE 40 MG: 10 INJECTION, SOLUTION INTRAVENOUS at 07:36

## 2024-03-28 RX ADMIN — GLYCOPYRROLATE 0.4 MG: 0.2 INJECTION, SOLUTION INTRAMUSCULAR; INTRAVENOUS at 11:36

## 2024-03-28 RX ADMIN — HYDROMORPHONE HYDROCHLORIDE 0.4 MG: 1 INJECTION, SOLUTION INTRAMUSCULAR; INTRAVENOUS; SUBCUTANEOUS at 23:40

## 2024-03-28 RX ADMIN — PROPOFOL 200 MG: 10 INJECTION, EMULSION INTRAVENOUS at 07:36

## 2024-03-28 RX ADMIN — CEFAZOLIN SODIUM 2 G: 2 INJECTION, SOLUTION INTRAVENOUS at 07:08

## 2024-03-28 RX ADMIN — NEOSTIGMINE METHYLSULFATE 2 MG: 1 INJECTION, SOLUTION INTRAVENOUS at 11:36

## 2024-03-28 RX ADMIN — MORPHINE SULFATE 2 MG: 4 INJECTION, SOLUTION INTRAMUSCULAR; INTRAVENOUS at 13:10

## 2024-03-28 RX ADMIN — Medication 10 MG: at 09:35

## 2024-03-28 RX ADMIN — METFORMIN HYDROCHLORIDE 1000 MG: 500 TABLET ORAL at 17:00

## 2024-03-28 RX ADMIN — Medication 30 MG: at 07:36

## 2024-03-28 RX ADMIN — BUPIVACAINE HYDROCHLORIDE 1.8 ML: 7.5 INJECTION, SOLUTION EPIDURAL; RETROBULBAR at 07:32

## 2024-03-28 RX ADMIN — ACETAMINOPHEN 975 MG: 325 TABLET ORAL at 20:00

## 2024-03-28 RX ADMIN — PROMETHAZINE HYDROCHLORIDE 6.25 MG: 25 INJECTION INTRAMUSCULAR; INTRAVENOUS at 08:43

## 2024-03-28 RX ADMIN — GABAPENTIN 600 MG: 300 CAPSULE ORAL at 06:55

## 2024-03-28 RX ADMIN — ACETAMINOPHEN 975 MG: 325 TABLET ORAL at 06:55

## 2024-03-28 RX ADMIN — CEFAZOLIN SODIUM 2 G: 2 INJECTION, SOLUTION INTRAVENOUS at 17:00

## 2024-03-28 RX ADMIN — DEXAMETHASONE SODIUM PHOSPHATE 8 MG: 10 INJECTION, SOLUTION INTRAMUSCULAR; INTRAVENOUS at 07:59

## 2024-03-28 SDOH — SOCIAL STABILITY: SOCIAL INSECURITY: ABUSE: ADULT

## 2024-03-28 SDOH — SOCIAL STABILITY: SOCIAL INSECURITY: DO YOU FEEL UNSAFE GOING BACK TO THE PLACE WHERE YOU ARE LIVING?: NO

## 2024-03-28 SDOH — SOCIAL STABILITY: SOCIAL INSECURITY: HAVE YOU HAD THOUGHTS OF HARMING ANYONE ELSE?: NO

## 2024-03-28 SDOH — SOCIAL STABILITY: SOCIAL INSECURITY: ARE THERE ANY APPARENT SIGNS OF INJURIES/BEHAVIORS THAT COULD BE RELATED TO ABUSE/NEGLECT?: NO

## 2024-03-28 SDOH — SOCIAL STABILITY: SOCIAL INSECURITY: ARE YOU OR HAVE YOU BEEN THREATENED OR ABUSED PHYSICALLY, EMOTIONALLY, OR SEXUALLY BY ANYONE?: NO

## 2024-03-28 SDOH — SOCIAL STABILITY: SOCIAL INSECURITY: DOES ANYONE TRY TO KEEP YOU FROM HAVING/CONTACTING OTHER FRIENDS OR DOING THINGS OUTSIDE YOUR HOME?: NO

## 2024-03-28 SDOH — SOCIAL STABILITY: SOCIAL INSECURITY: HAS ANYONE EVER THREATENED TO HURT YOUR FAMILY OR YOUR PETS?: NO

## 2024-03-28 SDOH — SOCIAL STABILITY: SOCIAL INSECURITY: WERE YOU ABLE TO COMPLETE ALL THE BEHAVIORAL HEALTH SCREENINGS?: YES

## 2024-03-28 SDOH — SOCIAL STABILITY: SOCIAL INSECURITY: DO YOU FEEL ANYONE HAS EXPLOITED OR TAKEN ADVANTAGE OF YOU FINANCIALLY OR OF YOUR PERSONAL PROPERTY?: NO

## 2024-03-28 ASSESSMENT — ACTIVITIES OF DAILY LIVING (ADL)
LACK_OF_TRANSPORTATION: NO
HEARING - RIGHT EAR: FUNCTIONAL
JUDGMENT_ADEQUATE_SAFELY_COMPLETE_DAILY_ACTIVITIES: YES
PATIENT'S MEMORY ADEQUATE TO SAFELY COMPLETE DAILY ACTIVITIES?: YES
WALKS IN HOME: INDEPENDENT
BATHING: INDEPENDENT
FEEDING YOURSELF: INDEPENDENT
DRESSING YOURSELF: INDEPENDENT
TOILETING: INDEPENDENT
ADEQUATE_TO_COMPLETE_ADL: YES
GROOMING: INDEPENDENT
HEARING - LEFT EAR: FUNCTIONAL

## 2024-03-28 ASSESSMENT — PAIN SCALES - GENERAL
PAINLEVEL_OUTOF10: 7
PAINLEVEL_OUTOF10: 3
PAINLEVEL_OUTOF10: 3
PAINLEVEL_OUTOF10: 5 - MODERATE PAIN
PAINLEVEL_OUTOF10: 0 - NO PAIN
PAINLEVEL_OUTOF10: 3
PAINLEVEL_OUTOF10: 0 - NO PAIN
PAINLEVEL_OUTOF10: 0 - NO PAIN
PAINLEVEL_OUTOF10: 5 - MODERATE PAIN
PAINLEVEL_OUTOF10: 6
PAINLEVEL_OUTOF10: 0 - NO PAIN
PAINLEVEL_OUTOF10: 1
PAINLEVEL_OUTOF10: 3

## 2024-03-28 ASSESSMENT — COGNITIVE AND FUNCTIONAL STATUS - GENERAL
WALKING IN HOSPITAL ROOM: A LITTLE
DAILY ACTIVITIY SCORE: 24
STANDING UP FROM CHAIR USING ARMS: A LITTLE
MOBILITY SCORE: 24
CLIMB 3 TO 5 STEPS WITH RAILING: A LITTLE
PATIENT BASELINE BEDBOUND: NO
MOBILITY SCORE: 21
DAILY ACTIVITIY SCORE: 24

## 2024-03-28 ASSESSMENT — PAIN DESCRIPTION - DESCRIPTORS
DESCRIPTORS: ACHING;SHARP;SHOOTING
DESCRIPTORS: ACHING;SORE;SHARP
DESCRIPTORS: ACHING;SORE;SHARP

## 2024-03-28 ASSESSMENT — LIFESTYLE VARIABLES
HOW OFTEN DO YOU HAVE A DRINK CONTAINING ALCOHOL: NEVER
HOW MANY STANDARD DRINKS CONTAINING ALCOHOL DO YOU HAVE ON A TYPICAL DAY: PATIENT DOES NOT DRINK
HOW OFTEN DO YOU HAVE 6 OR MORE DRINKS ON ONE OCCASION: NEVER
AUDIT-C TOTAL SCORE: 0
SKIP TO QUESTIONS 9-10: 1
AUDIT-C TOTAL SCORE: 0

## 2024-03-28 ASSESSMENT — PAIN - FUNCTIONAL ASSESSMENT
PAIN_FUNCTIONAL_ASSESSMENT: 0-10

## 2024-03-28 ASSESSMENT — PAIN DESCRIPTION - LOCATION
LOCATION: ABDOMEN

## 2024-03-28 ASSESSMENT — PAIN DESCRIPTION - ORIENTATION
ORIENTATION: LOWER;MID
ORIENTATION: LOWER;MID

## 2024-03-28 ASSESSMENT — PATIENT HEALTH QUESTIONNAIRE - PHQ9
1. LITTLE INTEREST OR PLEASURE IN DOING THINGS: NOT AT ALL
2. FEELING DOWN, DEPRESSED OR HOPELESS: NOT AT ALL
SUM OF ALL RESPONSES TO PHQ9 QUESTIONS 1 & 2: 0

## 2024-03-28 ASSESSMENT — COLUMBIA-SUICIDE SEVERITY RATING SCALE - C-SSRS
1. IN THE PAST MONTH, HAVE YOU WISHED YOU WERE DEAD OR WISHED YOU COULD GO TO SLEEP AND NOT WAKE UP?: NO
2. HAVE YOU ACTUALLY HAD ANY THOUGHTS OF KILLING YOURSELF?: NO
6. HAVE YOU EVER DONE ANYTHING, STARTED TO DO ANYTHING, OR PREPARED TO DO ANYTHING TO END YOUR LIFE?: NO

## 2024-03-28 NOTE — ANESTHESIA POSTPROCEDURE EVALUATION
Patient: Nubia Martinez    Procedure Summary       Date: 03/28/24 Room / Location: [facility] OR 03 / [facility]    Anesthesia Start: 0725 Anesthesia Stop: 1152    Procedure: Total abdominal hysterectomy, bilateral salpingoophorectomy (Abdomen) Diagnosis:       Menometrorrhagia      Uterine leiomyoma, unspecified location      (Menometrorrhagia [N92.1])      (Uterine leiomyoma, unspecified location [D25.9])    Surgeons: Alf Guillen MD Responsible Provider: Zen Bloom MD    Anesthesia Type: general ASA Status: 2            Anesthesia Type: general    Vitals Value Taken Time   /73 03/28/24 1215   Temp 37.2 °C (98.9 °F) 03/28/24 1155   Pulse 100 03/28/24 1215   Resp 15 03/28/24 1215   SpO2 98 % 03/28/24 1215       Anesthesia Post Evaluation    Patient location during evaluation: bedside  Patient participation: complete - patient participated  Level of consciousness: sleepy but conscious  Pain management: adequate  Airway patency: patent  Cardiovascular status: acceptable  Respiratory status: acceptable  Hydration status: acceptable  Postoperative Nausea and Vomiting: none    No notable events documented.

## 2024-03-28 NOTE — ANESTHESIA PREPROCEDURE EVALUATION
Patient: Nubia Martinez    Procedure Information       Anesthesia Start Date/Time: 03/28/24 0725    Procedure: Total abdominal hysterectomy, bilateral salpingoophorectomy    Location: [facility] OR 03 / [facility] OR    Surgeons: Alf Guillen MD            Relevant Problems   Cardiac   (+) Hyperlipidemia      GI   (+) Hiatal hernia with GERD      Endocrine   (+) Type 2 diabetes mellitus with hyperglycemia (CMS/HCC)      HEENT   (+) Seasonal allergies      GYN   (+) Fibroid uterus   (+) Menometrorrhagia   (+) Uterine leiomyoma       Clinical information reviewed:   Tobacco  Allergies  Meds   Med Hx  Surg Hx  OB Status  Fam Hx  Soc   Hx        NPO/Void Status  Carbohydrate Drink Given Prior to Surgery? : Y  Date of Last Liquid: 03/28/24  Time of Last Liquid: 0500 (carbohydrate drink)  Date of Last Solid: 03/27/24  Time of Last Solid: 2230  Time of Last Void: 0615         Physical Exam    Airway  Mallampati: II  TM distance: >3 FB  Neck ROM: full     Cardiovascular   Rhythm: regular  Rate: normal     Dental    Pulmonary    Abdominal            Anesthesia Plan    History of general anesthesia?: yes  History of complications of general anesthesia?: no    ASA 2     general and spinal     intravenous induction   Anesthetic plan and risks discussed with patient.         Anesthesia Evaluation      Airway   Mallampati: II  TM distance: >3 FB  Neck ROM: full  Dental      Pulmonary - negative ROS   Cardiovascular   (+) hypertension    Rhythm: regular  Rate: normal    Neuro/Psych - negative ROS     GI/Hepatic/Renal    (+) hiatal hernia, GERD    Endo/Other    (+) diabetes mellitus  Abdominal

## 2024-03-28 NOTE — OP NOTE
Total abdominal hysterectomy, bilateral salpingoophorectomy Operative Note     Date: 3/28/2024  OR Location: [facility] OR    Name: Nubia Martinez, : 1975, Age: 49 y.o., MRN: [id number], Sex: female    Diagnosis  Pre-op Diagnosis     * Menometrorrhagia [N92.1]     * Uterine leiomyoma, unspecified location [D25.9] Post-op Diagnosis     * Menometrorrhagia [N92.1]     * Uterine leiomyoma, unspecified location [D25.9]     Procedures  Total abdominal hysterectomy, bilateral salpingoophorectomy  55488 - VT TOTAL ABDOMINAL HYSTERECT W/WO RMVL TUBE OVARY      Surgeons      * Alf Guillen - Primary    Resident/Fellow/Other Assistant:  Surgeon(s) and Role: Josue Pastor    Procedure Summary  Anesthesia: General  ASA: II  Anesthesia Staff: Anesthesiologist: Zen Bloom MD  Estimated Blood Loss: 200mL  Intra-op Medications: Administrations occurring from 0730 to 1050 on 24:  * No intraprocedure medications in log *           Anesthesia Record               Intraprocedure I/O Totals          Intake    Neostigmine 0.00 mL    The total shown is the total volume documented since Anesthesia Start was filed.    Propofol Drip 0.00 mL    The total shown is the total volume documented since Anesthesia Start was filed.    Phenylephrine Drip 0.00 mL    The total shown is the total volume documented since Anesthesia Start was filed.    Total Intake 0 mL       Output    Urine 350 mL    Total Blood Loss - Surgical Delivery (mL) 75 mL    Total Output 425 mL       Net    Net Volume -425 mL       Other (could not be determined as input or output)    Surgical Delivery Estimated Blood Loss (mL) 75          Specimen:   ID Type Source Tests Collected by Time   1 : UTERUS, CERVIX, FALLOPIAN TUBES AND OVARIES BILATERAL Tissue UTERUS, CERVIX, FALLOPIAN TUBES AND OVARIES BILATERAL SURGICAL PATHOLOGY EXAM Emre Hassan RN 3/28/2024 0840        Staff:   Circulator: Emre Hassan RN  Relief Circulator: Melanie Medina RN  Scrub Person:  Julissa Simmons, RN; Vin Pastor, RN         Drains and/or Catheters:   Urethral Catheter Non-latex 16 Fr. (Active)       Tourniquet Times:         Implants:     Findings: Enlarged uterus secondary to uterine fibroids.  Both ovaries and tubes appeared normal.  Anterior and posterior cul-de-sacs appeared normal no evidence of adhesions or endometriosis.    Indications: Nubia Martinez is an 49 y.o. female who is having surgery for Menometrorrhagia [N92.1]  Uterine leiomyoma, unspecified location [D25.9].     The patient was seen in the preoperative area. The risks, benefits, complications, treatment options, non-operative alternatives, expected recovery and outcomes were discussed with the patient. The possibilities of reaction to medication, pulmonary aspiration, injury to surrounding structures, bleeding, recurrent infection, the need for additional procedures, failure to diagnose a condition, and creating a complication requiring transfusion or operation were discussed with the patient. The patient concurred with the proposed plan, giving informed consent.  The site of surgery was properly noted/marked if necessary per policy. The patient has been actively warmed in preoperative area. Preoperative antibiotics given within 1 hour of surgery.  Venous thrombosis prophylaxis have been ordered including bilateral sequential compression devices    Procedure Details:   Procedure:  Patient received Ancef 2 g IV preoperatively. She had sequential compression devices placed on the lower extremities preoperatively. She was brought into the operating room and placed in supine position.  Patient had a spinal anesthetic placed.  After an adequate level of general anesthesia, the abdomen, perineum and vagina were prepped and draped in usual sterile fashion.  Perdomo catheter was placed into the urinary bladder with a Perdomo bag for continuous drainage.    The incision was made approximately 1.5 centimeters above the old Pfannenstiel  skin scar and carried down through the fat and fascia. Fascia was incised transversely. Peritoneum was identified and entered sharply and atraumatically, and extended in a vertical fashion. The O'Pasquale-O'Dowell retractor was then placed and the bowel was packed with lap sponges. Peritoneum was inspected revealing enlarged uterus secondary to uterine fibroids and both ovaries appeared normal. The round ligament was doubly ligated and transected with 0 Vicryl. This was carried out bilaterally. The left broad ligament was identified and an avascular window was made through the broad ligament. The left infundibulopelvic ligament was isolated and doubly clamped, cut and suture ligated with 0 Vicryl in a figure-of-eight fashion followed by a tie of 0 Vicryl. The same procedure was carried out on the right infundibulopelvic ligament. The bladder flap was developed with sharp and blunt dissection. The parametrial tissues were clamped, cut, and suture ligated with 0 Vicryl in a Chencho fashion and this was carried out bilaterally down just below the fundus of the uterus.  For better visualization, the fundus of the uterus was then amputated with cautery and the uterus and both ovaries and tubes were taken off the field.  Then additional parametrial tissues were clamped cut and suture-ligated with 0 Vicryl in a Chencho fashion down to approximately 1-2 cm from the uterosacral ligaments. The reflection of the cervix and vagina was identified and then the vagina was entered sharply. The cervix was then circumscribed from the vagina sharply and taken off the field. Betadine solution was poured over the vaginal cuff. Then the right and left angles of the vaginal cuff were suture ligated with 0 Vicryl. Then the vaginal cuff was closed using 0 Monocryl in a running interlocking fashion. Pelvis was thoroughly lavaged with warm normal saline.  Several pinpoint areas along the vaginal cuff were made hemostatic by figure-of-eight of 0  Vicryl.  Hemostasis was good.  Patient received a second dose of Ancef 2 g IV since the case was approximately 3 hours at this point.    The lap sponges and O'Pasquale-O'Dowell retractor were removed. Peritoneum is closed using 2-0 Vicryl in a running fashion. Fascia was closed using 0 PDS in a running fashion. Subcutaneous tissue layer was thoroughly lavaged with warm normal saline and hemostasis was obtained with cautery.  Epicutaneous layer was closed using 2-0 Vicryl in an interrupted fashion.  Then the skin was closed using staples. Estimated blood loss was 200 mL. Sponge and needle counts were correct x2 and patient tolerated procedure well.    Patient was taken to PACU in good condition. The cervix, uterus and both ovaries and tubes sent to pathology for further analysis.    Josue Pastor participated throughout the entirety of the case due to the complexity of the surgical case in order to complete the case.          Complications:  None; patient tolerated the procedure well.    Disposition: PACU - hemodynamically stable.  Condition: stable         Additional Details: none    Attending Attestation:     Alf Guillen  Phone Number: [phone]

## 2024-03-28 NOTE — ANESTHESIA PROCEDURE NOTES
Spinal Block    Start time: 3/28/2024 7:40 AM  End time: 3/28/2024 7:42 AM  Reason for block: post-op pain management  Staffing  Performed: attending   Authorized by: Zen Bloom MD    Performed by: Zen Bloom MD    Preanesthetic Checklist  Completed: patient identified, IV checked, risks and benefits discussed, surgical consent, pre-op evaluation, timeout performed and sterile techniques followed  Block Timeout  RN/Licensed healthcare professional reads aloud to the Anesthesia provider and entire team: Patient identity, procedure with side and site, patient position, and as applicable the availability of implants/special equipment/special requirements.  Patient on coagulant treatment: no  Timeout performed at:   Spinal Block  Patient position: sitting  Prep: ChloraPrep  Sterility prep: cap, drape, gloves, hand hygiene and mask  Sedation level: moderate sedation  Patient monitoring: blood pressure and continuous pulse oximetry  Approach: midline  Vertebral space: L3-4  Injection technique: single-shot  Needle  Needle type: Karri   Needle gauge: 25 G  Free flowing CSF: yes    Assessment  Procedure assessment: patient sedated but conversant throughout procedure

## 2024-03-28 NOTE — ANESTHESIA PROCEDURE NOTES
Airway  Date/Time: 3/28/2024 7:36 AM  Urgency: elective      Staffing  Performed: attending   Authorized by: Zen Bloom MD    Performed by: Zen Bloom MD  Patient location during procedure: OR    Indications and Patient Condition  Preoxygenated: yes      Final Airway Details  Final airway type: endotracheal airway      Successful airway: ETT  Cuffed: yes   Successful intubation technique: video laryngoscopy  ETT size (mm): 7.0  Cormack-Lehane Classification: grade I - full view of glottis  Placement verified by: capnometry   Number of attempts at approach: 1

## 2024-03-29 ENCOUNTER — APPOINTMENT (OUTPATIENT)
Dept: PRIMARY CARE | Facility: CLINIC | Age: 49
End: 2024-03-29
Payer: COMMERCIAL

## 2024-03-29 LAB
ANION GAP SERPL CALC-SCNC: 12 MMOL/L (ref 10–20)
BUN SERPL-MCNC: 9 MG/DL (ref 6–23)
CALCIUM SERPL-MCNC: 8.6 MG/DL (ref 8.6–10.3)
CHLORIDE SERPL-SCNC: 105 MMOL/L (ref 98–107)
CO2 SERPL-SCNC: 24 MMOL/L (ref 21–32)
CREAT SERPL-MCNC: 0.81 MG/DL (ref 0.5–1.05)
EGFRCR SERPLBLD CKD-EPI 2021: 88 ML/MIN/1.73M*2
ERYTHROCYTE [DISTWIDTH] IN BLOOD BY AUTOMATED COUNT: 14.7 % (ref 11.5–14.5)
GLUCOSE SERPL-MCNC: 138 MG/DL (ref 74–99)
HCT VFR BLD AUTO: 29.5 % (ref 36–46)
HGB BLD-MCNC: 8.9 G/DL (ref 12–16)
MCH RBC QN AUTO: 24.9 PG (ref 26–34)
MCHC RBC AUTO-ENTMCNC: 30.2 G/DL (ref 32–36)
MCV RBC AUTO: 83 FL (ref 80–100)
NRBC BLD-RTO: 0 /100 WBCS (ref 0–0)
PLATELET # BLD AUTO: 286 X10*3/UL (ref 150–450)
POTASSIUM SERPL-SCNC: 4.2 MMOL/L (ref 3.5–5.3)
RBC # BLD AUTO: 3.57 X10*6/UL (ref 4–5.2)
SODIUM SERPL-SCNC: 137 MMOL/L (ref 136–145)
WBC # BLD AUTO: 13 X10*3/UL (ref 4.4–11.3)

## 2024-03-29 PROCEDURE — 94668 MNPJ CHEST WALL SBSQ: CPT

## 2024-03-29 PROCEDURE — 2500000002 HC RX 250 W HCPCS SELF ADMINISTERED DRUGS (ALT 637 FOR MEDICARE OP, ALT 636 FOR OP/ED): Performed by: OBSTETRICS & GYNECOLOGY

## 2024-03-29 PROCEDURE — 85027 COMPLETE CBC AUTOMATED: CPT | Performed by: OBSTETRICS & GYNECOLOGY

## 2024-03-29 PROCEDURE — 36415 COLL VENOUS BLD VENIPUNCTURE: CPT | Performed by: OBSTETRICS & GYNECOLOGY

## 2024-03-29 PROCEDURE — 80048 BASIC METABOLIC PNL TOTAL CA: CPT | Performed by: OBSTETRICS & GYNECOLOGY

## 2024-03-29 PROCEDURE — 1100000001 HC PRIVATE ROOM DAILY

## 2024-03-29 PROCEDURE — 2500000004 HC RX 250 GENERAL PHARMACY W/ HCPCS (ALT 636 FOR OP/ED): Performed by: OBSTETRICS & GYNECOLOGY

## 2024-03-29 PROCEDURE — 2500000001 HC RX 250 WO HCPCS SELF ADMINISTERED DRUGS (ALT 637 FOR MEDICARE OP): Performed by: OBSTETRICS & GYNECOLOGY

## 2024-03-29 RX ORDER — PANTOPRAZOLE SODIUM 40 MG/1
40 TABLET, DELAYED RELEASE ORAL
Status: DISCONTINUED | OUTPATIENT
Start: 2024-03-29 | End: 2024-03-30 | Stop reason: HOSPADM

## 2024-03-29 RX ORDER — IBUPROFEN 800 MG/1
800 TABLET ORAL EVERY 8 HOURS PRN
Status: DISCONTINUED | OUTPATIENT
Start: 2024-03-29 | End: 2024-03-30 | Stop reason: HOSPADM

## 2024-03-29 RX ADMIN — OXYCODONE HYDROCHLORIDE 5 MG: 5 TABLET ORAL at 11:21

## 2024-03-29 RX ADMIN — ACETAMINOPHEN 975 MG: 325 TABLET ORAL at 02:38

## 2024-03-29 RX ADMIN — ACETAMINOPHEN 975 MG: 325 TABLET ORAL at 20:29

## 2024-03-29 RX ADMIN — OXYCODONE HYDROCHLORIDE 5 MG: 5 TABLET ORAL at 22:43

## 2024-03-29 RX ADMIN — ACETAMINOPHEN 975 MG: 325 TABLET ORAL at 17:42

## 2024-03-29 RX ADMIN — METFORMIN HYDROCHLORIDE 1000 MG: 500 TABLET ORAL at 17:42

## 2024-03-29 RX ADMIN — HYDROMORPHONE HYDROCHLORIDE 0.4 MG: 1 INJECTION, SOLUTION INTRAMUSCULAR; INTRAVENOUS; SUBCUTANEOUS at 14:23

## 2024-03-29 RX ADMIN — ACETAMINOPHEN 975 MG: 325 TABLET ORAL at 08:51

## 2024-03-29 RX ADMIN — PANTOPRAZOLE SODIUM 40 MG: 40 TABLET, DELAYED RELEASE ORAL at 08:48

## 2024-03-29 RX ADMIN — LOSARTAN POTASSIUM 25 MG: 25 TABLET, FILM COATED ORAL at 08:49

## 2024-03-29 RX ADMIN — OXYCODONE HYDROCHLORIDE 5 MG: 5 TABLET ORAL at 02:38

## 2024-03-29 RX ADMIN — METFORMIN HYDROCHLORIDE 1000 MG: 500 TABLET ORAL at 08:50

## 2024-03-29 RX ADMIN — CEFAZOLIN SODIUM 2 G: 2 INJECTION, SOLUTION INTRAVENOUS at 11:21

## 2024-03-29 RX ADMIN — GLIMEPIRIDE 1 MG: 2 TABLET ORAL at 08:48

## 2024-03-29 RX ADMIN — CEFAZOLIN SODIUM 2 G: 2 INJECTION, SOLUTION INTRAVENOUS at 02:38

## 2024-03-29 RX ADMIN — IBUPROFEN 800 MG: 800 TABLET, FILM COATED ORAL at 22:28

## 2024-03-29 RX ADMIN — ATORVASTATIN CALCIUM 40 MG: 40 TABLET, FILM COATED ORAL at 08:57

## 2024-03-29 RX ADMIN — IBUPROFEN 800 MG: 800 TABLET, FILM COATED ORAL at 15:58

## 2024-03-29 RX ADMIN — POLYETHYLENE GLYCOL 3350 17 G: 17 POWDER, FOR SOLUTION ORAL at 08:53

## 2024-03-29 RX ADMIN — OXYCODONE HYDROCHLORIDE 5 MG: 5 TABLET ORAL at 18:36

## 2024-03-29 RX ADMIN — ENOXAPARIN SODIUM 40 MG: 40 INJECTION SUBCUTANEOUS at 14:25

## 2024-03-29 RX ADMIN — MONTELUKAST 10 MG: 10 TABLET, FILM COATED ORAL at 08:51

## 2024-03-29 ASSESSMENT — PAIN - FUNCTIONAL ASSESSMENT
PAIN_FUNCTIONAL_ASSESSMENT: 0-10

## 2024-03-29 ASSESSMENT — PAIN DESCRIPTION - LOCATION
LOCATION: ABDOMEN
LOCATION: ABDOMEN

## 2024-03-29 ASSESSMENT — PAIN SCALES - GENERAL
PAINLEVEL_OUTOF10: 5 - MODERATE PAIN
PAINLEVEL_OUTOF10: 5 - MODERATE PAIN
PAINLEVEL_OUTOF10: 6
PAINLEVEL_OUTOF10: 7
PAINLEVEL_OUTOF10: 5 - MODERATE PAIN
PAINLEVEL_OUTOF10: 3
PAINLEVEL_OUTOF10: 6
PAINLEVEL_OUTOF10: 4
PAINLEVEL_OUTOF10: 4
PAINLEVEL_OUTOF10: 2

## 2024-03-29 ASSESSMENT — ACTIVITIES OF DAILY LIVING (ADL): LACK_OF_TRANSPORTATION: NO

## 2024-03-29 ASSESSMENT — PAIN DESCRIPTION - DESCRIPTORS
DESCRIPTORS: ACHING;SHARP

## 2024-03-29 NOTE — CARE PLAN
The patient's goals for the shift include      The clinical goals for the shift include pain control, rest      Problem: Pain - Adult  Goal: Verbalizes/displays adequate comfort level or baseline comfort level  Outcome: Progressing     Problem: Safety - Adult  Goal: Free from fall injury  Outcome: Progressing     Problem: Discharge Planning  Goal: Discharge to home or other facility with appropriate resources  Outcome: Progressing     Problem: Chronic Conditions and Co-morbidities  Goal: Patient's chronic conditions and co-morbidity symptoms are monitored and maintained or improved  Outcome: Progressing     Problem: Diabetes  Goal: Achieve decreasing blood glucose levels by end of shift  Outcome: Progressing  Goal: Increase stability of blood glucose readings by end of shift  Outcome: Progressing  Goal: Maintain electrolyte levels within acceptable range throughout shift  Outcome: Progressing  Goal: Maintain glucose levels >70mg/dl to <250mg/dl throughout shift  Outcome: Progressing  Goal: No changes in neurological exam by end of shift  Outcome: Progressing  Goal: Vital signs within normal range for age by end of shift  Outcome: Progressing  Goal: Receive DSME education by end of shift  Outcome: Progressing     Problem: Fall/Injury  Goal: Not fall by end of shift  Outcome: Progressing  Goal: Be free from injury by end of the shift  Outcome: Progressing  Goal: Verbalize understanding of personal risk factors for fall in the hospital  Outcome: Progressing  Goal: Verbalize understanding of risk factor reduction measures to prevent injury from fall in the home  Outcome: Progressing  Goal: Use assistive devices by end of the shift  Outcome: Progressing  Goal: Pace activities to prevent fatigue by end of the shift  Outcome: Progressing

## 2024-03-29 NOTE — PROGRESS NOTES
03/29/24 1548   Discharge Planning   Living Arrangements Spouse/significant other   Support Systems Spouse/significant other;Parent;Children   Assistance Needed None   Type of Residence Private residence   Number of Stairs to Enter Residence 2   Number of Stairs Within Residence 0   Do you have animals or pets at home? No   Who is requesting discharge planning? Provider   Home or Post Acute Services None   Patient expects to be discharged to: Home   Financial Resource Strain   How hard is it for you to pay for the very basics like food, housing, medical care, and heating? Not hard   Housing Stability   In the last 12 months, was there a time when you were not able to pay the mortgage or rent on time? N   In the last 12 months, how many places have you lived? 1   In the last 12 months, was there a time when you did not have a steady place to sleep or slept in a shelter (including now)? N   Transportation Needs   In the past 12 months, has lack of transportation kept you from medical appointments or from getting medications? no   In the past 12 months, has lack of transportation kept you from meetings, work, or from getting things needed for daily living? No     Care Transitions: Patient reviewed in care round meeting this AM and is under the care of Dr. Guillen. Met with patient at bedside. Role of TCC explained. Demographics and contact verified. Lives in a one story with spouse and feels safe at home. PCP is Dr. Denney. Pharmacy of choice is "[facility] in [place] for medication needs. Denies any difficulty obtaining/affording medications. She is independent at home with all ADL's and drives. She is diabetic and wears a freestyle wayne sensor to monitor blood glucose 4-5 times daily. Denies the need for any diabetic supplies or equipment. Discussed discharge plans. States she will return home. Her  and mom will be assisting her at home with needs. Denies needing any in home services at this time. Care team to  follow. Melissa Luz RN/TCC

## 2024-03-29 NOTE — PROGRESS NOTES
"Nubia Martinez is a 49 y.o. female on day 1 of admission presenting with Fibroid uterus.    Subjective   Patient is resting well.  Voiding well.  Passing some flatus.  She has not been out of bed much since surgery.       Objective     Physical Exam  Well-developed, well nourished, in no acute distress, alert and oriented x three, is pleasant and cooperative.  HEENT: Clear. Pupils equal, round and reactive to light and accommodation. Extraocular muscles are intact. Oral mucosa pink without exudate.   NECK: No lymphadenopathy, no thyromegaly.  LUNGS: Clear bilaterally.  HEART: Regular rate and rhythm without murmurs.  ABDOMEN: Normoactive bowel sounds, soft and nontender, no guarding or rebound tenderness, no CVA tenderness.  Dressing is clean and intact.  EXTREMITIES: No clubbing, cyanosis or edema.  NEUROLOGIC:  Cranial nerves II-XII grossly intact.      Last Recorded Vitals  Blood pressure 116/71, pulse 97, temperature 36.2 °C (97.2 °F), temperature source Temporal, resp. rate 16, height 1.651 m (5' 5\"), weight 95.7 kg (210 lb 15.7 oz), SpO2 98 %.  Intake/Output last 3 Shifts:  I/O last 3 completed shifts:  In: 2270.7 (23.7 mL/kg) [P.O.:980; I.V.:1090.7 (11.4 mL/kg); IV Piggyback:200]  Out: 1225 (12.8 mL/kg) [Urine:1150 (0.3 mL/kg/hr); Blood:75]  Weight: 95.7 kg     Relevant Results              Results for orders placed or performed during the hospital encounter of 03/28/24 (from the past 24 hour(s))   POCT GLUCOSE   Result Value Ref Range    POCT Glucose 132 (H) 74 - 99 mg/dL   POCT GLUCOSE   Result Value Ref Range    POCT Glucose 168 (H) 74 - 99 mg/dL   POCT GLUCOSE   Result Value Ref Range    POCT Glucose 168 (H) 74 - 99 mg/dL   Basic Metabolic Panel   Result Value Ref Range    Glucose 138 (H) 74 - 99 mg/dL    Sodium 137 136 - 145 mmol/L    Potassium 4.2 3.5 - 5.3 mmol/L    Chloride 105 98 - 107 mmol/L    Bicarbonate 24 21 - 32 mmol/L    Anion Gap 12 10 - 20 mmol/L    Urea Nitrogen 9 6 - 23 mg/dL    Creatinine " 0.81 0.50 - 1.05 mg/dL    eGFR 88 >60 mL/min/1.73m*2    Calcium 8.6 8.6 - 10.3 mg/dL   CBC   Result Value Ref Range    WBC 13.0 (H) 4.4 - 11.3 x10*3/uL    nRBC 0.0 0.0 - 0.0 /100 WBCs    RBC 3.57 (L) 4.00 - 5.20 x10*6/uL    Hemoglobin 8.9 (L) 12.0 - 16.0 g/dL    Hematocrit 29.5 (L) 36.0 - 46.0 %    MCV 83 80 - 100 fL    MCH 24.9 (L) 26.0 - 34.0 pg    MCHC 30.2 (L) 32.0 - 36.0 g/dL    RDW 14.7 (H) 11.5 - 14.5 %    Platelets 286 150 - 450 x10*3/uL                     Assessment/Plan   Principal Problem:    Fibroid uterus  Active Problems:    Menometrorrhagia    Uterine leiomyoma    POD #1  Patient is doing well.  Encourage ambulation.  Patient may shower today.       I spent 10 minutes in the professional and overall care of this patient.      Alf Guillen MD       no

## 2024-03-30 VITALS
DIASTOLIC BLOOD PRESSURE: 80 MMHG | HEART RATE: 85 BPM | BODY MASS INDEX: 35.15 KG/M2 | RESPIRATION RATE: 20 BRPM | SYSTOLIC BLOOD PRESSURE: 122 MMHG | TEMPERATURE: 97.9 F | OXYGEN SATURATION: 93 % | WEIGHT: 210.98 LBS | HEIGHT: 65 IN

## 2024-03-30 PROBLEM — D25.9 FIBROID UTERUS: Status: RESOLVED | Noted: 2024-03-28 | Resolved: 2024-03-30

## 2024-03-30 PROBLEM — N92.1 MENOMETRORRHAGIA: Status: RESOLVED | Noted: 2024-03-04 | Resolved: 2024-03-30

## 2024-03-30 LAB
ERYTHROCYTE [DISTWIDTH] IN BLOOD BY AUTOMATED COUNT: 14.8 % (ref 11.5–14.5)
HCT VFR BLD AUTO: 29.9 % (ref 36–46)
HGB BLD-MCNC: 8.8 G/DL (ref 12–16)
HOLD SPECIMEN: NORMAL
MCH RBC QN AUTO: 24.4 PG (ref 26–34)
MCHC RBC AUTO-ENTMCNC: 29.4 G/DL (ref 32–36)
MCV RBC AUTO: 83 FL (ref 80–100)
NRBC BLD-RTO: 0 /100 WBCS (ref 0–0)
PLATELET # BLD AUTO: 236 X10*3/UL (ref 150–450)
RBC # BLD AUTO: 3.61 X10*6/UL (ref 4–5.2)
WBC # BLD AUTO: 9 X10*3/UL (ref 4.4–11.3)

## 2024-03-30 PROCEDURE — 85027 COMPLETE CBC AUTOMATED: CPT | Performed by: OBSTETRICS & GYNECOLOGY

## 2024-03-30 PROCEDURE — 94668 MNPJ CHEST WALL SBSQ: CPT

## 2024-03-30 PROCEDURE — 36415 COLL VENOUS BLD VENIPUNCTURE: CPT | Performed by: OBSTETRICS & GYNECOLOGY

## 2024-03-30 PROCEDURE — 2500000001 HC RX 250 WO HCPCS SELF ADMINISTERED DRUGS (ALT 637 FOR MEDICARE OP): Performed by: OBSTETRICS & GYNECOLOGY

## 2024-03-30 PROCEDURE — 2500000001 HC RX 250 WO HCPCS SELF ADMINISTERED DRUGS (ALT 637 FOR MEDICARE OP): Performed by: STUDENT IN AN ORGANIZED HEALTH CARE EDUCATION/TRAINING PROGRAM

## 2024-03-30 PROCEDURE — 9420000001 HC RT PATIENT EDUCATION 5 MIN

## 2024-03-30 PROCEDURE — 2500000002 HC RX 250 W HCPCS SELF ADMINISTERED DRUGS (ALT 637 FOR MEDICARE OP, ALT 636 FOR OP/ED): Performed by: OBSTETRICS & GYNECOLOGY

## 2024-03-30 RX ORDER — OXYCODONE HYDROCHLORIDE 5 MG/1
5 CAPSULE ORAL EVERY 6 HOURS PRN
Qty: 15 CAPSULE | Refills: 0 | Status: SHIPPED | OUTPATIENT
Start: 2024-03-30

## 2024-03-30 RX ORDER — ALUMINUM HYDROXIDE, MAGNESIUM HYDROXIDE, AND SIMETHICONE 1200; 120; 1200 MG/30ML; MG/30ML; MG/30ML
20 SUSPENSION ORAL 4 TIMES DAILY PRN
Status: DISCONTINUED | OUTPATIENT
Start: 2024-03-30 | End: 2024-03-30 | Stop reason: HOSPADM

## 2024-03-30 RX ADMIN — OXYCODONE HYDROCHLORIDE 5 MG: 5 TABLET ORAL at 02:45

## 2024-03-30 RX ADMIN — PANTOPRAZOLE SODIUM 40 MG: 40 TABLET, DELAYED RELEASE ORAL at 06:47

## 2024-03-30 RX ADMIN — ATORVASTATIN CALCIUM 40 MG: 40 TABLET, FILM COATED ORAL at 08:24

## 2024-03-30 RX ADMIN — ACETAMINOPHEN 975 MG: 325 TABLET ORAL at 02:45

## 2024-03-30 RX ADMIN — GLIMEPIRIDE 1 MG: 2 TABLET ORAL at 08:25

## 2024-03-30 RX ADMIN — IBUPROFEN 800 MG: 800 TABLET, FILM COATED ORAL at 10:47

## 2024-03-30 RX ADMIN — METFORMIN HYDROCHLORIDE 1000 MG: 500 TABLET ORAL at 08:24

## 2024-03-30 RX ADMIN — OXYCODONE HYDROCHLORIDE 5 MG: 5 TABLET ORAL at 06:47

## 2024-03-30 RX ADMIN — ALUMINUM HYDROXIDE, MAGNESIUM HYDROXIDE, AND DIMETHICONE 20 ML: 200; 20; 200 SUSPENSION ORAL at 01:15

## 2024-03-30 RX ADMIN — MONTELUKAST 10 MG: 10 TABLET, FILM COATED ORAL at 08:25

## 2024-03-30 RX ADMIN — LOSARTAN POTASSIUM 25 MG: 25 TABLET, FILM COATED ORAL at 08:24

## 2024-03-30 ASSESSMENT — COGNITIVE AND FUNCTIONAL STATUS - GENERAL
DAILY ACTIVITIY SCORE: 24
MOBILITY SCORE: 24

## 2024-03-30 ASSESSMENT — PAIN DESCRIPTION - LOCATION: LOCATION: ABDOMEN

## 2024-03-30 ASSESSMENT — PAIN SCALES - GENERAL
PAINLEVEL_OUTOF10: 4
PAINLEVEL_OUTOF10: 5 - MODERATE PAIN

## 2024-03-30 ASSESSMENT — PAIN - FUNCTIONAL ASSESSMENT
PAIN_FUNCTIONAL_ASSESSMENT: 0-10

## 2024-03-30 ASSESSMENT — PAIN DESCRIPTION - DESCRIPTORS
DESCRIPTORS: ACHING;SHARP
DESCRIPTORS: ACHING;SHARP

## 2024-03-30 NOTE — CARE PLAN
The patient's goals for the shift include no pain have BM    The clinical goals for the shift include control pain, pass gas, have BM      Problem: Pain - Adult  Goal: Verbalizes/displays adequate comfort level or baseline comfort level  Outcome: Progressing     Problem: Safety - Adult  Goal: Free from fall injury  Outcome: Progressing     Problem: Discharge Planning  Goal: Discharge to home or other facility with appropriate resources  Outcome: Progressing     Problem: Chronic Conditions and Co-morbidities  Goal: Patient's chronic conditions and co-morbidity symptoms are monitored and maintained or improved  Outcome: Progressing     Problem: Diabetes  Goal: Achieve decreasing blood glucose levels by end of shift  Outcome: Progressing  Goal: Increase stability of blood glucose readings by end of shift  Outcome: Progressing  Goal: Maintain electrolyte levels within acceptable range throughout shift  Outcome: Progressing  Goal: Maintain glucose levels >70mg/dl to <250mg/dl throughout shift  Outcome: Progressing  Goal: No changes in neurological exam by end of shift  Outcome: Progressing  Goal: Vital signs within normal range for age by end of shift  Outcome: Progressing  Goal: Receive DSME education by end of shift  Outcome: Progressing     Problem: Fall/Injury  Goal: Not fall by end of shift  Outcome: Progressing  Goal: Be free from injury by end of the shift  Outcome: Progressing  Goal: Verbalize understanding of personal risk factors for fall in the hospital  Outcome: Progressing  Goal: Verbalize understanding of risk factor reduction measures to prevent injury from fall in the home  Outcome: Progressing  Goal: Use assistive devices by end of the shift  Outcome: Progressing  Goal: Pace activities to prevent fatigue by end of the shift  Outcome: Progressing     Problem: Pain  Goal: Takes deep breaths with improved pain control throughout the shift  Outcome: Progressing  Goal: Turns in bed with improved pain control  throughout the shift  Outcome: Progressing  Goal: Walks with improved pain control throughout the shift  Outcome: Progressing  Goal: Performs ADL's with improved pain control throughout shift  Outcome: Progressing  Goal: Participates in PT with improved pain control throughout the shift  Outcome: Progressing  Goal: Free from opioid side effects throughout the shift  Outcome: Progressing  Goal: Free from acute confusion related to pain meds throughout the shift  Outcome: Progressing

## 2024-03-30 NOTE — DISCHARGE INSTR - OTHER ORDERS
Hysterectomy Discharge Instructions    About this topic  The uterus is the organ where the baby grows during pregnancy. The uterus is also called the womb. The womb is in the lower belly between the bladder and the rectum. You have two ovaries. The ovaries are almond-shaped organs that make the eggs to make a baby. The ovaries also control your menstrual cycle. You also have two fallopian tubes. The eggs travel down the tube to reach the uterus or womb.  A hysterectomy is done to remove the uterus. Your cervix, which is the lower part of the womb that connects to the vagina, may also be removed. Sometimes, the ovaries or tubes are also taken out. Talk with your doctor to be sure you know what was removed.    What care is needed at home?  Ask your doctor what you need to do when you go home. Make sure you ask questions if you do not know what you need to do.  Talk to your doctor about how to care for your cut site. Ask your doctor about:  When you should change your bandages  When you may take a bath or shower  If you need to be careful with lifting things over 10 pounds (4.5 kg)  When you may go back to your normal activities like work, driving, or sex  Your bowel movements may take some time to get back to normal. Eat small meals high in fiber. Drink 6 to 8 glasses of water each day to avoid hard stools.  Use a small pillow to put pressure on your belly. The pressure can make you more comfortable when you cough, laugh, or do other actions.  You can expect some bleeding from your vagina for a few weeks. You may use sanitary pads but not tampons.  You can wash between your legs with soap and water. Most often, it is OK to start this 24 hours after your surgery.  What follow-up care is needed?  Your doctor may ask you to make visits to the office to check on your progress. Be sure to keep your visits.  You may have stitches or staples. If so, your doctor will often want to remove the stitches or staples in 1 to 2  weeks. If the doctor used skin glue, the glue will fall off on its own.  Your doctor will tell you if you need other drugs like hormone therapy. You may need lubricants for sex if your hormones have changed. Talk to your doctor about changing hormone levels.  If your fallopian tubes and ovaries were removed with the hysterectomy, your doctor will tell you if you need other drugs like hormone therapy.  Ask your doctor if you still need Pap tests after your surgery.  What drugs may be needed?  The doctor may order drugs to:  Help with pain  Prevent infection  Stop bleeding  Replace hormones  Keep your bones strong  Will physical activity be limited?  Rest for the first few days after the procedure. Avoid activities like heavy lifting and hard exercise. Recovery can last for several weeks after your surgery. Talk to your doctor about the right amount of activity for you.  What problems could happen?  Infertility if the uterus was removed  Infection  Wound opening  Bleeding  Blood clots in your legs or lungs  Injury to nearby organs  Menopause  Low mood  Problem controlling urine  Problems with sex  Weak bones  When do I need to call the doctor?  Signs of infection such as a fever of 100.4°F (38°C) or higher, chills, pain with passing urine, wound that will not heal, or anal itching.  Signs of wound infection such as swelling, redness, warmth around the wound; too much pain when touched; yellowish, greenish, or bloody discharge; foul smell coming from the cut site; cut site opens up.  Lots of blood in your sanitary pads or more than 6 soaked pads per day.  Upset stomach, throwing up, or very bad belly pain  No bowel movement after 3 days  You feel the need to pass urine but it will not come out even after 6 hours  Smelly green or dark yellow vaginal discharge  Low mood  Teach Back: Helping You Understand  The Teach Back Method helps you understand the information we are giving you. After you talk with the staff, tell  them in your own words what you learned. This helps to make sure the staff has described each thing clearly. It also helps to explain things that may have been confusing. Before going home, make sure you can do these:  I can tell you about my procedure.  I can tell you how to care for my cut site.  I can tell you what I will do if I have swelling, redness, discharge, or warmth around my wound.  Last Reviewed Date  2021-05-18  Consumer Information Use and Disclaimer  This generalized information is a limited summary of diagnosis, treatment, and/or medication information. It is not meant to be comprehensive and should be used as a tool to help the user understand and/or assess potential diagnostic and treatment options. It does NOT include all information about conditions, treatments, medications, side effects, or risks that may apply to a specific patient. It is not intended to be medical advice or a substitute for the medical advice, diagnosis, or treatment of a health care provider based on the health care provider's examination and assessment of a patient’s specific and unique circumstances. Patients must speak with a health care provider for complete information about their health, medical questions, and treatment options, including any risks or benefits regarding use of medications. This information does not endorse any treatments or medications as safe, effective, or approved for treating a specific patient. [employer] and its affiliates disclaim any warranty or liability relating to this information or the use thereof. The use of this information is governed by the Terms of Use, available at https://www.iCook.tw.com/en/know/clinical-effectiveness-terms  Copyright © 2024 [employer] and its affiliates and/or licensors. All rights reserved.

## 2024-03-30 NOTE — PROGRESS NOTES
03/30/24 0856   Discharge Planning   Type of Residence Private residence   Patient expects to be discharged to: Home no needs.     REMOTE COVERAGE- Attempt to reach pt by room and personal phone. Left Voicemail requesting return call. Per notes pt plan is home with assist from  and family. On-site SW confirm with charge nurse that pt to discharge without needs. CT will follow.     0900- Pt returned call, role of tCC explained. Pt reports that her mom is coming to stay through the weekend to get them through [facility] and then her  will be there to assist. PT already has work excuse and is on medical leave. Plan- Home no CT needs.

## 2024-03-30 NOTE — PROGRESS NOTES
"Nubia Martinez is a 49 y.o. female on day 2 of admission presenting with Fibroid uterus.    Subjective   POD #2  Patient is resting well.  She is ambulating well.  Passing flatus.       Objective   Well-developed, well nourished, in no acute distress, alert and oriented x three, is pleasant and cooperative.  HEENT: Clear. Pupils equal, round and reactive to light and accommodation. Extraocular muscles are intact. Oral mucosa pink without exudate.   NECK: No lymphadenopathy, no thyromegaly.  LUNGS: Clear bilaterally.  HEART: Regular rate and rhythm without murmurs.  ABDOMEN: Normoactive bowel sounds, soft and nontender, no guarding or rebound tenderness, no CVA tenderness.  Low Pfannenstiel incision is healing well, well-approximated, no erythema or drainage.  Staples in place.  EXTREMITIES: No clubbing, cyanosis or edema.  NEUROLOGIC:  Cranial nerves II-XII grossly intact.          Last Recorded Vitals  Blood pressure 122/80, pulse 85, temperature 36.6 °C (97.9 °F), temperature source Temporal, resp. rate 20, height 1.651 m (5' 5\"), weight 95.7 kg (210 lb 15.7 oz), SpO2 93 %.  Intake/Output last 3 Shifts:  I/O last 3 completed shifts:  In: 429.3 (4.5 mL/kg) [P.O.:240; I.V.:89.3 (0.9 mL/kg); IV Piggyback:100]  Out: 1 (0 mL/kg) [Stool:1]  Weight: 95.7 kg     Relevant Results        Results for orders placed or performed during the hospital encounter of 03/28/24 (from the past 24 hour(s))   CBC   Result Value Ref Range    WBC 9.0 4.4 - 11.3 x10*3/uL    nRBC 0.0 0.0 - 0.0 /100 WBCs    RBC 3.61 (L) 4.00 - 5.20 x10*6/uL    Hemoglobin 8.8 (L) 12.0 - 16.0 g/dL    Hematocrit 29.9 (L) 36.0 - 46.0 %    MCV 83 80 - 100 fL    MCH 24.4 (L) 26.0 - 34.0 pg    MCHC 29.4 (L) 32.0 - 36.0 g/dL    RDW 14.8 (H) 11.5 - 14.5 %    Platelets 236 150 - 450 x10*3/uL   Green Top   Result Value Ref Range    Extra Tube Hold for add-ons.                          Assessment/Plan   Principal Problem:    Fibroid uterus  Active Problems:    " Menometrorrhagia    Uterine leiomyoma    POD #2  Patient is progressing well.  Home today.  Follow-up in the office in 2 to 3 days for staple removal.       I spent 15 minutes in the professional and overall care of this patient.      Alf Guillen MD

## 2024-03-30 NOTE — DISCHARGE SUMMARY
Discharge Summary    Admission Date: 3/28/2024  Discharge Date:  March 30, 2024    Discharge Diagnosis  1. Fibroid uterus  oxyCODONE (Oxy-IR) 5 mg immediate release capsule      2. Menometrorrhagia  Surgical Pathology Exam    Surgical Pathology Exam    oxyCODONE (Oxy-IR) 5 mg immediate release capsule      3. Uterine leiomyoma, unspecified location  Surgical Pathology Exam    Surgical Pathology Exam         Procedure(s):  Total abdominal hysterectomy, bilateral salpingoophorectomy    Hospital Course  Patient underwent total abdominal hysterectomy and bilateral salpingo-oophorectomy on March 28, 2024.  Patient tolerated procedure well.  She received several doses of IV Ancef perioperatively.  She regained both bowel and bladder function.  Ambulating without difficulty.  Patient discharged on postop day 2.  Patient to follow-up in 2 to 3 days for staple removal.      Pertinent Physical Exam At Time of Discharge  Lungs: Clear bilaterally  Heart: Regular rate and rhythm  Abdomen: Soft and nontender, low Pfannenstiel incision is healing well, well-approximated, no erythema or drainage.  Staples in place.    Discharge Meds     Your medication list        START taking these medications        Instructions Last Dose Given Next Dose Due   oxyCODONE 5 mg immediate release capsule  Commonly known as: Oxy-IR      Take 1 capsule (5 mg) by mouth every 6 hours if needed for severe pain (7 - 10).              CHANGE how you take these medications        Instructions Last Dose Given Next Dose Due   metFORMIN  mg 24 hr tablet  Commonly known as: Glucophage-XR  What changed: See the new instructions.      TAKE FOUR TABLETS BY MOUTH ONCE DAILY              CONTINUE taking these medications        Instructions Last Dose Given Next Dose Due   atorvastatin 40 mg tablet  Commonly known as: Lipitor      TAKE ONE TABLET BY MOUTH ONCE DAILY       calcium carbonate 200 mg calcium chewable tablet  Commonly known as: Tums            cetirizine 10 mg tablet  Commonly known as: ZyrTEC      Take 1 tablet (10 mg) by mouth once daily as needed for allergies.       cyanocobalamin 1,000 mcg tablet  Commonly known as: Vitamin B-12           fluticasone 50 mcg/actuation nasal spray  Commonly known as: Flonase           FreeStyle Marciano 14 Day Sensor kit  Generic drug: flash glucose sensor kit      Inject 1 each under the skin every 14 (fourteen) days.       glimepiride 1 mg tablet  Commonly known as: Amaryl      TAKE ONE TABLET BY MOUTH ONCE DAILY       losartan 25 mg tablet  Commonly known as: Cozaar      TAKE ONE TABLET BY MOUTH ONCE DAILY AS DIRECTED       montelukast 10 mg tablet  Commonly known as: Singulair      TAKE ONE TABLET BY MOUTH ONCE DAILY       multivitamin-children's chewable tablet  Commonly known as: Cerovite, Jr           omeprazole 40 mg DR capsule  Commonly known as: PriLOSEC      TAKE ONE CAPSULE BY MOUTH ONCE DAILY                 Where to Get Your Medications        These medications were sent to [id number] - [place], [place] - [address]  [address] [facility] [address]      Phone: [phone]   oxyCODONE 5 mg immediate release capsule          Complications Requiring Follow-Up  None    Test Results Pending At Discharge  Pending Labs       Order Current Status    Surgical Pathology Exam In process            Outpatient Follow-Up  Future Appointments   Date Time Provider Department Center   4/12/2024 10:15 AM Alf Guillen MD [id number] [facility]   4/23/2024 10:15 AM Alf Guillen MD [id number] [facility]   6/13/2024  8:20 AM Lisandra Denney MD [id number] [facility]   8/16/2024  9:45 AM HOLLEY TONYA [facility]   12/23/2024  9:30 AM Alf Guillen MD [id number] [facility] spent 15 minutes in the professional and overall care of this patient.      Alf Guillen MD

## 2024-04-02 ENCOUNTER — OFFICE VISIT (OUTPATIENT)
Dept: OBSTETRICS AND GYNECOLOGY | Facility: CLINIC | Age: 49
End: 2024-04-02
Payer: COMMERCIAL

## 2024-04-02 VITALS — DIASTOLIC BLOOD PRESSURE: 74 MMHG | BODY MASS INDEX: 32.85 KG/M2 | WEIGHT: 197.4 LBS | SYSTOLIC BLOOD PRESSURE: 120 MMHG

## 2024-04-02 DIAGNOSIS — Z48.02 ENCOUNTER FOR STAPLE REMOVAL: Primary | ICD-10-CM

## 2024-04-02 PROCEDURE — 4010F ACE/ARB THERAPY RXD/TAKEN: CPT | Performed by: OBSTETRICS & GYNECOLOGY

## 2024-04-02 PROCEDURE — 3078F DIAST BP <80 MM HG: CPT | Performed by: OBSTETRICS & GYNECOLOGY

## 2024-04-02 PROCEDURE — 99024 POSTOP FOLLOW-UP VISIT: CPT | Performed by: OBSTETRICS & GYNECOLOGY

## 2024-04-02 PROCEDURE — 3074F SYST BP LT 130 MM HG: CPT | Performed by: OBSTETRICS & GYNECOLOGY

## 2024-04-02 PROCEDURE — 3048F LDL-C <100 MG/DL: CPT | Performed by: OBSTETRICS & GYNECOLOGY

## 2024-04-02 PROCEDURE — 1036F TOBACCO NON-USER: CPT | Performed by: OBSTETRICS & GYNECOLOGY

## 2024-04-02 PROCEDURE — 3051F HG A1C>EQUAL 7.0%<8.0%: CPT | Performed by: OBSTETRICS & GYNECOLOGY

## 2024-04-02 NOTE — PROGRESS NOTES
Nubia Martinez is a 49 y.o. year old female patient.  PCP = Lisandra Denney MD    Chief Complaint   Patient presents with    Wound Check     PT here for staple removal post REBECCA. Denies fever, chills or drainage.        HPI   Presents for staple removal following total abdominal hysterectomy and bilateral salpingo-oophorectomy performed on 2024.  She voices no complaints and is doing well.    OB History          2    Para   2    Term   2       0    AB   0    Living   2         SAB   0    IAB   0    Ectopic   0    Multiple   0    Live Births   2                 Past Medical History:   Diagnosis Date    Anemia     Anemia     Diabetes (CMS/[facility])     Encounter for  delivery without indication     Delivery of pregnancy by  section    Encounter for gynecological examination (general) (routine) without abnormal findings 2021    Pap test, as part of routine gynecological examination    Fibroid     GERD (gastroesophageal reflux disease)     Gestational diabetes     Hyperlipidemia, unspecified 10/20/2022    Hyperlipidemia    Hypertension     Other recurrent depressive disorders (CMS/[facility]) 01/15/2020    Seasonal affective disorder    Other specified abnormal findings of blood chemistry 2022    Low vitamin D level       Past Surgical History:   Procedure Laterality Date    ANAL SPHINCTEROTOMY      APPENDECTOMY  1986     SECTION, LOW TRANSVERSE  01/27/2000    x2    COLONOSCOPY      DILATION AND CURETTAGE OF UTERUS  10/26/2023    ENDOMETRIAL BIOPSY  2023    ESOPHAGOGASTRODUODENOSCOPY      ESOPHAGUS SURGERY      for GERD    HYSTEROSCOPY  10/26/2023    MYOMECTOMY      TOTAL ABDOMINAL HYSTERECTOMY W/ BILATERAL SALPINGOOPHORECTOMY         Review of Systems:   Constitutional: No fever or chills  Respiratory: No shortness of breath, or cough  Cardiovascular: No chest pain or syncope  Breasts: No breast pain, no masses, no nipple discharge  Gastrointestinal: No nausea,  vomiting, or diarrhea, no abdominal pain  Genitourinary: No dysuria or frequency  Gynecology: Negative except as noted in history of present illness  All other: All other systems reviewed and negative for complaint    Medication Documentation Review Audit       Reviewed by lAf Guillen MD (Physician) on 04/02/24 at 1432      Medication Order Taking? Sig Documenting Provider Last Dose Status   atorvastatin (Lipitor) 40 mg tablet [id number] No TAKE ONE TABLET BY MOUTH ONCE DAILY Lisandra Denney MD 3/28/2024 Active   calcium carbonate (Tums) 200 mg calcium chewable tablet [id number] No Chew 2 tablets (1,000 mg) once daily. Historical Provider, MD More than a month Active   cetirizine (ZyrTEC) 10 mg tablet [id number] No Take 1 tablet (10 mg) by mouth once daily as needed for allergies. Lisandra Denney MD 3/27/2024 Active   cyanocobalamin (Vitamin B-12) 1,000 mcg tablet [id number] No Take 100 mcg by mouth once daily. Historical Provider, MD 3/27/2024 Active   flash glucose sensor kit (FreeStyle Marciano 14 Day Sensor) kit [id number] No Inject 1 each under the skin every 14 (fourteen) days. Lisandra Denney MD 3/28/2024 Active   fluticasone (Flonase) 50 mcg/actuation nasal spray [id number] No Administer 2 sprays into each nostril once daily. Shake gently. Before first use, prime pump. After use, clean tip and replace cap. Historical Provider, MD Past Month 2 WEEKS Active   glimepiride (Amaryl) 1 mg tablet [id number] No TAKE ONE TABLET BY MOUTH ONCE DAILY Lisandra Denney MD 3/27/2024 Active   losartan (Cozaar) 25 mg tablet [id number] No TAKE ONE TABLET BY MOUTH ONCE DAILY AS DIRECTED Lisandra Denney MD 3/27/2024 Active   metFORMIN  mg 24 hr tablet [id number] No TAKE FOUR TABLETS BY MOUTH ONCE DAILY   Patient taking differently: Take 2 tablets (1,000 mg) by mouth 2 times a day with meals.    Lisandra Denney MD 3/27/2024 Active   montelukast (Singulair) 10 mg tablet [id number] No TAKE ONE TABLET BY MOUTH ONCE DAILY Lisandra Denney MD  Past Week 3/26/24 Active   multivitamin-children's (Cerovite, Jr) chewable tablet [id number] No Chew 1 tablet once daily. Historical Provider, MD 3/28/2024 Active   omeprazole (PriLOSEC) 40 mg DR capsule [id number] No TAKE ONE CAPSULE BY MOUTH ONCE DAILY Lisandra Denney MD 3/27/2024 Active   oxyCODONE (Oxy-IR) 5 mg immediate release capsule [id number]  Take 1 capsule (5 mg) by mouth every 6 hours if needed for severe pain (7 - 10). Alf Guillen MD  Active                     /74   Wt 89.5 kg (197 lb 6.4 oz)   LMP 12/01/2023 (Exact Date)   BMI 32.85 kg/m²     PHYSICAL EXAMINATION:  Well-developed, well nourished, in no acute distress, alert and oriented x three, is pleasant and cooperative.  HEENT: Clear. Pupils equal, round and reactive to light and accommodation. Extraocular muscles are intact. Oral mucosa pink without exudate.   NECK: No lymphadenopathy, no thyromegaly.  LUNGS: Clear bilaterally.  HEART: Regular rate and rhythm without murmurs.  ABDOMEN: Normoactive bowel sounds, soft and nontender, no guarding or rebound tenderness, no CVA tenderness.  Pfannenstiel incision is healing well, well-approximated, no erythema or drainage.  Staples were removed and Steri-Strips were applied.  EXTREMITIES: No clubbing, cyanosis or edema.  NEUROLOGIC:  Cranial nerves II-XII grossly intact.             Problem List Items Addressed This Visit    None  Visit Diagnoses       Encounter for staple removal    -  Primary             Provider Impression:  1.  Staple removal  Follow-up in a week for incision check.

## 2024-04-05 LAB
LABORATORY COMMENT REPORT: NORMAL
PATH REPORT.FINAL DX SPEC: NORMAL
PATH REPORT.GROSS SPEC: NORMAL
PATH REPORT.RELEVANT HX SPEC: NORMAL
PATH REPORT.TOTAL CANCER: NORMAL

## 2024-04-12 ENCOUNTER — OFFICE VISIT (OUTPATIENT)
Dept: OBSTETRICS AND GYNECOLOGY | Facility: CLINIC | Age: 49
End: 2024-04-12
Payer: COMMERCIAL

## 2024-04-12 VITALS
DIASTOLIC BLOOD PRESSURE: 74 MMHG | BODY MASS INDEX: 31.82 KG/M2 | HEIGHT: 65 IN | SYSTOLIC BLOOD PRESSURE: 118 MMHG | WEIGHT: 191 LBS

## 2024-04-12 DIAGNOSIS — R30.0 BURNING WITH URINATION: ICD-10-CM

## 2024-04-12 DIAGNOSIS — Z48.89 POSTOPERATIVE VISIT: Primary | ICD-10-CM

## 2024-04-12 LAB
POC APPEARANCE, URINE: CLEAR
POC BILIRUBIN, URINE: NEGATIVE
POC BLOOD, URINE: NEGATIVE
POC COLOR, URINE: YELLOW
POC GLUCOSE, URINE: NEGATIVE MG/DL
POC KETONES, URINE: NEGATIVE MG/DL
POC LEUKOCYTES, URINE: NEGATIVE
POC NITRITE,URINE: NEGATIVE
POC PH, URINE: 5.5 PH
POC PROTEIN, URINE: NEGATIVE MG/DL
POC SPECIFIC GRAVITY, URINE: 1.02
POC UROBILINOGEN, URINE: 0.2 EU/DL

## 2024-04-12 PROCEDURE — 4010F ACE/ARB THERAPY RXD/TAKEN: CPT | Performed by: OBSTETRICS & GYNECOLOGY

## 2024-04-12 PROCEDURE — 3048F LDL-C <100 MG/DL: CPT | Performed by: OBSTETRICS & GYNECOLOGY

## 2024-04-12 PROCEDURE — 3078F DIAST BP <80 MM HG: CPT | Performed by: OBSTETRICS & GYNECOLOGY

## 2024-04-12 PROCEDURE — 3074F SYST BP LT 130 MM HG: CPT | Performed by: OBSTETRICS & GYNECOLOGY

## 2024-04-12 PROCEDURE — 81003 URINALYSIS AUTO W/O SCOPE: CPT | Performed by: OBSTETRICS & GYNECOLOGY

## 2024-04-12 PROCEDURE — 3051F HG A1C>EQUAL 7.0%<8.0%: CPT | Performed by: OBSTETRICS & GYNECOLOGY

## 2024-04-12 PROCEDURE — 1036F TOBACCO NON-USER: CPT | Performed by: OBSTETRICS & GYNECOLOGY

## 2024-04-12 PROCEDURE — 99024 POSTOP FOLLOW-UP VISIT: CPT | Performed by: OBSTETRICS & GYNECOLOGY

## 2024-04-12 NOTE — PROGRESS NOTES
Nubia Martinez is a 49 y.o. year old female patient.  PCP = Lisandra Denney MD    Chief Complaint   Patient presents with    Wound Check     ABD HYSTERECTOMY       HPI   Presents to inspect the incision following total abdominal hysterectomy, bilateral salpingo-oophorectomy.  She voices no complaints and is doing well.  She wished to have her urine checked for a bladder infection as she has some burning with urination.    OB History          2    Para   2    Term   2       0    AB   0    Living   2         SAB   0    IAB   0    Ectopic   0    Multiple   0    Live Births   2                 Past Medical History:   Diagnosis Date    Anemia     Anemia     Diabetes (Multi)     Encounter for  delivery without indication ([facility])     Delivery of pregnancy by  section    Encounter for gynecological examination (general) (routine) without abnormal findings 2021    Pap test, as part of routine gynecological examination    Fibroid     GERD (gastroesophageal reflux disease)     Gestational diabetes ([facility])     Hyperlipidemia, unspecified 10/20/2022    Hyperlipidemia    Hypertension     Other recurrent depressive disorders (CMS-HCC) 01/15/2020    Seasonal affective disorder    Other specified abnormal findings of blood chemistry 2022    Low vitamin D level       Past Surgical History:   Procedure Laterality Date    ANAL SPHINCTEROTOMY      APPENDECTOMY  1986     SECTION, LOW TRANSVERSE  01/27/2000    x2    COLONOSCOPY      DILATION AND CURETTAGE OF UTERUS  10/26/2023    ENDOMETRIAL BIOPSY  2023    ESOPHAGOGASTRODUODENOSCOPY      ESOPHAGUS SURGERY      for GERD    HYSTEROSCOPY  10/26/2023    MYOMECTOMY      TOTAL ABDOMINAL HYSTERECTOMY W/ BILATERAL SALPINGOOPHORECTOMY         Review of Systems:   Constitutional: No fever or chills  Respiratory: No shortness of breath, or cough  Cardiovascular: No chest pain or syncope  Breasts: No breast pain, no masses, no nipple  discharge  Gastrointestinal: No nausea, vomiting, or diarrhea, no abdominal pain  Genitourinary: No dysuria or frequency  Gynecology: Negative except as noted in history of present illness  All other: All other systems reviewed and negative for complaint    Medication Documentation Review Audit       Reviewed by Yandy Bashir MA (Medical Assistant) on 04/12/24 at 1009      Medication Order Taking? Sig Documenting Provider Last Dose Status   atorvastatin (Lipitor) 40 mg tablet [id number] No TAKE ONE TABLET BY MOUTH ONCE DAILY Lisandra Denney MD 3/28/2024 Active   calcium carbonate (Tums) 200 mg calcium chewable tablet [id number] No Chew 2 tablets (1,000 mg) once daily. Historical Provider, MD More than a month Active   cetirizine (ZyrTEC) 10 mg tablet [id number] No Take 1 tablet (10 mg) by mouth once daily as needed for allergies. Lisandra Denney MD 3/27/2024 Active   cyanocobalamin (Vitamin B-12) 1,000 mcg tablet [id number] No Take 100 mcg by mouth once daily. Historical Provider, MD 3/27/2024 Active   flash glucose sensor kit (FreeStyle Marciano 14 Day Sensor) kit [id number] No Inject 1 each under the skin every 14 (fourteen) days. Lisandra Denney MD 3/28/2024 Active   fluticasone (Flonase) 50 mcg/actuation nasal spray [id number] No Administer 2 sprays into each nostril once daily. Shake gently. Before first use, prime pump. After use, clean tip and replace cap. Historical Provider, MD Past Month 2 WEEKS Active   glimepiride (Amaryl) 1 mg tablet [id number] No TAKE ONE TABLET BY MOUTH ONCE DAILY Lisandra Denney MD 3/27/2024 Active   losartan (Cozaar) 25 mg tablet [id number] No TAKE ONE TABLET BY MOUTH ONCE DAILY AS DIRECTED Lisandra Denney MD 3/27/2024 Active   metFORMIN  mg 24 hr tablet [id number] No TAKE FOUR TABLETS BY MOUTH ONCE DAILY   Patient taking differently: Take 2 tablets (1,000 mg) by mouth 2 times a day with meals.    Lisandra Denney MD 3/27/2024 Active   montelukast (Singulair) 10 mg tablet [id number] No TAKE ONE  "TABLET BY MOUTH ONCE DAILY Lisandra Denney MD Past Week 3/26/24 Active   multivitamin-children's (Cerovite, Jr) chewable tablet [id number] No Chew 1 tablet once daily. Historical Provider, MD 3/28/2024 Active   omeprazole (PriLOSEC) 40 mg DR capsule [id number] No TAKE ONE CAPSULE BY MOUTH ONCE DAILY Lisandra Denney MD 3/27/2024 Active   oxyCODONE (Oxy-IR) 5 mg immediate release capsule [id number]  Take 1 capsule (5 mg) by mouth every 6 hours if needed for severe pain (7 - 10). Alf Guillen MD  Active                     /74   Ht 1.651 m (5' 5\")   Wt 86.6 kg (191 lb)   LMP 12/01/2023 (Exact Date)   BMI 31.78 kg/m²     PHYSICAL EXAMINATION:  Well-developed, well nourished, in no acute distress, alert and oriented x three, is pleasant and cooperative.  HEENT: Clear. Pupils equal, round and reactive to light and accommodation. Extraocular muscles are intact. Oral mucosa pink without exudate.   NECK: No lymphadenopathy, no thyromegaly.  LUNGS: Clear bilaterally.  HEART: Regular rate and rhythm without murmurs.  ABDOMEN: Normoactive bowel sounds, soft and nontender, no guarding or rebound tenderness, no CVA tenderness.  Low Pfannenstiel incision is healing well, well-approximated, no erythema or drainage.  EXTREMITIES: No clubbing, cyanosis or edema.  NEUROLOGIC:  Cranial nerves II-XII grossly intact.          Orders Placed This Encounter   Procedures    POCT UA Automated manually resulted     Order Specific Question:   Release result to [facility]     Answer:   Immediate [1]        Problem List Items Addressed This Visit    None  Visit Diagnoses       Postoperative visit    -  Primary    Burning with urination        Relevant Orders    POCT UA Automated manually resulted (Completed)             Provider Impression:  1.  Postop incision check  Patient informed that the urine dipstick is negative for bladder infection.  Patient to return in several weeks for her postop exam.    "

## 2024-04-23 ENCOUNTER — OFFICE VISIT (OUTPATIENT)
Dept: OBSTETRICS AND GYNECOLOGY | Facility: CLINIC | Age: 49
End: 2024-04-23
Payer: COMMERCIAL

## 2024-04-23 VITALS
HEIGHT: 65 IN | DIASTOLIC BLOOD PRESSURE: 72 MMHG | SYSTOLIC BLOOD PRESSURE: 120 MMHG | BODY MASS INDEX: 31.89 KG/M2 | WEIGHT: 191.4 LBS

## 2024-04-23 DIAGNOSIS — Z48.89 POSTOPERATIVE VISIT: Primary | ICD-10-CM

## 2024-04-23 PROCEDURE — 3051F HG A1C>EQUAL 7.0%<8.0%: CPT | Performed by: OBSTETRICS & GYNECOLOGY

## 2024-04-23 PROCEDURE — 3048F LDL-C <100 MG/DL: CPT | Performed by: OBSTETRICS & GYNECOLOGY

## 2024-04-23 PROCEDURE — 1036F TOBACCO NON-USER: CPT | Performed by: OBSTETRICS & GYNECOLOGY

## 2024-04-23 PROCEDURE — 3074F SYST BP LT 130 MM HG: CPT | Performed by: OBSTETRICS & GYNECOLOGY

## 2024-04-23 PROCEDURE — 3078F DIAST BP <80 MM HG: CPT | Performed by: OBSTETRICS & GYNECOLOGY

## 2024-04-23 PROCEDURE — 4010F ACE/ARB THERAPY RXD/TAKEN: CPT | Performed by: OBSTETRICS & GYNECOLOGY

## 2024-04-23 PROCEDURE — 99024 POSTOP FOLLOW-UP VISIT: CPT | Performed by: OBSTETRICS & GYNECOLOGY

## 2024-04-23 NOTE — PROGRESS NOTES
Nubia Martinez is a 49 y.o. year old female patient.  PCP = Lisandra Denney MD    Chief Complaint   Patient presents with    Post-op Follow-up     Patient is here for a 4 week post-op abdominal hysterectomy. Patient would like to go over the pathology report.        HPI   Presents  for postop checkup following total abdominal hysterectomy and bilateral salpingo-oophorectomy.  She voices no complaints and is doing well.  Denies any bowel or bladder problems.  Denies any vaginal bleeding or discharge.    OB History          2    Para   2    Term   2       0    AB   0    Living   2         SAB   0    IAB   0    Ectopic   0    Multiple   0    Live Births   2                 Past Medical History:   Diagnosis Date    Anemia     Anemia     Diabetes (Multi)     Encounter for  delivery without indication ([facility])     Delivery of pregnancy by  section    Encounter for gynecological examination (general) (routine) without abnormal findings 2021    Pap test, as part of routine gynecological examination    Fibroid     GERD (gastroesophageal reflux disease)     Gestational diabetes ([facility])     Hyperlipidemia, unspecified 10/20/2022    Hyperlipidemia    Hypertension     Other recurrent depressive disorders (CMS-HCC) 01/15/2020    Seasonal affective disorder    Other specified abnormal findings of blood chemistry 2022    Low vitamin D level       Past Surgical History:   Procedure Laterality Date    ANAL SPHINCTEROTOMY      APPENDECTOMY  1986     SECTION, LOW TRANSVERSE  01/27/2000    x2    COLONOSCOPY      DILATION AND CURETTAGE OF UTERUS  10/26/2023    ENDOMETRIAL BIOPSY  2023    ESOPHAGOGASTRODUODENOSCOPY      ESOPHAGUS SURGERY      for GERD    HYSTEROSCOPY  10/26/2023    MYOMECTOMY      TOTAL ABDOMINAL HYSTERECTOMY W/ BILATERAL SALPINGOOPHORECTOMY         Review of Systems:   Constitutional: No fever or chills  Respiratory: No shortness of breath, or  cough  Cardiovascular: No chest pain or syncope  Breasts: No breast pain, no masses, no nipple discharge  Gastrointestinal: No nausea, vomiting, or diarrhea, no abdominal pain  Genitourinary: No dysuria or frequency  Gynecology: Negative except as noted in history of present illness  All other: All other systems reviewed and negative for complaint    Medication Documentation Review Audit       Reviewed by Alf Guillen MD (Physician) on 04/23/24 at 1023      Medication Order Taking? Sig Documenting Provider Last Dose Status   atorvastatin (Lipitor) 40 mg tablet [id number] No TAKE ONE TABLET BY MOUTH ONCE DAILY Lisandra Denney MD 3/28/2024 Active   calcium carbonate (Tums) 200 mg calcium chewable tablet [id number] No Chew 2 tablets (1,000 mg) once daily. Historical Provider, MD More than a month Active   cetirizine (ZyrTEC) 10 mg tablet [id number] No Take 1 tablet (10 mg) by mouth once daily as needed for allergies. Lisandra Denney MD 3/27/2024 Active   cyanocobalamin (Vitamin B-12) 1,000 mcg tablet [id number] No Take 100 mcg by mouth once daily. Historical Provider, MD 3/27/2024 Active   flash glucose sensor kit (FreeStyle Marciano 14 Day Sensor) kit [id number] No Inject 1 each under the skin every 14 (fourteen) days. Lisandra Denney MD 3/28/2024 Active   fluticasone (Flonase) 50 mcg/actuation nasal spray [id number] No Administer 2 sprays into each nostril once daily. Shake gently. Before first use, prime pump. After use, clean tip and replace cap. Historical Provider, MD Past Month 2 WEEKS Active   glimepiride (Amaryl) 1 mg tablet [id number] No TAKE ONE TABLET BY MOUTH ONCE DAILY Lisandra Denney MD 3/27/2024 Active   losartan (Cozaar) 25 mg tablet [id number] No TAKE ONE TABLET BY MOUTH ONCE DAILY AS DIRECTED Lisandra Denney MD 3/27/2024 Active   metFORMIN  mg 24 hr tablet [id number] No TAKE FOUR TABLETS BY MOUTH ONCE DAILY   Patient taking differently: Take 2 tablets (1,000 mg) by mouth 2 times a day with meals.    Lisandra DICK  "MD Олег 3/27/2024 Active   montelukast (Singulair) 10 mg tablet [id number] No TAKE ONE TABLET BY MOUTH ONCE DAILY Lisandra Denney MD Past Week 3/26/24 Active   multivitamin-children's (Cerovite, Jr) chewable tablet [id number] No Chew 1 tablet once daily. Historical MD Marlyn 3/28/2024 Active   omeprazole (PriLOSEC) 40 mg DR capsule [id number] No TAKE ONE CAPSULE BY MOUTH ONCE DAILY Lisandra Denney MD 3/27/2024 Active   oxyCODONE (Oxy-IR) 5 mg immediate release capsule [id number]  Take 1 capsule (5 mg) by mouth every 6 hours if needed for severe pain (7 - 10). Alf Guillen MD  Active                     /72   Ht 1.651 m (5' 5\")   Wt 86.8 kg (191 lb 6.4 oz)   LMP 12/01/2023 (Exact Date)   BMI 31.85 kg/m²     PHYSICAL EXAMINATION:  PHYSICAL EXAMINATION:  Well-developed, well nourished, in no acute distress, alert and oriented x three, is pleasant and cooperative.   HEENT: Clear. Pupils equal, round and reactive to light and accommodation. Extraocular muscles are intact. Oral mucosa pink without exudate.   NECK: No lymphadenopathy, no thyromegaly.  LUNGS: Clear bilaterally.  HEART: Regular rate and rhythm without murmurs.  ABDOMEN: Normoactive bowel sounds, soft and nontender, no guarding or rebound tenderness, no CVA tenderness.  Low Pfannenstiel incision is healing well, well-approximated, no erythema or drainage.  EXTREMITIES: No clubbing, cyanosis or edema.  NEUROLOGIC:  Cranial nerves II-XII grossly intact.  :  Normal external female genitalia.  Normal vulva and vagina. Normal urethral meatus, urethra and bladder. Noted surgical absence of the cervix and uterus. Well-healed vaginal cuff.            Problem List Items Addressed This Visit    None  Visit Diagnoses       Postoperative visit    -  Primary             Provider Impression:  1.  Postop check  Patient again reminded to avoid intercourse for 8 weeks after surgery.  Patient to return later in the year for her annual exam.    "

## 2024-05-02 DIAGNOSIS — Z00.00 HEALTHCARE MAINTENANCE: ICD-10-CM

## 2024-05-06 RX ORDER — GLIMEPIRIDE 1 MG/1
1 TABLET ORAL DAILY
Qty: 90 TABLET | Refills: 1 | Status: SHIPPED | OUTPATIENT
Start: 2024-05-06

## 2024-05-21 ENCOUNTER — TELEPHONE (OUTPATIENT)
Dept: OBSTETRICS AND GYNECOLOGY | Facility: CLINIC | Age: 49
End: 2024-05-21
Payer: COMMERCIAL

## 2024-05-21 NOTE — TELEPHONE ENCOUNTER
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

## 2024-06-11 ENCOUNTER — LAB (OUTPATIENT)
Dept: LAB | Facility: LAB | Age: 49
End: 2024-06-11
Payer: COMMERCIAL

## 2024-06-11 DIAGNOSIS — E11.65 TYPE 2 DIABETES MELLITUS WITH HYPERGLYCEMIA, WITHOUT LONG-TERM CURRENT USE OF INSULIN (MULTI): ICD-10-CM

## 2024-06-11 LAB
ANION GAP SERPL CALC-SCNC: 14 MMOL/L (ref 10–20)
BASOPHILS # BLD AUTO: 0.04 X10*3/UL (ref 0–0.1)
BASOPHILS NFR BLD AUTO: 0.5 %
BUN SERPL-MCNC: 15 MG/DL (ref 6–23)
CALCIUM SERPL-MCNC: 8.9 MG/DL (ref 8.6–10.3)
CHLORIDE SERPL-SCNC: 105 MMOL/L (ref 98–107)
CO2 SERPL-SCNC: 26 MMOL/L (ref 21–32)
CREAT SERPL-MCNC: 0.71 MG/DL (ref 0.5–1.05)
EGFRCR SERPLBLD CKD-EPI 2021: >90 ML/MIN/1.73M*2
EOSINOPHIL # BLD AUTO: 0.59 X10*3/UL (ref 0–0.7)
EOSINOPHIL NFR BLD AUTO: 7.1 %
ERYTHROCYTE [DISTWIDTH] IN BLOOD BY AUTOMATED COUNT: 15.1 % (ref 11.5–14.5)
EST. AVERAGE GLUCOSE BLD GHB EST-MCNC: 163 MG/DL
GLUCOSE SERPL-MCNC: 149 MG/DL (ref 74–99)
HBA1C MFR BLD: 7.3 %
HCT VFR BLD AUTO: 39.3 % (ref 36–46)
HGB BLD-MCNC: 11.3 G/DL (ref 12–16)
IMM GRANULOCYTES # BLD AUTO: 0.04 X10*3/UL (ref 0–0.7)
IMM GRANULOCYTES NFR BLD AUTO: 0.5 % (ref 0–0.9)
IRON SATN MFR SERPL: 7 % (ref 25–45)
IRON SERPL-MCNC: 31 UG/DL (ref 35–150)
LYMPHOCYTES # BLD AUTO: 2.01 X10*3/UL (ref 1.2–4.8)
LYMPHOCYTES NFR BLD AUTO: 24.2 %
MCH RBC QN AUTO: 22.8 PG (ref 26–34)
MCHC RBC AUTO-ENTMCNC: 28.8 G/DL (ref 32–36)
MCV RBC AUTO: 79 FL (ref 80–100)
MONOCYTES # BLD AUTO: 0.4 X10*3/UL (ref 0.1–1)
MONOCYTES NFR BLD AUTO: 4.8 %
NEUTROPHILS # BLD AUTO: 5.24 X10*3/UL (ref 1.2–7.7)
NEUTROPHILS NFR BLD AUTO: 62.9 %
NRBC BLD-RTO: 0 /100 WBCS (ref 0–0)
PLATELET # BLD AUTO: 336 X10*3/UL (ref 150–450)
POTASSIUM SERPL-SCNC: 4.5 MMOL/L (ref 3.5–5.3)
RBC # BLD AUTO: 4.96 X10*6/UL (ref 4–5.2)
SODIUM SERPL-SCNC: 140 MMOL/L (ref 136–145)
TIBC SERPL-MCNC: 418 UG/DL (ref 240–445)
UIBC SERPL-MCNC: 387 UG/DL (ref 110–370)
WBC # BLD AUTO: 8.3 X10*3/UL (ref 4.4–11.3)

## 2024-06-11 PROCEDURE — 80048 BASIC METABOLIC PNL TOTAL CA: CPT

## 2024-06-11 PROCEDURE — 83540 ASSAY OF IRON: CPT

## 2024-06-11 PROCEDURE — 83550 IRON BINDING TEST: CPT

## 2024-06-11 PROCEDURE — 83036 HEMOGLOBIN GLYCOSYLATED A1C: CPT

## 2024-06-11 PROCEDURE — 85025 COMPLETE CBC W/AUTO DIFF WBC: CPT

## 2024-06-11 PROCEDURE — 36415 COLL VENOUS BLD VENIPUNCTURE: CPT

## 2024-06-13 ENCOUNTER — APPOINTMENT (OUTPATIENT)
Dept: PRIMARY CARE | Facility: CLINIC | Age: 49
End: 2024-06-13
Payer: COMMERCIAL

## 2024-06-13 VITALS
SYSTOLIC BLOOD PRESSURE: 122 MMHG | BODY MASS INDEX: 31.7 KG/M2 | WEIGHT: 190.5 LBS | HEART RATE: 103 BPM | OXYGEN SATURATION: 96 % | DIASTOLIC BLOOD PRESSURE: 82 MMHG

## 2024-06-13 DIAGNOSIS — J30.2 SEASONAL ALLERGIES: ICD-10-CM

## 2024-06-13 DIAGNOSIS — E78.49 OTHER HYPERLIPIDEMIA: ICD-10-CM

## 2024-06-13 DIAGNOSIS — E11.65 TYPE 2 DIABETES MELLITUS WITH HYPERGLYCEMIA, WITHOUT LONG-TERM CURRENT USE OF INSULIN (MULTI): Primary | ICD-10-CM

## 2024-06-13 PROBLEM — D25.9 UTERINE LEIOMYOMA: Status: RESOLVED | Noted: 2024-03-04 | Resolved: 2024-06-13

## 2024-06-13 PROCEDURE — 1036F TOBACCO NON-USER: CPT | Performed by: FAMILY MEDICINE

## 2024-06-13 PROCEDURE — 4010F ACE/ARB THERAPY RXD/TAKEN: CPT | Performed by: FAMILY MEDICINE

## 2024-06-13 PROCEDURE — 3048F LDL-C <100 MG/DL: CPT | Performed by: FAMILY MEDICINE

## 2024-06-13 PROCEDURE — 3051F HG A1C>EQUAL 7.0%<8.0%: CPT | Performed by: FAMILY MEDICINE

## 2024-06-13 PROCEDURE — 99214 OFFICE O/P EST MOD 30 MIN: CPT | Performed by: FAMILY MEDICINE

## 2024-06-13 PROCEDURE — 3074F SYST BP LT 130 MM HG: CPT | Performed by: FAMILY MEDICINE

## 2024-06-13 PROCEDURE — 3079F DIAST BP 80-89 MM HG: CPT | Performed by: FAMILY MEDICINE

## 2024-06-13 ASSESSMENT — PATIENT HEALTH QUESTIONNAIRE - PHQ9
1. LITTLE INTEREST OR PLEASURE IN DOING THINGS: NOT AT ALL
2. FEELING DOWN, DEPRESSED OR HOPELESS: NOT AT ALL
SUM OF ALL RESPONSES TO PHQ9 QUESTIONS 1 AND 2: 0

## 2024-06-13 NOTE — PROGRESS NOTES
Patient presents for periodic surveillance of chronic medical problems.     Subjective  Nubia Martinez is a 49 y.o. female who presents for Follow-up and Anemia (3 mo9nth).  Anemia      DM, A1c 7.3, will leave meds as is, working on diet, started walking and joined a program with her insurance a few weeks ago, trying to monitor protein intake, notes urine has smelled like glucose   Had tahbso since last seen , still recovering, feels tired.  States has felt tired for 18 mos. Snores some.  When anemia resolved, if not feeling better recommend we consider a sleep study.  Hb was 8.9 3/29 and is up to 11.3  Anemia, improving, was felt due to heavy menses and is normalizing.  Had colonoscopy 2022.  Hyperlipidemia on statin  GERD stable  Review of Systems   All other systems reviewed and are negative.  .    Current Outpatient Medications:     atorvastatin (Lipitor) 40 mg tablet, TAKE ONE TABLET BY MOUTH ONCE DAILY, Disp: 90 tablet, Rfl: 1    calcium carbonate (Tums) 200 mg calcium chewable tablet, Chew 2 tablets (1,000 mg) once daily., Disp: , Rfl:     cetirizine (ZyrTEC) 10 mg tablet, Take 1 tablet (10 mg) by mouth once daily as needed for allergies., Disp: 30 tablet, Rfl: 11    cyanocobalamin (Vitamin B-12) 1,000 mcg tablet, Take 100 mcg by mouth once daily., Disp: , Rfl:     flash glucose sensor kit (FreeStyle Marciano 14 Day Sensor) kit, Inject 1 each under the skin every 14 (fourteen) days., Disp: 2 each, Rfl: 5    fluticasone (Flonase) 50 mcg/actuation nasal spray, Administer 2 sprays into each nostril once daily. Shake gently. Before first use, prime pump. After use, clean tip and replace cap., Disp: , Rfl:     glimepiride (Amaryl) 1 mg tablet, TAKE ONE TABLET BY MOUTH ONCE DAILY, Disp: 90 tablet, Rfl: 1    losartan (Cozaar) 25 mg tablet, TAKE ONE TABLET BY MOUTH ONCE DAILY AS DIRECTED, Disp: 90 tablet, Rfl: 1    metFORMIN  mg 24 hr tablet, TAKE FOUR TABLETS BY MOUTH ONCE DAILY (Patient taking differently: Take  2 tablets (1,000 mg) by mouth 2 times daily (morning and late afternoon).), Disp: 360 tablet, Rfl: 1    montelukast (Singulair) 10 mg tablet, TAKE ONE TABLET BY MOUTH ONCE DAILY, Disp: 90 tablet, Rfl: 1    multivitamin-children's (Cerovite, Jr) chewable tablet, Chew 1 tablet once daily., Disp: , Rfl:     omeprazole (PriLOSEC) 40 mg DR capsule, TAKE ONE CAPSULE BY MOUTH ONCE DAILY, Disp: 90 capsule, Rfl: 1    oxyCODONE (Oxy-IR) 5 mg immediate release capsule, Take 1 capsule (5 mg) by mouth every 6 hours if needed for severe pain (7 - 10)., Disp: 15 capsule, Rfl: 0   Patient Active Problem List   Diagnosis    Hiatal hernia with GERD    History of depression    Hyperlipidemia    Seasonal allergies    Type 2 diabetes mellitus with hyperglycemia, without long-term current use of insulin (Multi)    History of esophageal stricture    Personal history of affective disorder      Objective     Visit Vitals  /82   Pulse 103      Physical Exam  Vitals reviewed.   Constitutional:       Appearance: Normal appearance.   Cardiovascular:      Rate and Rhythm: Normal rate and regular rhythm.   Pulmonary:      Effort: Pulmonary effort is normal.      Breath sounds: Normal breath sounds.   Skin:     Coloration: Skin is not pale.   Neurological:      General: No focal deficit present.      Mental Status: She is alert.   Psychiatric:         Mood and Affect: Mood normal.       Lab on 06/11/2024   Component Date Value Ref Range Status    WBC 06/11/2024 8.3  4.4 - 11.3 x10*3/uL Final    nRBC 06/11/2024 0.0  0.0 - 0.0 /100 WBCs Final    RBC 06/11/2024 4.96  4.00 - 5.20 x10*6/uL Final    Hemoglobin 06/11/2024 11.3 (L)  12.0 - 16.0 g/dL Final    Hematocrit 06/11/2024 39.3  36.0 - 46.0 % Final    MCV 06/11/2024 79 (L)  80 - 100 fL Final    MCH 06/11/2024 22.8 (L)  26.0 - 34.0 pg Final    MCHC 06/11/2024 28.8 (L)  32.0 - 36.0 g/dL Final    RDW 06/11/2024 15.1 (H)  11.5 - 14.5 % Final    Platelets 06/11/2024 336  150 - 450 x10*3/uL Final     Neutrophils % 06/11/2024 62.9  40.0 - 80.0 % Final    Immature Granulocytes %, Automated 06/11/2024 0.5  0.0 - 0.9 % Final    Immature Granulocyte Count (IG) includes promyelocytes, myelocytes and metamyelocytes but does not include bands. Percent differential counts (%) should be interpreted in the context of the absolute cell counts (cells/UL).    Lymphocytes % 06/11/2024 24.2  13.0 - 44.0 % Final    Monocytes % 06/11/2024 4.8  2.0 - 10.0 % Final    Eosinophils % 06/11/2024 7.1  0.0 - 6.0 % Final    Basophils % 06/11/2024 0.5  0.0 - 2.0 % Final    Neutrophils Absolute 06/11/2024 5.24  1.20 - 7.70 x10*3/uL Final    Percent differential counts (%) should be interpreted in the context of the absolute cell counts (cells/uL).    Immature Granulocytes Absolute, Au* 06/11/2024 0.04  0.00 - 0.70 x10*3/uL Final    Lymphocytes Absolute 06/11/2024 2.01  1.20 - 4.80 x10*3/uL Final    Monocytes Absolute 06/11/2024 0.40  0.10 - 1.00 x10*3/uL Final    Eosinophils Absolute 06/11/2024 0.59  0.00 - 0.70 x10*3/uL Final    Basophils Absolute 06/11/2024 0.04  0.00 - 0.10 x10*3/uL Final    Iron 06/11/2024 31 (L)  35 - 150 ug/dL Final    UIBC 06/11/2024 387 (H)  110 - 370 ug/dL Final    TIBC 06/11/2024 418  240 - 445 ug/dL Final    % Saturation 06/11/2024 7 (L)  25 - 45 % Final    Hemoglobin A1C 06/11/2024 7.3 (H)  see below % Final    Estimated Average Glucose 06/11/2024 163  Not Established mg/dL Final    Glucose 06/11/2024 149 (H)  74 - 99 mg/dL Final    Sodium 06/11/2024 140  136 - 145 mmol/L Final    Potassium 06/11/2024 4.5  3.5 - 5.3 mmol/L Final    Chloride 06/11/2024 105  98 - 107 mmol/L Final    Bicarbonate 06/11/2024 26  21 - 32 mmol/L Final    Anion Gap 06/11/2024 14  10 - 20 mmol/L Final    Urea Nitrogen 06/11/2024 15  6 - 23 mg/dL Final    Creatinine 06/11/2024 0.71  0.50 - 1.05 mg/dL Final    eGFR 06/11/2024 >90  >60 mL/min/1.73m*2 Final    Calculations of estimated GFR are performed using the 2021 CKD-EPI Study Refit  equation without the race variable for the IDMS-Traceable creatinine methods.  https://jasn.asnjournals.org/content/early/2021/09/22/ASN.8467640689    Calcium 06/11/2024 8.9  8.6 - 10.3 mg/dL Final        Assessment/Plan   Problem List Items Addressed This Visit       Hyperlipidemia    Seasonal allergies    Type 2 diabetes mellitus with hyperglycemia, without long-term current use of insulin (Multi) - Primary    Relevant Orders    CBC and Auto Differential    Comprehensive Metabolic Panel    Hemoglobin A1C    Iron and TIBC          As per hpi, call concerns, followup 3 mos, labs prior.     Lisandra Denney MD

## 2024-08-08 DIAGNOSIS — J30.2 SEASONAL ALLERGIES: ICD-10-CM

## 2024-08-08 RX ORDER — MONTELUKAST SODIUM 10 MG/1
10 TABLET ORAL DAILY
Qty: 90 TABLET | Refills: 1 | Status: SHIPPED | OUTPATIENT
Start: 2024-08-08

## 2024-08-16 ENCOUNTER — HOSPITAL ENCOUNTER (OUTPATIENT)
Dept: RADIOLOGY | Facility: HOSPITAL | Age: 49
Discharge: HOME | End: 2024-08-16
Payer: COMMERCIAL

## 2024-08-16 VITALS — WEIGHT: 193 LBS | HEIGHT: 65 IN | BODY MASS INDEX: 32.15 KG/M2

## 2024-08-16 DIAGNOSIS — Z12.31 ENCOUNTER FOR SCREENING MAMMOGRAM FOR MALIGNANT NEOPLASM OF BREAST: ICD-10-CM

## 2024-08-16 PROCEDURE — 77063 BREAST TOMOSYNTHESIS BI: CPT | Performed by: RADIOLOGY

## 2024-08-16 PROCEDURE — 77067 SCR MAMMO BI INCL CAD: CPT

## 2024-08-16 PROCEDURE — 77067 SCR MAMMO BI INCL CAD: CPT | Performed by: RADIOLOGY

## 2024-08-22 ENCOUNTER — OFFICE VISIT (OUTPATIENT)
Age: 49
End: 2024-08-22
Payer: COMMERCIAL

## 2024-08-22 VITALS
HEART RATE: 96 BPM | HEIGHT: 65 IN | DIASTOLIC BLOOD PRESSURE: 80 MMHG | SYSTOLIC BLOOD PRESSURE: 130 MMHG | BODY MASS INDEX: 31.32 KG/M2 | WEIGHT: 188 LBS

## 2024-08-22 DIAGNOSIS — J30.2 SEASONAL ALLERGIES: Primary | ICD-10-CM

## 2024-08-22 DIAGNOSIS — Z87.898 HISTORY OF WHEEZING: ICD-10-CM

## 2024-08-22 PROCEDURE — 3079F DIAST BP 80-89 MM HG: CPT | Performed by: FAMILY MEDICINE

## 2024-08-22 PROCEDURE — 3048F LDL-C <100 MG/DL: CPT | Performed by: FAMILY MEDICINE

## 2024-08-22 PROCEDURE — 4010F ACE/ARB THERAPY RXD/TAKEN: CPT | Performed by: FAMILY MEDICINE

## 2024-08-22 PROCEDURE — 99213 OFFICE O/P EST LOW 20 MIN: CPT | Performed by: FAMILY MEDICINE

## 2024-08-22 PROCEDURE — 3008F BODY MASS INDEX DOCD: CPT | Performed by: FAMILY MEDICINE

## 2024-08-22 PROCEDURE — 3051F HG A1C>EQUAL 7.0%<8.0%: CPT | Performed by: FAMILY MEDICINE

## 2024-08-22 PROCEDURE — 1036F TOBACCO NON-USER: CPT | Performed by: FAMILY MEDICINE

## 2024-08-22 PROCEDURE — 3075F SYST BP GE 130 - 139MM HG: CPT | Performed by: FAMILY MEDICINE

## 2024-08-22 RX ORDER — PSEUDOEPHEDRINE HCL, GUAIFENESIN 375; 60 MG/1; MG/1
1 TABLET ORAL 2 TIMES DAILY PRN
Qty: 60 TABLET | Refills: 1 | Status: SHIPPED | OUTPATIENT
Start: 2024-08-22

## 2024-08-22 RX ORDER — FLUTICASONE PROPIONATE 110 UG/1
1 AEROSOL, METERED RESPIRATORY (INHALATION)
Qty: 12 G | Refills: 1 | Status: SHIPPED | OUTPATIENT
Start: 2024-08-22 | End: 2025-08-22

## 2024-08-22 NOTE — PROGRESS NOTES
Subjective  Nubia Martinez is a 49 y.o. female who presents for Cough.  HPI  Started last Friday with cough, worse at night when she lies flat.  Runny nose, watery eyes, eyes don't itch.  Eyes crusting over night the last two nights.  Feels short of breath intermittently.   Took two covid tests at home and those were negative.  Feels postnasal drainage.  On flonase, and singulair, and zyrtec.  Using dayquil and nyquil which help some.  Appetite diminished.  Using rescue inhaler, used it twice yesterday.  Had issues in past with oral prednisone elevating her glucoses.  Review of Systems   All other systems reviewed and are negative.  .    Objective     Visit Vitals  /80 (BP Location: Left arm, Patient Position: Sitting)   Pulse 96      Physical Exam  Vitals reviewed.   Constitutional:       Appearance: She is not ill-appearing or toxic-appearing.   HENT:      Head: Normocephalic.      Right Ear: Tympanic membrane normal.      Left Ear: Tympanic membrane normal.      Ears:      Comments: Left serous changes     Nose: Congestion present.      Mouth/Throat:      Comments: Clear drainage, cobblestoning  Eyes:      Conjunctiva/sclera: Conjunctivae normal.   Pulmonary:      Effort: Pulmonary effort is normal. No respiratory distress.      Breath sounds: Normal breath sounds. No stridor. No wheezing, rhonchi or rales.   Neurological:      General: No focal deficit present.      Mental Status: She is alert. Mental status is at baseline.   Psychiatric:         Mood and Affect: Mood normal.         Assessment/Plan   Problem List Items Addressed This Visit       Seasonal allergies - Primary    Relevant Medications    fluticasone (Flovent) 110 mcg/actuation inhaler     Other Visit Diagnoses       History of wheezing        Relevant Medications    fluticasone (Flovent) 110 mcg/actuation inhaler    pseudoephedrine-guaifenesin  mg tablet          I think this is allergies, less likely viral.  Do not feel bacterial.   Recommend treat symptoms, side effects discussed and call concerns.         Lisandra Denney MD

## 2024-08-22 NOTE — PROGRESS NOTES
Sore throat started 6 days ago; then got watery eyes. Cough started about 4-5 days; worse over the past 2 days.

## 2024-09-17 ENCOUNTER — LAB (OUTPATIENT)
Dept: LAB | Facility: LAB | Age: 49
End: 2024-09-17
Payer: COMMERCIAL

## 2024-09-17 DIAGNOSIS — E11.65 TYPE 2 DIABETES MELLITUS WITH HYPERGLYCEMIA, WITHOUT LONG-TERM CURRENT USE OF INSULIN: ICD-10-CM

## 2024-09-17 LAB
ALBUMIN SERPL BCP-MCNC: 4.4 G/DL (ref 3.4–5)
ALP SERPL-CCNC: 76 U/L (ref 33–110)
ALT SERPL W P-5'-P-CCNC: 73 U/L (ref 7–45)
ANION GAP SERPL CALC-SCNC: 15 MMOL/L (ref 10–20)
AST SERPL W P-5'-P-CCNC: 33 U/L (ref 9–39)
BASOPHILS # BLD AUTO: 0.06 X10*3/UL (ref 0–0.1)
BASOPHILS NFR BLD AUTO: 0.7 %
BILIRUB SERPL-MCNC: 0.4 MG/DL (ref 0–1.2)
BUN SERPL-MCNC: 16 MG/DL (ref 6–23)
CALCIUM SERPL-MCNC: 9.7 MG/DL (ref 8.6–10.3)
CHLORIDE SERPL-SCNC: 104 MMOL/L (ref 98–107)
CO2 SERPL-SCNC: 27 MMOL/L (ref 21–32)
CREAT SERPL-MCNC: 0.88 MG/DL (ref 0.5–1.05)
EGFRCR SERPLBLD CKD-EPI 2021: 80 ML/MIN/1.73M*2
EOSINOPHIL # BLD AUTO: 0.56 X10*3/UL (ref 0–0.7)
EOSINOPHIL NFR BLD AUTO: 6.3 %
ERYTHROCYTE [DISTWIDTH] IN BLOOD BY AUTOMATED COUNT: 16.5 % (ref 11.5–14.5)
GLUCOSE SERPL-MCNC: 159 MG/DL (ref 74–99)
HCT VFR BLD AUTO: 41.2 % (ref 36–46)
HGB BLD-MCNC: 12.2 G/DL (ref 12–16)
IMM GRANULOCYTES # BLD AUTO: 0.03 X10*3/UL (ref 0–0.7)
IMM GRANULOCYTES NFR BLD AUTO: 0.3 % (ref 0–0.9)
IRON SATN MFR SERPL: 9 % (ref 25–45)
IRON SERPL-MCNC: 38 UG/DL (ref 35–150)
LYMPHOCYTES # BLD AUTO: 2.57 X10*3/UL (ref 1.2–4.8)
LYMPHOCYTES NFR BLD AUTO: 28.9 %
MCH RBC QN AUTO: 23.3 PG (ref 26–34)
MCHC RBC AUTO-ENTMCNC: 29.6 G/DL (ref 32–36)
MCV RBC AUTO: 79 FL (ref 80–100)
MONOCYTES # BLD AUTO: 0.51 X10*3/UL (ref 0.1–1)
MONOCYTES NFR BLD AUTO: 5.7 %
NEUTROPHILS # BLD AUTO: 5.17 X10*3/UL (ref 1.2–7.7)
NEUTROPHILS NFR BLD AUTO: 58.1 %
NRBC BLD-RTO: 0 /100 WBCS (ref 0–0)
PLATELET # BLD AUTO: 268 X10*3/UL (ref 150–450)
POTASSIUM SERPL-SCNC: 4.6 MMOL/L (ref 3.5–5.3)
PROT SERPL-MCNC: 6.8 G/DL (ref 6.4–8.2)
RBC # BLD AUTO: 5.24 X10*6/UL (ref 4–5.2)
SODIUM SERPL-SCNC: 141 MMOL/L (ref 136–145)
TIBC SERPL-MCNC: 414 UG/DL (ref 240–445)
UIBC SERPL-MCNC: 376 UG/DL (ref 110–370)
WBC # BLD AUTO: 8.9 X10*3/UL (ref 4.4–11.3)

## 2024-09-17 PROCEDURE — 83036 HEMOGLOBIN GLYCOSYLATED A1C: CPT

## 2024-09-17 PROCEDURE — 83550 IRON BINDING TEST: CPT

## 2024-09-17 PROCEDURE — 83540 ASSAY OF IRON: CPT

## 2024-09-17 PROCEDURE — 36415 COLL VENOUS BLD VENIPUNCTURE: CPT

## 2024-09-17 PROCEDURE — 85025 COMPLETE CBC W/AUTO DIFF WBC: CPT

## 2024-09-17 PROCEDURE — 80053 COMPREHEN METABOLIC PANEL: CPT

## 2024-09-18 LAB
EST. AVERAGE GLUCOSE BLD GHB EST-MCNC: 183 MG/DL
HBA1C MFR BLD: 8 %

## 2024-09-19 ENCOUNTER — APPOINTMENT (OUTPATIENT)
Dept: PRIMARY CARE | Facility: CLINIC | Age: 49
End: 2024-09-19
Payer: COMMERCIAL

## 2024-09-19 VITALS
WEIGHT: 190 LBS | SYSTOLIC BLOOD PRESSURE: 134 MMHG | BODY MASS INDEX: 31.65 KG/M2 | DIASTOLIC BLOOD PRESSURE: 80 MMHG | HEIGHT: 65 IN | HEART RATE: 92 BPM

## 2024-09-19 DIAGNOSIS — E78.49 OTHER HYPERLIPIDEMIA: ICD-10-CM

## 2024-09-19 DIAGNOSIS — E11.65 TYPE 2 DIABETES MELLITUS WITH HYPERGLYCEMIA, WITHOUT LONG-TERM CURRENT USE OF INSULIN: ICD-10-CM

## 2024-09-19 DIAGNOSIS — J30.2 SEASONAL ALLERGIES: ICD-10-CM

## 2024-09-19 DIAGNOSIS — Z23 NEED FOR INFLUENZA VACCINATION: Primary | ICD-10-CM

## 2024-09-19 PROCEDURE — 3048F LDL-C <100 MG/DL: CPT | Performed by: FAMILY MEDICINE

## 2024-09-19 PROCEDURE — 3075F SYST BP GE 130 - 139MM HG: CPT | Performed by: FAMILY MEDICINE

## 2024-09-19 PROCEDURE — 3052F HG A1C>EQUAL 8.0%<EQUAL 9.0%: CPT | Performed by: FAMILY MEDICINE

## 2024-09-19 PROCEDURE — 1036F TOBACCO NON-USER: CPT | Performed by: FAMILY MEDICINE

## 2024-09-19 PROCEDURE — 90471 IMMUNIZATION ADMIN: CPT | Performed by: FAMILY MEDICINE

## 2024-09-19 PROCEDURE — 99214 OFFICE O/P EST MOD 30 MIN: CPT | Performed by: FAMILY MEDICINE

## 2024-09-19 PROCEDURE — 3008F BODY MASS INDEX DOCD: CPT | Performed by: FAMILY MEDICINE

## 2024-09-19 PROCEDURE — 4010F ACE/ARB THERAPY RXD/TAKEN: CPT | Performed by: FAMILY MEDICINE

## 2024-09-19 PROCEDURE — 3079F DIAST BP 80-89 MM HG: CPT | Performed by: FAMILY MEDICINE

## 2024-09-19 PROCEDURE — 90673 RIV3 VACCINE NO PRESERV IM: CPT | Performed by: FAMILY MEDICINE

## 2024-09-19 NOTE — LETTER
To whom it may concern,    Nubia Martinez,  1975 ,was seen today for routine check up with labs.  We've completed her Proof of annual physical form through  [employer].  She was provided with a copy of her recent labs and the signed [employer] form.    Nubia is requesting a letter with her additional biometric information, and that is as follows    BMI 31  Blood pressure 134/80    Most recent lipid panel was from 3/18/24  Total cholesterol 144  Triglycerides -269    Sincerely,        Lisandra Denney MD

## 2024-09-19 NOTE — PROGRESS NOTES
Patient presents for periodic surveillance of chronic medical problems.     Subjective  Nubia Martinez is a 49 y.o. female who presents for Follow-up.  HPI  Dm, A1c up to 8, discussed addition of jardiance, side effects benefits,if works out at [facility], consider increasing from 10 to 25 and possibly at that point in the future dc the amaryl, this elevated glucose is the likely reason for elevated alt will monitor  Anemia, s/p hysterectomy , improving, will monitor  Gerd, seasonal allergies, stable  Review of Systems   All other systems reviewed and are negative.  .    Allergies   Allergen Reactions    Prednisone Other     Elevated blood sugar       Current Outpatient Medications on File Prior to Visit   Medication Sig Dispense Refill    atorvastatin (Lipitor) 40 mg tablet TAKE ONE TABLET BY MOUTH ONCE DAILY 90 tablet 1    calcium carbonate (Tums) 200 mg calcium chewable tablet Chew 2 tablets (1,000 mg) once daily.      cetirizine (ZyrTEC) 10 mg tablet Take 1 tablet (10 mg) by mouth once daily as needed for allergies. 30 tablet 11    flash glucose sensor kit (FreeStyle Marciano 14 Day Sensor) kit Inject 1 each under the skin every 14 (fourteen) days. 2 each 5    fluticasone (Flonase) 50 mcg/actuation nasal spray Administer 2 sprays into each nostril once daily. Shake gently. Before first use, prime pump. After use, clean tip and replace cap.      fluticasone (Flovent) 110 mcg/actuation inhaler Inhale 1 puff 2 times a day. Rinse mouth with water after use to reduce aftertaste and incidence of candidiasis. Do not swallow. 12 g 1    glimepiride (Amaryl) 1 mg tablet TAKE ONE TABLET BY MOUTH ONCE DAILY 90 tablet 1    losartan (Cozaar) 25 mg tablet TAKE ONE TABLET BY MOUTH ONCE DAILY AS DIRECTED 90 tablet 1    metFORMIN  mg 24 hr tablet TAKE FOUR TABLETS BY MOUTH ONCE DAILY (Patient taking differently: Take 2 tablets (1,000 mg) by mouth 2 times daily (morning and late afternoon).) 360 tablet 1    montelukast (Singulair) 10  mg tablet TAKE ONE TABLET BY MOUTH ONCE DAILY 90 tablet 1    multivitamin-children's (Cerovite, Jr) chewable tablet Chew 1 tablet once daily.      omeprazole (PriLOSEC) 40 mg DR capsule TAKE ONE CAPSULE BY MOUTH ONCE DAILY 90 capsule 1    pseudoephedrine-guaifenesin  mg tablet Take 1 tablet by mouth 2 times a day as needed (cough/cold symptoms). 60 tablet 1     No current facility-administered medications on file prior to visit.       Patient Active Problem List   Diagnosis    Hiatal hernia with GERD    History of depression    Hyperlipidemia    Seasonal allergies    Type 2 diabetes mellitus with hyperglycemia, without long-term current use of insulin (Multi)    History of esophageal stricture    Personal history of affective disorder       Objective     Visit Vitals  /80 (BP Location: Left arm, Patient Position: Sitting)   Pulse 92      Physical Exam  HENT:      Head: Normocephalic.   Cardiovascular:      Rate and Rhythm: Normal rate and regular rhythm.   Pulmonary:      Effort: Pulmonary effort is normal.      Breath sounds: Normal breath sounds.   Neurological:      General: No focal deficit present.      Mental Status: She is alert and oriented to person, place, and time.   Psychiatric:         Mood and Affect: Mood normal.       Lab on 09/17/2024   Component Date Value Ref Range Status    WBC 09/17/2024 8.9  4.4 - 11.3 x10*3/uL Final    nRBC 09/17/2024 0.0  0.0 - 0.0 /100 WBCs Final    RBC 09/17/2024 5.24 (H)  4.00 - 5.20 x10*6/uL Final    Hemoglobin 09/17/2024 12.2  12.0 - 16.0 g/dL Final    Hematocrit 09/17/2024 41.2  36.0 - 46.0 % Final    MCV 09/17/2024 79 (L)  80 - 100 fL Final    MCH 09/17/2024 23.3 (L)  26.0 - 34.0 pg Final    MCHC 09/17/2024 29.6 (L)  32.0 - 36.0 g/dL Final    RDW 09/17/2024 16.5 (H)  11.5 - 14.5 % Final    Platelets 09/17/2024 268  150 - 450 x10*3/uL Final    Neutrophils % 09/17/2024 58.1  40.0 - 80.0 % Final    Immature Granulocytes %, Automated 09/17/2024 0.3  0.0 - 0.9 %  Final    Immature Granulocyte Count (IG) includes promyelocytes, myelocytes and metamyelocytes but does not include bands. Percent differential counts (%) should be interpreted in the context of the absolute cell counts (cells/UL).    Lymphocytes % 09/17/2024 28.9  13.0 - 44.0 % Final    Monocytes % 09/17/2024 5.7  2.0 - 10.0 % Final    Eosinophils % 09/17/2024 6.3  0.0 - 6.0 % Final    Basophils % 09/17/2024 0.7  0.0 - 2.0 % Final    Neutrophils Absolute 09/17/2024 5.17  1.20 - 7.70 x10*3/uL Final    Percent differential counts (%) should be interpreted in the context of the absolute cell counts (cells/uL).    Immature Granulocytes Absolute, Au* 09/17/2024 0.03  0.00 - 0.70 x10*3/uL Final    Lymphocytes Absolute 09/17/2024 2.57  1.20 - 4.80 x10*3/uL Final    Monocytes Absolute 09/17/2024 0.51  0.10 - 1.00 x10*3/uL Final    Eosinophils Absolute 09/17/2024 0.56  0.00 - 0.70 x10*3/uL Final    Basophils Absolute 09/17/2024 0.06  0.00 - 0.10 x10*3/uL Final    Glucose 09/17/2024 159 (H)  74 - 99 mg/dL Final    Sodium 09/17/2024 141  136 - 145 mmol/L Final    Potassium 09/17/2024 4.6  3.5 - 5.3 mmol/L Final    Chloride 09/17/2024 104  98 - 107 mmol/L Final    Bicarbonate 09/17/2024 27  21 - 32 mmol/L Final    Anion Gap 09/17/2024 15  10 - 20 mmol/L Final    Urea Nitrogen 09/17/2024 16  6 - 23 mg/dL Final    Creatinine 09/17/2024 0.88  0.50 - 1.05 mg/dL Final    eGFR 09/17/2024 80  >60 mL/min/1.73m*2 Final    Calculations of estimated GFR are performed using the 2021 CKD-EPI Study Refit equation without the race variable for the IDMS-Traceable creatinine methods.  https://jasn.asnjournals.org/content/early/2021/09/22/ASN.0037423969    Calcium 09/17/2024 9.7  8.6 - 10.3 mg/dL Final    Albumin 09/17/2024 4.4  3.4 - 5.0 g/dL Final    Alkaline Phosphatase 09/17/2024 76  33 - 110 U/L Final    Total Protein 09/17/2024 6.8  6.4 - 8.2 g/dL Final    AST 09/17/2024 33  9 - 39 U/L Final    Bilirubin, Total 09/17/2024 0.4  0.0 - 1.2  mg/dL Final    ALT 09/17/2024 73 (H)  7 - 45 U/L Final    Patients treated with Sulfasalazine may generate falsely decreased results for ALT.    Hemoglobin A1C 09/17/2024 8.0 (H)  See comment % Final    Estimated Average Glucose 09/17/2024 183  Not Established mg/dL Final    Iron 09/17/2024 38  35 - 150 ug/dL Final    UIBC 09/17/2024 376 (H)  110 - 370 ug/dL Final    TIBC 09/17/2024 414  240 - 445 ug/dL Final    % Saturation 09/17/2024 9 (L)  25 - 45 % Final         Assessment/Plan   Problem List Items Addressed This Visit       Hyperlipidemia    Seasonal allergies    Type 2 diabetes mellitus with hyperglycemia, without long-term current use of insulin (Multi)    Relevant Medications    empagliflozin (Jardiance) 10 mg    Other Relevant Orders    CBC and Auto Differential    Comprehensive Metabolic Panel    Lipid Panel    TSH with reflex to Free T4 if abnormal    Microscopic Only, Urine    Albumin-Creatinine Ratio, Urine Random    Hemoglobin A1C    Iron and TIBC     Other Visit Diagnoses       Need for influenza vaccination    -  Primary    Relevant Orders    Flu vaccine, trivalent, preservative free, no egg protein, age 18y+ (Flublok) (Completed)          Add Jardiance 10 mg, followup 3 mos, labs prior, call concerns.,           Lisandra Denney MD

## 2024-09-25 ENCOUNTER — TELEPHONE (OUTPATIENT)
Age: 49
End: 2024-09-25
Payer: COMMERCIAL

## 2024-09-25 DIAGNOSIS — E11.65 TYPE 2 DIABETES MELLITUS WITH HYPERGLYCEMIA, WITHOUT LONG-TERM CURRENT USE OF INSULIN: Primary | ICD-10-CM

## 2024-09-25 NOTE — TELEPHONE ENCOUNTER
Can you let Nubia know her insurance will not cover jardiance, but they will cover steglatro which is in the same class for her, if okay I will send that in for her.

## 2024-10-04 DIAGNOSIS — E11.65 TYPE 2 DIABETES MELLITUS WITH HYPERGLYCEMIA, WITHOUT LONG-TERM CURRENT USE OF INSULIN: ICD-10-CM

## 2024-10-04 DIAGNOSIS — I10 HYPERTENSION, UNSPECIFIED TYPE: ICD-10-CM

## 2024-10-04 DIAGNOSIS — Z00.00 HEALTHCARE MAINTENANCE: ICD-10-CM

## 2024-10-04 RX ORDER — LOSARTAN POTASSIUM 25 MG/1
25 TABLET ORAL DAILY
Qty: 90 TABLET | Refills: 1 | Status: SHIPPED | OUTPATIENT
Start: 2024-10-04

## 2024-10-04 RX ORDER — METFORMIN HYDROCHLORIDE 500 MG/1
1000 TABLET, EXTENDED RELEASE ORAL
Qty: 120 TABLET | Refills: 0 | Status: SHIPPED | OUTPATIENT
Start: 2024-10-04 | End: 2024-11-03

## 2024-10-04 RX ORDER — ATORVASTATIN CALCIUM 40 MG/1
40 TABLET, FILM COATED ORAL DAILY
Qty: 90 TABLET | Refills: 1 | Status: SHIPPED | OUTPATIENT
Start: 2024-10-04

## 2024-10-04 RX ORDER — OMEPRAZOLE 40 MG/1
40 CAPSULE, DELAYED RELEASE ORAL DAILY
Qty: 90 CAPSULE | Refills: 1 | Status: SHIPPED | OUTPATIENT
Start: 2024-10-04

## 2024-10-08 DIAGNOSIS — E11.65 TYPE 2 DIABETES MELLITUS WITH HYPERGLYCEMIA, WITHOUT LONG-TERM CURRENT USE OF INSULIN: ICD-10-CM

## 2024-10-08 RX ORDER — METFORMIN HYDROCHLORIDE 500 MG/1
1000 TABLET, EXTENDED RELEASE ORAL
Qty: 360 TABLET | Refills: 1 | Status: SHIPPED | OUTPATIENT
Start: 2024-10-08 | End: 2025-10-08

## 2024-10-25 DIAGNOSIS — E11.65 TYPE 2 DIABETES MELLITUS WITH HYPERGLYCEMIA, WITHOUT LONG-TERM CURRENT USE OF INSULIN: ICD-10-CM

## 2024-10-25 RX ORDER — METFORMIN HYDROCHLORIDE 500 MG/1
1000 TABLET, EXTENDED RELEASE ORAL
Qty: 360 TABLET | Refills: 1 | Status: SHIPPED | OUTPATIENT
Start: 2024-10-25 | End: 2025-10-25

## 2024-12-17 ENCOUNTER — LAB (OUTPATIENT)
Facility: LAB | Age: 49
End: 2024-12-17
Payer: COMMERCIAL

## 2024-12-17 DIAGNOSIS — E11.65 TYPE 2 DIABETES MELLITUS WITH HYPERGLYCEMIA, WITHOUT LONG-TERM CURRENT USE OF INSULIN: ICD-10-CM

## 2024-12-17 LAB
ALBUMIN SERPL BCP-MCNC: 4.4 G/DL (ref 3.4–5)
ALP SERPL-CCNC: 93 U/L (ref 33–110)
ALT SERPL W P-5'-P-CCNC: 59 U/L (ref 7–45)
ANION GAP SERPL CALC-SCNC: 14 MMOL/L (ref 10–20)
AST SERPL W P-5'-P-CCNC: 24 U/L (ref 9–39)
BACTERIA #/AREA URNS AUTO: ABNORMAL /HPF
BASOPHILS # BLD AUTO: 0.05 X10*3/UL (ref 0–0.1)
BASOPHILS NFR BLD AUTO: 0.6 %
BILIRUB SERPL-MCNC: 0.4 MG/DL (ref 0–1.2)
BUN SERPL-MCNC: 18 MG/DL (ref 6–23)
CALCIUM SERPL-MCNC: 9.5 MG/DL (ref 8.6–10.3)
CHLORIDE SERPL-SCNC: 105 MMOL/L (ref 98–107)
CHOLEST SERPL-MCNC: 168 MG/DL (ref 0–199)
CHOLESTEROL/HDL RATIO: 4.2
CO2 SERPL-SCNC: 26 MMOL/L (ref 21–32)
CREAT SERPL-MCNC: 0.76 MG/DL (ref 0.5–1.05)
EGFRCR SERPLBLD CKD-EPI 2021: >90 ML/MIN/1.73M*2
EOSINOPHIL # BLD AUTO: 0.49 X10*3/UL (ref 0–0.7)
EOSINOPHIL NFR BLD AUTO: 5.7 %
ERYTHROCYTE [DISTWIDTH] IN BLOOD BY AUTOMATED COUNT: 15.2 % (ref 11.5–14.5)
EST. AVERAGE GLUCOSE BLD GHB EST-MCNC: 157 MG/DL
GLUCOSE SERPL-MCNC: 155 MG/DL (ref 74–99)
HBA1C MFR BLD: 7.1 %
HCT VFR BLD AUTO: 43.3 % (ref 36–46)
HDLC SERPL-MCNC: 40 MG/DL
HGB BLD-MCNC: 12.9 G/DL (ref 12–16)
IMM GRANULOCYTES # BLD AUTO: 0.03 X10*3/UL (ref 0–0.7)
IMM GRANULOCYTES NFR BLD AUTO: 0.4 % (ref 0–0.9)
IRON SATN MFR SERPL: 12 % (ref 25–45)
IRON SERPL-MCNC: 51 UG/DL (ref 35–150)
LDLC SERPL CALC-MCNC: 72 MG/DL
LYMPHOCYTES # BLD AUTO: 2.44 X10*3/UL (ref 1.2–4.8)
LYMPHOCYTES NFR BLD AUTO: 28.5 %
MCH RBC QN AUTO: 23.9 PG (ref 26–34)
MCHC RBC AUTO-ENTMCNC: 29.8 G/DL (ref 32–36)
MCV RBC AUTO: 80 FL (ref 80–100)
MONOCYTES # BLD AUTO: 0.42 X10*3/UL (ref 0.1–1)
MONOCYTES NFR BLD AUTO: 4.9 %
MUCOUS THREADS #/AREA URNS AUTO: ABNORMAL /LPF
NEUTROPHILS # BLD AUTO: 5.13 X10*3/UL (ref 1.2–7.7)
NEUTROPHILS NFR BLD AUTO: 59.9 %
NON HDL CHOLESTEROL: 128 MG/DL (ref 0–149)
NRBC BLD-RTO: 0 /100 WBCS (ref 0–0)
PLATELET # BLD AUTO: 265 X10*3/UL (ref 150–450)
POTASSIUM SERPL-SCNC: 4.3 MMOL/L (ref 3.5–5.3)
PROT SERPL-MCNC: 6.6 G/DL (ref 6.4–8.2)
RBC # BLD AUTO: 5.39 X10*6/UL (ref 4–5.2)
RBC #/AREA URNS AUTO: ABNORMAL /HPF
SODIUM SERPL-SCNC: 141 MMOL/L (ref 136–145)
SQUAMOUS #/AREA URNS AUTO: ABNORMAL /HPF
TIBC SERPL-MCNC: 437 UG/DL (ref 240–445)
TRIGL SERPL-MCNC: 281 MG/DL (ref 0–149)
TSH SERPL-ACNC: 2.2 MIU/L (ref 0.44–3.98)
UIBC SERPL-MCNC: 386 UG/DL (ref 110–370)
VLDL: 56 MG/DL (ref 0–40)
WBC # BLD AUTO: 8.6 X10*3/UL (ref 4.4–11.3)
WBC #/AREA URNS AUTO: ABNORMAL /HPF

## 2024-12-17 PROCEDURE — 83550 IRON BINDING TEST: CPT

## 2024-12-17 PROCEDURE — 82043 UR ALBUMIN QUANTITATIVE: CPT

## 2024-12-17 PROCEDURE — 81001 URINALYSIS AUTO W/SCOPE: CPT

## 2024-12-17 PROCEDURE — 80053 COMPREHEN METABOLIC PANEL: CPT

## 2024-12-17 PROCEDURE — 84443 ASSAY THYROID STIM HORMONE: CPT

## 2024-12-17 PROCEDURE — 83036 HEMOGLOBIN GLYCOSYLATED A1C: CPT

## 2024-12-17 PROCEDURE — 82570 ASSAY OF URINE CREATININE: CPT

## 2024-12-17 PROCEDURE — 83540 ASSAY OF IRON: CPT

## 2024-12-17 PROCEDURE — 85025 COMPLETE CBC W/AUTO DIFF WBC: CPT

## 2024-12-17 PROCEDURE — 80061 LIPID PANEL: CPT

## 2024-12-17 PROCEDURE — 36415 COLL VENOUS BLD VENIPUNCTURE: CPT

## 2024-12-18 LAB
CREAT UR-MCNC: 69.7 MG/DL (ref 20–320)
MICROALBUMIN UR-MCNC: <7 MG/L
MICROALBUMIN/CREAT UR: NORMAL MG/G{CREAT}

## 2024-12-19 ENCOUNTER — APPOINTMENT (OUTPATIENT)
Age: 49
End: 2024-12-19
Payer: COMMERCIAL

## 2024-12-19 ENCOUNTER — HOSPITAL ENCOUNTER (OUTPATIENT)
Dept: RADIOLOGY | Facility: HOSPITAL | Age: 49
Discharge: HOME | End: 2024-12-19
Payer: COMMERCIAL

## 2024-12-19 VITALS
DIASTOLIC BLOOD PRESSURE: 80 MMHG | HEIGHT: 65 IN | BODY MASS INDEX: 30.82 KG/M2 | WEIGHT: 185 LBS | HEART RATE: 88 BPM | SYSTOLIC BLOOD PRESSURE: 124 MMHG

## 2024-12-19 DIAGNOSIS — M25.562 CHRONIC PAIN OF BOTH KNEES: ICD-10-CM

## 2024-12-19 DIAGNOSIS — N94.9 VAGINAL SYMPTOM: ICD-10-CM

## 2024-12-19 DIAGNOSIS — M25.561 CHRONIC PAIN OF BOTH KNEES: Primary | ICD-10-CM

## 2024-12-19 DIAGNOSIS — J02.8 SORE THROAT (VIRAL): ICD-10-CM

## 2024-12-19 DIAGNOSIS — G89.29 CHRONIC PAIN OF BOTH KNEES: Primary | ICD-10-CM

## 2024-12-19 DIAGNOSIS — R74.01 ELEVATED TRANSAMINASE LEVEL: ICD-10-CM

## 2024-12-19 DIAGNOSIS — D50.0 IRON DEFICIENCY ANEMIA DUE TO CHRONIC BLOOD LOSS: ICD-10-CM

## 2024-12-19 DIAGNOSIS — M25.561 CHRONIC PAIN OF BOTH KNEES: ICD-10-CM

## 2024-12-19 DIAGNOSIS — G89.29 CHRONIC PAIN OF BOTH KNEES: ICD-10-CM

## 2024-12-19 DIAGNOSIS — B97.89 SORE THROAT (VIRAL): ICD-10-CM

## 2024-12-19 DIAGNOSIS — E11.65 TYPE 2 DIABETES MELLITUS WITH HYPERGLYCEMIA, WITHOUT LONG-TERM CURRENT USE OF INSULIN: ICD-10-CM

## 2024-12-19 DIAGNOSIS — R31.29 OTHER MICROSCOPIC HEMATURIA: ICD-10-CM

## 2024-12-19 DIAGNOSIS — M25.562 CHRONIC PAIN OF BOTH KNEES: Primary | ICD-10-CM

## 2024-12-19 PROCEDURE — 3008F BODY MASS INDEX DOCD: CPT | Performed by: FAMILY MEDICINE

## 2024-12-19 PROCEDURE — 1036F TOBACCO NON-USER: CPT | Performed by: FAMILY MEDICINE

## 2024-12-19 PROCEDURE — 73564 X-RAY EXAM KNEE 4 OR MORE: CPT | Mod: BILATERAL PROCEDURE | Performed by: STUDENT IN AN ORGANIZED HEALTH CARE EDUCATION/TRAINING PROGRAM

## 2024-12-19 PROCEDURE — 3079F DIAST BP 80-89 MM HG: CPT | Performed by: FAMILY MEDICINE

## 2024-12-19 PROCEDURE — 99214 OFFICE O/P EST MOD 30 MIN: CPT | Performed by: FAMILY MEDICINE

## 2024-12-19 PROCEDURE — 3048F LDL-C <100 MG/DL: CPT | Performed by: FAMILY MEDICINE

## 2024-12-19 PROCEDURE — 4010F ACE/ARB THERAPY RXD/TAKEN: CPT | Performed by: FAMILY MEDICINE

## 2024-12-19 PROCEDURE — 3074F SYST BP LT 130 MM HG: CPT | Performed by: FAMILY MEDICINE

## 2024-12-19 PROCEDURE — 73564 X-RAY EXAM KNEE 4 OR MORE: CPT | Mod: 50

## 2024-12-19 PROCEDURE — 3062F POS MACROALBUMINURIA REV: CPT | Performed by: FAMILY MEDICINE

## 2024-12-19 PROCEDURE — 3051F HG A1C>EQUAL 7.0%<8.0%: CPT | Performed by: FAMILY MEDICINE

## 2024-12-19 RX ORDER — FLUCONAZOLE 150 MG/1
150 TABLET ORAL ONCE
Qty: 1 TABLET | Refills: 1 | Status: SHIPPED | OUTPATIENT
Start: 2024-12-19 | End: 2024-12-19

## 2024-12-19 NOTE — PROGRESS NOTES
Patient presents for periodic surveillance of chronic medical problems.     Subjective  Nubia Martinez is a 49 y.o. female who presents for Follow-up.  HPI  Left knee pain , has had before, and had steroid injections.  Wants to get back to them.    Hysterectomy in March.  Having constant pain in pelvic region.  Left breast pain for a month.  Wants to talk about that to gyn.   Getting yeast infections, using over the counter.  Requests diflucan.  Sore throat started last night, no fevers.     DM, well controlled on current regimen.  States read steglatro can cause yeast infections, we discussed so can diabetes  Elevated transaminase times two, recommend hepatic us and further workup, can get with next labs in 3 mos.  Anemia, improving, s/p hysterectomy, recommend take iron every other day instead of daily  Hyperlipidemia, on statin  Asymptomatic hematuria, recommend repeat  Review of Systems   All other systems reviewed and are negative.  .    Allergies   Allergen Reactions    Prednisone Other     Elevated blood sugar       Current Outpatient Medications on File Prior to Visit   Medication Sig Dispense Refill    atorvastatin (Lipitor) 40 mg tablet Take 1 tablet (40 mg) by mouth once daily. 90 tablet 1    calcium carbonate (Tums) 200 mg calcium chewable tablet Chew 2 tablets (1,000 mg) once daily.      cetirizine (ZyrTEC) 10 mg tablet Take 1 tablet (10 mg) by mouth once daily as needed for allergies. 30 tablet 11    ertugliflozin (Steglatro) Take 1 tablet (5 mg) by mouth once daily. 30 tablet 3    flash glucose sensor kit (FreeStyle Marciano 14 Day Sensor) kit Inject 1 each under the skin every 14 (fourteen) days. 2 each 5    fluticasone (Flonase) 50 mcg/actuation nasal spray Administer 2 sprays into each nostril once daily. Shake gently. Before first use, prime pump. After use, clean tip and replace cap.      fluticasone (Flovent) 110 mcg/actuation inhaler Inhale 1 puff 2 times a day. Rinse mouth with water after use to  reduce aftertaste and incidence of candidiasis. Do not swallow. 12 g 1    glimepiride (Amaryl) 1 mg tablet TAKE ONE TABLET BY MOUTH ONCE DAILY 90 tablet 1    losartan (Cozaar) 25 mg tablet Take 1 tablet (25 mg) by mouth once daily. as directed 90 tablet 1    metFORMIN  mg 24 hr tablet Take 2 tablets (1,000 mg) by mouth 2 times daily (morning and late afternoon). Do not crush, chew, or split. 360 tablet 1    montelukast (Singulair) 10 mg tablet TAKE ONE TABLET BY MOUTH ONCE DAILY 90 tablet 1    multivitamin-children's (Cerovite, Jr) chewable tablet Chew 1 tablet once daily.      omeprazole (PriLOSEC) 40 mg DR capsule Take 1 capsule (40 mg) by mouth once daily. 90 capsule 1    [DISCONTINUED] pseudoephedrine-guaifenesin  mg tablet Take 1 tablet by mouth 2 times a day as needed (cough/cold symptoms). 60 tablet 1     No current facility-administered medications on file prior to visit.       Patient Active Problem List   Diagnosis    Hiatal hernia with GERD    History of depression    Hyperlipidemia    Seasonal allergies    Type 2 diabetes mellitus with hyperglycemia, without long-term current use of insulin    History of esophageal stricture    Personal history of affective disorder       Objective     Visit Vitals  /80 (BP Location: Left arm, Patient Position: Sitting)   Pulse 88      Physical Exam  Vitals and nursing note reviewed.   Constitutional:       General: She is not in acute distress.     Appearance: Normal appearance. She is not toxic-appearing.   HENT:      Head: Normocephalic and atraumatic.      Mouth/Throat:      Comments: Mild erythema of throat, voice normal, no exudate, no lymphadenopathy  Cardiovascular:      Rate and Rhythm: Normal rate and regular rhythm.      Heart sounds: No murmur heard.  Pulmonary:      Effort: Pulmonary effort is normal.      Breath sounds: Normal breath sounds.   Musculoskeletal:      Cervical back: Neck supple. No rigidity.      Comments:     Skin:      General: Skin is warm and dry.   Neurological:      General: No focal deficit present.      Mental Status: She is alert and oriented to person, place, and time.   Psychiatric:         Mood and Affect: Mood normal.         Behavior: Behavior normal.         Assessment/Plan   Problem List Items Addressed This Visit       Type 2 diabetes mellitus with hyperglycemia, without long-term current use of insulin    Relevant Orders    Hemoglobin A1C     Other Visit Diagnoses       Chronic pain of both knees    -  Primary    Relevant Orders    XR knee 4+ views bilateral    Referral to Orthopaedic Surgery    Elevated transaminase level        Relevant Orders    US right upper quadrant    Hepatitis panel, acute    Comprehensive metabolic panel    Antimitochondrial antibody    Vaginal symptom        Relevant Medications    fluconazole (Diflucan) 150 mg tablet    Iron deficiency anemia due to chronic blood loss        Relevant Orders    CBC and Auto Differential    Iron and TIBC    Sore throat (viral)        Other microscopic hematuria        Relevant Orders    Microscopic Only, Urine             Encourage to call concerns as they develop, followup 3 mos, labs prior.      Lisandra Denney MD

## 2024-12-20 ENCOUNTER — HOSPITAL ENCOUNTER (OUTPATIENT)
Dept: RADIOLOGY | Facility: HOSPITAL | Age: 49
Discharge: HOME | End: 2024-12-20
Payer: COMMERCIAL

## 2024-12-20 DIAGNOSIS — R74.01 ELEVATED TRANSAMINASE LEVEL: ICD-10-CM

## 2024-12-20 PROCEDURE — 76705 ECHO EXAM OF ABDOMEN: CPT

## 2024-12-23 ENCOUNTER — APPOINTMENT (OUTPATIENT)
Dept: OBSTETRICS AND GYNECOLOGY | Facility: CLINIC | Age: 49
End: 2024-12-23
Payer: COMMERCIAL

## 2025-01-07 DIAGNOSIS — J30.2 SEASONAL ALLERGIES: ICD-10-CM

## 2025-01-07 RX ORDER — MONTELUKAST SODIUM 10 MG/1
10 TABLET ORAL DAILY
Qty: 90 TABLET | Refills: 3 | Status: SHIPPED | OUTPATIENT
Start: 2025-01-07

## 2025-01-08 ENCOUNTER — APPOINTMENT (OUTPATIENT)
Dept: OBSTETRICS AND GYNECOLOGY | Facility: CLINIC | Age: 50
End: 2025-01-08
Payer: COMMERCIAL

## 2025-01-10 ENCOUNTER — APPOINTMENT (OUTPATIENT)
Age: 50
End: 2025-01-10
Payer: COMMERCIAL

## 2025-01-10 VITALS
HEIGHT: 65 IN | SYSTOLIC BLOOD PRESSURE: 124 MMHG | BODY MASS INDEX: 30.99 KG/M2 | HEART RATE: 84 BPM | WEIGHT: 186 LBS | DIASTOLIC BLOOD PRESSURE: 80 MMHG

## 2025-01-10 DIAGNOSIS — E11.65 TYPE 2 DIABETES MELLITUS WITH HYPERGLYCEMIA, WITHOUT LONG-TERM CURRENT USE OF INSULIN: ICD-10-CM

## 2025-01-10 DIAGNOSIS — K76.0 HEPATIC STEATOSIS: Primary | ICD-10-CM

## 2025-01-10 PROCEDURE — 99213 OFFICE O/P EST LOW 20 MIN: CPT | Performed by: FAMILY MEDICINE

## 2025-01-10 PROCEDURE — 4010F ACE/ARB THERAPY RXD/TAKEN: CPT | Performed by: FAMILY MEDICINE

## 2025-01-10 PROCEDURE — 3074F SYST BP LT 130 MM HG: CPT | Performed by: FAMILY MEDICINE

## 2025-01-10 PROCEDURE — 3008F BODY MASS INDEX DOCD: CPT | Performed by: FAMILY MEDICINE

## 2025-01-10 PROCEDURE — 3079F DIAST BP 80-89 MM HG: CPT | Performed by: FAMILY MEDICINE

## 2025-01-10 ASSESSMENT — PATIENT HEALTH QUESTIONNAIRE - PHQ9
2. FEELING DOWN, DEPRESSED OR HOPELESS: NOT AT ALL
SUM OF ALL RESPONSES TO PHQ9 QUESTIONS 1 AND 2: 0
1. LITTLE INTEREST OR PLEASURE IN DOING THINGS: NOT AT ALL

## 2025-01-10 NOTE — PROGRESS NOTES
Patient presents for periodic surveillance of chronic medical problems.     Subjective  Nubia Martinez is a 49 y.o. female who presents for Follow-up.  HPI  Follow up hepatic ultrasound, shows hepatomegaly with hepatic steatosis.  Discussed this process, dietary modifications that improve, and progression to cirrhosis in some. Discussed whole natural foods, avoiding sugary drinks, avoiding processed foods as much as possible.  Has lab workup pending with her next A1c when due.  Has ortho appt pending for knee.  Anemia as of most recent labs resolved.  States no other problems or concerns.   Review of Systems   All other systems reviewed and are negative.  .    Allergies   Allergen Reactions    Prednisone Other     Elevated blood sugar       Current Outpatient Medications on File Prior to Visit   Medication Sig Dispense Refill    atorvastatin (Lipitor) 40 mg tablet Take 1 tablet (40 mg) by mouth once daily. 90 tablet 1    calcium carbonate (Tums) 200 mg calcium chewable tablet Chew 2 tablets (1,000 mg) once daily.      cetirizine (ZyrTEC) 10 mg tablet Take 1 tablet (10 mg) by mouth once daily as needed for allergies. 30 tablet 11    ertugliflozin (Steglatro) Take 1 tablet (5 mg) by mouth once daily. 30 tablet 3    flash glucose sensor kit (FreeStyle Marciano 14 Day Sensor) kit Inject 1 each under the skin every 14 (fourteen) days. 2 each 5    fluticasone (Flonase) 50 mcg/actuation nasal spray Administer 2 sprays into each nostril once daily. Shake gently. Before first use, prime pump. After use, clean tip and replace cap.      fluticasone (Flovent) 110 mcg/actuation inhaler Inhale 1 puff 2 times a day. Rinse mouth with water after use to reduce aftertaste and incidence of candidiasis. Do not swallow. 12 g 1    glimepiride (Amaryl) 1 mg tablet TAKE ONE TABLET BY MOUTH ONCE DAILY 90 tablet 1    losartan (Cozaar) 25 mg tablet Take 1 tablet (25 mg) by mouth once daily. as directed 90 tablet 1    metFORMIN  mg 24 hr  tablet Take 2 tablets (1,000 mg) by mouth 2 times daily (morning and late afternoon). Do not crush, chew, or split. 360 tablet 1    montelukast (Singulair) 10 mg tablet TAKE ONE TABLET BY MOUTH ONCE DAILY 90 tablet 3    multivitamin-children's (Cerovite, Jr) chewable tablet Chew 1 tablet once daily.      omeprazole (PriLOSEC) 40 mg DR capsule Take 1 capsule (40 mg) by mouth once daily. 90 capsule 1    [DISCONTINUED] montelukast (Singulair) 10 mg tablet TAKE ONE TABLET BY MOUTH ONCE DAILY 90 tablet 1     No current facility-administered medications on file prior to visit.       Patient Active Problem List   Diagnosis    Hiatal hernia with GERD    History of depression    Hyperlipidemia    Seasonal allergies    Type 2 diabetes mellitus with hyperglycemia, without long-term current use of insulin    History of esophageal stricture    Personal history of affective disorder       Objective     Visit Vitals  /80 (BP Location: Left arm, Patient Position: Sitting)   Pulse 84      Physical Exam  Vitals reviewed.   HENT:      Head: Normocephalic.   Eyes:      General: No scleral icterus.  Pulmonary:      Effort: Pulmonary effort is normal.   Skin:     Coloration: Skin is not pale.   Neurological:      General: No focal deficit present.      Mental Status: She is alert.   Psychiatric:         Mood and Affect: Mood normal.         Assessment/Plan   Problem List Items Addressed This Visit       Type 2 diabetes mellitus with hyperglycemia, without long-term current use of insulin     Other Visit Diagnoses       Hepatic steatosis    -  Primary               As per hpi, Follow up as scheduled in March with labs prior, call concerns.     Lisandra Denney MD

## 2025-01-15 DIAGNOSIS — E11.65 TYPE 2 DIABETES MELLITUS WITH HYPERGLYCEMIA, WITHOUT LONG-TERM CURRENT USE OF INSULIN: ICD-10-CM

## 2025-01-15 RX ORDER — FLASH GLUCOSE SENSOR
KIT MISCELLANEOUS
Qty: 1 EACH | Refills: 5 | Status: SHIPPED | OUTPATIENT
Start: 2025-01-15

## 2025-01-17 ENCOUNTER — APPOINTMENT (OUTPATIENT)
Dept: OBSTETRICS AND GYNECOLOGY | Facility: CLINIC | Age: 50
End: 2025-01-17
Payer: COMMERCIAL

## 2025-01-17 VITALS
HEIGHT: 65 IN | DIASTOLIC BLOOD PRESSURE: 82 MMHG | WEIGHT: 185.6 LBS | SYSTOLIC BLOOD PRESSURE: 128 MMHG | BODY MASS INDEX: 30.92 KG/M2

## 2025-01-17 DIAGNOSIS — E11.65 TYPE 2 DIABETES MELLITUS WITH HYPERGLYCEMIA, WITHOUT LONG-TERM CURRENT USE OF INSULIN: ICD-10-CM

## 2025-01-17 DIAGNOSIS — R10.32 INTERMITTENT LEFT LOWER QUADRANT ABDOMINAL PAIN: Primary | ICD-10-CM

## 2025-01-17 DIAGNOSIS — Z12.72 VAGINAL PAP SMEAR: ICD-10-CM

## 2025-01-17 DIAGNOSIS — Z01.419 ENCOUNTER FOR GYNECOLOGICAL EXAMINATION WITHOUT ABNORMAL FINDING: ICD-10-CM

## 2025-01-17 DIAGNOSIS — N90.5 VULVAR ATROPHY: ICD-10-CM

## 2025-01-17 PROCEDURE — 3008F BODY MASS INDEX DOCD: CPT | Performed by: OBSTETRICS & GYNECOLOGY

## 2025-01-17 PROCEDURE — 3074F SYST BP LT 130 MM HG: CPT | Performed by: OBSTETRICS & GYNECOLOGY

## 2025-01-17 PROCEDURE — 1036F TOBACCO NON-USER: CPT | Performed by: OBSTETRICS & GYNECOLOGY

## 2025-01-17 PROCEDURE — 99459 PELVIC EXAMINATION: CPT | Performed by: OBSTETRICS & GYNECOLOGY

## 2025-01-17 PROCEDURE — 3079F DIAST BP 80-89 MM HG: CPT | Performed by: OBSTETRICS & GYNECOLOGY

## 2025-01-17 PROCEDURE — 4010F ACE/ARB THERAPY RXD/TAKEN: CPT | Performed by: OBSTETRICS & GYNECOLOGY

## 2025-01-17 PROCEDURE — 99396 PREV VISIT EST AGE 40-64: CPT | Performed by: OBSTETRICS & GYNECOLOGY

## 2025-01-17 RX ORDER — ESTRADIOL 0.1 MG/G
2 CREAM VAGINAL NIGHTLY
Qty: 34 G | Refills: 3 | Status: SHIPPED | OUTPATIENT
Start: 2025-01-17 | End: 2026-01-17

## 2025-01-17 RX ORDER — ERTUGLIFLOZIN 5 MG/1
TABLET, FILM COATED ORAL
Qty: 30 TABLET | Refills: 3 | Status: SHIPPED | OUTPATIENT
Start: 2025-01-17

## 2025-01-17 SDOH — ECONOMIC STABILITY: FOOD INSECURITY: WITHIN THE PAST 12 MONTHS, THE FOOD YOU BOUGHT JUST DIDN'T LAST AND YOU DIDN'T HAVE MONEY TO GET MORE.: NEVER TRUE

## 2025-01-17 SDOH — ECONOMIC STABILITY: FOOD INSECURITY: WITHIN THE PAST 12 MONTHS, YOU WORRIED THAT YOUR FOOD WOULD RUN OUT BEFORE YOU GOT MONEY TO BUY MORE.: NEVER TRUE

## 2025-01-17 ASSESSMENT — ANXIETY QUESTIONNAIRES
2. NOT BEING ABLE TO STOP OR CONTROL WORRYING: NOT AT ALL
3. WORRYING TOO MUCH ABOUT DIFFERENT THINGS: NOT AT ALL
7. FEELING AFRAID AS IF SOMETHING AWFUL MIGHT HAPPEN: NOT AT ALL
GAD7 TOTAL SCORE: 0
1. FEELING NERVOUS, ANXIOUS, OR ON EDGE: NOT AT ALL
IF YOU CHECKED OFF ANY PROBLEMS ON THIS QUESTIONNAIRE, HOW DIFFICULT HAVE THESE PROBLEMS MADE IT FOR YOU TO DO YOUR WORK, TAKE CARE OF THINGS AT HOME, OR GET ALONG WITH OTHER PEOPLE: NOT DIFFICULT AT ALL
5. BEING SO RESTLESS THAT IT IS HARD TO SIT STILL: NOT AT ALL
4. TROUBLE RELAXING: NOT AT ALL
6. BECOMING EASILY ANNOYED OR IRRITABLE: NOT AT ALL

## 2025-01-17 ASSESSMENT — PAIN SCALES - GENERAL: PAINLEVEL_OUTOF10: 0-NO PAIN

## 2025-01-17 ASSESSMENT — SOCIAL DETERMINANTS OF HEALTH (SDOH)
WITHIN THE LAST YEAR, HAVE YOU BEEN AFRAID OF YOUR PARTNER OR EX-PARTNER?: NO
WITHIN THE LAST YEAR, HAVE TO BEEN RAPED OR FORCED TO HAVE ANY KIND OF SEXUAL ACTIVITY BY YOUR PARTNER OR EX-PARTNER?: NO
WITHIN THE LAST YEAR, HAVE YOU BEEN HUMILIATED OR EMOTIONALLY ABUSED IN OTHER WAYS BY YOUR PARTNER OR EX-PARTNER?: NO
WITHIN THE LAST YEAR, HAVE YOU BEEN KICKED, HIT, SLAPPED, OR OTHERWISE PHYSICALLY HURT BY YOUR PARTNER OR EX-PARTNER?: NO
IN THE PAST 12 MONTHS, HAS THE ELECTRIC, GAS, OIL, OR WATER COMPANY THREATENED TO SHUT OFF SERVICE IN YOUR HOME?: NO
IN THE PAST 12 MONTHS, HAS THE ELECTRIC, GAS, OIL, OR WATER COMPANY THREATENED TO SHUT OFF SERVICE IN YOUR HOME?: YES

## 2025-01-17 NOTE — PROGRESS NOTES
Nubia Martinez is a 50 y.o. female who is here for a routine exam. PCP = Lisandra Denney MD    Chief Complaint   Patient presents with    Gynecologic Exam     Patient is here for yearly exam and pap test. Patient does do regular self breast exams  pt states she is having mild left lower abdominal pain after hyster.  Pt also states she has left side breast pain.  Pt states she is having increased infections and cuts in vaginal area since hyster.at this time. LMP: Hyster          Presents for annual exam. She voices no complaints and is doing well. Denies any bowel or bladder problems.  She has had intermittent left breast pain in the upper inner quadrant.  Denies any breast lumps or nipple discharge.  She had a normal screening mammogram in 2024.  She had a previous hysterectomy.  She has had intermittent left lower quadrant abdominal pain for approximately 6 months.  She has had occasional yeast infections in the last year and she has also been diagnosed with diabetes.    OB History          2    Para   2    Term   2       0    AB   0    Living   2         SAB   0    IAB   0    Ectopic   0    Multiple   0    Live Births   2                  Past Medical History:   Diagnosis Date    Anemia     Anemia     Diabetes (Multi)     Encounter for  delivery without indication ([facility])     Delivery of pregnancy by  section    Encounter for gynecological examination (general) (routine) without abnormal findings 2021    Pap test, as part of routine gynecological examination    Fibroid     GERD (gastroesophageal reflux disease)     Gestational diabetes     Hyperlipidemia, unspecified 10/20/2022    Hyperlipidemia    Hypertension     Other recurrent depressive disorders 01/15/2020    Seasonal affective disorder    Other specified abnormal findings of blood chemistry 2022    Low vitamin D level    Uterine leiomyoma 2024       Past Surgical History:   Procedure Laterality Date     "ANAL SPHINCTEROTOMY      APPENDECTOMY  1986     SECTION, LOW TRANSVERSE  01/27/2000    x2    COLONOSCOPY      DILATION AND CURETTAGE OF UTERUS  10/26/2023    ENDOMETRIAL BIOPSY  2023    ESOPHAGOGASTRODUODENOSCOPY  2010    ESOPHAGUS SURGERY      for GERD    HYSTEROSCOPY  10/26/2023    MYOMECTOMY      TOTAL ABDOMINAL HYSTERECTOMY W/ BILATERAL SALPINGOOPHORECTOMY         Past med hx and past surg hx reviewed and notable for: none    Review of Systems:   Constitutional: No fever or chills  Respiratory: No shortness of breath, or cough  Cardiovascular: No chest pain or syncope  Breasts: No breast pain, no masses, no nipple discharge  Gastrointestinal: No nausea, vomiting, or diarrhea, no abdominal pain  Genitourinary: No dysuria or frequency  Gynecology: Negative except as noted in history of present illness  All other: All other systems reviewed and negative for complaint    Objective   /82   Ht 1.651 m (5' 5\")   Wt 84.2 kg (185 lb 9.6 oz)   LMP 2023 (Exact Date)   BMI 30.89 kg/m²     PHYSICAL EXAMINATION:  Chaperone present for exam: Ro Jiang) Juanita MA  Well-developed, well nourished, in no acute distress, alert and oriented x three, is pleasant and cooperative.   HEENT: Clear. Pupils equal, round and reactive to light and accommodation. Extraocular muscles are intact. Oral mucosa pink without exudate.   NECK: No lymphadenopathy, no thyromegaly.  BREASTS: Symmetric, no palpable masses. No nipple discharge or retraction.  LUNGS: Clear bilaterally.  HEART: Regular rate and rhythm without murmurs.  ABDOMEN: Normoactive bowel sounds, soft and nontender, no guarding or rebound tenderness, no CVA tenderness.  EXTREMITIES: No clubbing, cyanosis or edema.  NEUROLOGIC:  Cranial nerves II-XII grossly intact.  :  Normal external female genitalia.  Normal vulva and vagina.  Noted some vulvar atrophy.  Normal urethral meatus, urethra and bladder. Noted surgical absence of the cervix and " uterus. Well-healed vaginal cuff. Pap smear performed today.      Actions performed during this visit include:  - Clinical breast exam  - Clinical pelvic exam  -   Orders Placed This Encounter   Procedures    CT abdomen pelvis w and wo IV contrast     Standing Status:   Future     Standing Expiration Date:   1/17/2026     Order Specific Question:   Reason for exam:     Answer:   Left lower quadrant abdominal pain     Order Specific Question:   Radiologist to Determine Optimal Study     Answer:   Yes     Order Specific Question:   Release result to [facility]     Answer:   Immediate [1]     Order Specific Question:   Is this exam part of a Research Study? If Yes, link this order to the research study     Answer:   No     Order Specific Question:   May utilize  for port access if port present for this exam.     Answer:   Yes        Problem List Items Addressed This Visit    None  Visit Diagnoses       Intermittent left lower quadrant abdominal pain    -  Primary    Relevant Orders    CT abdomen pelvis w and wo IV contrast    Encounter for gynecological examination without abnormal finding        Relevant Orders    THINPREP PAP    Vaginal Pap smear        Relevant Orders    THINPREP PAP    Vulvar atrophy        Relevant Medications    estradiol (Estrace) 0.01 % (0.1 mg/gram) vaginal cream             Provider Impression:  1.  Annual  2.  Left lower quadrant intermittent abdominal pain  3.  Vulvar atrophy  Recommend obtaining CT scan of the abdomen pelvis.  Prescription for Estrace vaginal cream for the vulvar atrophy.  Today's breast exam is normal.  She had a normal screening mammogram in August.  Her intermittent left breast pain could be musculoskeletal in nature.    Thank you for coming to your annual exam. Your findings during the exam were normal.  Please return for your next visit in 2 weeks to review CT scan.

## 2025-01-19 DIAGNOSIS — Z00.00 HEALTHCARE MAINTENANCE: ICD-10-CM

## 2025-01-20 RX ORDER — GLIMEPIRIDE 1 MG/1
1 TABLET ORAL DAILY
Qty: 90 TABLET | Refills: 1 | Status: SHIPPED | OUTPATIENT
Start: 2025-01-20

## 2025-01-24 ENCOUNTER — HOSPITAL ENCOUNTER (OUTPATIENT)
Dept: RADIOLOGY | Facility: HOSPITAL | Age: 50
Discharge: HOME | End: 2025-01-24
Payer: COMMERCIAL

## 2025-01-24 DIAGNOSIS — R10.32 INTERMITTENT LEFT LOWER QUADRANT ABDOMINAL PAIN: ICD-10-CM

## 2025-01-24 PROCEDURE — 2550000001 HC RX 255 CONTRASTS: Performed by: OBSTETRICS & GYNECOLOGY

## 2025-01-24 PROCEDURE — 74177 CT ABD & PELVIS W/CONTRAST: CPT

## 2025-01-24 RX ADMIN — IOHEXOL 68 ML: 350 INJECTION, SOLUTION INTRAVENOUS at 11:00

## 2025-02-05 LAB
CYTOLOGY CMNT CVX/VAG CYTO-IMP: NORMAL
LAB AP HPV GENOTYPE QUESTION: YES
LAB AP HPV HR: NORMAL
LABORATORY COMMENT REPORT: NORMAL
MENSTRUAL HX REPORTED: NORMAL
PATH REPORT.TOTAL CANCER: NORMAL

## 2025-02-06 DIAGNOSIS — M25.572 LEFT ANKLE PAIN, UNSPECIFIED CHRONICITY: ICD-10-CM

## 2025-02-06 ASSESSMENT — ENCOUNTER SYMPTOMS
FLATUS: 1
ABDOMINAL PAIN: 1

## 2025-02-07 ENCOUNTER — TELEPHONE (OUTPATIENT)
Age: 50
End: 2025-02-07

## 2025-02-07 ENCOUNTER — APPOINTMENT (OUTPATIENT)
Dept: OBSTETRICS AND GYNECOLOGY | Facility: CLINIC | Age: 50
End: 2025-02-07
Payer: COMMERCIAL

## 2025-02-07 VITALS
WEIGHT: 185.2 LBS | HEIGHT: 65 IN | BODY MASS INDEX: 30.86 KG/M2 | DIASTOLIC BLOOD PRESSURE: 64 MMHG | SYSTOLIC BLOOD PRESSURE: 122 MMHG

## 2025-02-07 DIAGNOSIS — R10.32 INTERMITTENT LEFT LOWER QUADRANT ABDOMINAL PAIN: Primary | ICD-10-CM

## 2025-02-07 PROCEDURE — 99213 OFFICE O/P EST LOW 20 MIN: CPT | Performed by: OBSTETRICS & GYNECOLOGY

## 2025-02-07 PROCEDURE — 4010F ACE/ARB THERAPY RXD/TAKEN: CPT | Performed by: OBSTETRICS & GYNECOLOGY

## 2025-02-07 PROCEDURE — 3078F DIAST BP <80 MM HG: CPT | Performed by: OBSTETRICS & GYNECOLOGY

## 2025-02-07 PROCEDURE — 3074F SYST BP LT 130 MM HG: CPT | Performed by: OBSTETRICS & GYNECOLOGY

## 2025-02-07 PROCEDURE — 1036F TOBACCO NON-USER: CPT | Performed by: OBSTETRICS & GYNECOLOGY

## 2025-02-07 PROCEDURE — 3008F BODY MASS INDEX DOCD: CPT | Performed by: OBSTETRICS & GYNECOLOGY

## 2025-02-07 ASSESSMENT — PAIN SCALES - GENERAL: PAINLEVEL_OUTOF10: 0-NO PAIN

## 2025-02-07 ASSESSMENT — PATIENT HEALTH QUESTIONNAIRE - PHQ9
SUM OF ALL RESPONSES TO PHQ9 QUESTIONS 1 AND 2: 0
2. FEELING DOWN, DEPRESSED OR HOPELESS: NOT AT ALL
1. LITTLE INTEREST OR PLEASURE IN DOING THINGS: NOT AT ALL

## 2025-02-07 ASSESSMENT — ENCOUNTER SYMPTOMS
FLATUS: 1
ABDOMINAL PAIN: 1

## 2025-02-07 NOTE — TELEPHONE ENCOUNTER
GRECIA stating she has a CT done by her GYN and saw them today to go over results; wonders if JOJ could look at it and let her know if JOJ would want to see her about the results too. Please advise.

## 2025-02-07 NOTE — PROGRESS NOTES
Nubia Martinez is a 50 y.o. year old female patient.  PCP = Lisandra Denney MD    Chief Complaint   Patient presents with    Results     Patient is here to review CT results.        Abdominal Pain  This is a chronic problem. The current episode started more than 1 month ago. The onset quality is sudden. The problem occurs intermittently. The problem has been unchanged. The pain is located in the LLQ and suprapubic region. The pain is at a severity of 4/10. The quality of the pain is sharp. The abdominal pain does not radiate. Associated symptoms include flatus. Prior diagnostic workup includes CT scan, lower endoscopy, ultrasound and upper endoscopy.      Presents to review CT scan results due to intermittent low abdominal discomfort.  Patient states that discomfort is only present when she pushes on her abdomen or her pet lies on her stomach.  She had previous hysterectomy.    OB History          2    Para   2    Term   2       0    AB   0    Living   2         SAB   0    IAB   0    Ectopic   0    Multiple   0    Live Births   2                 Past Medical History:   Diagnosis Date    Anemia     Anemia     Ankle dislocation     Ankle sprain     Arthritis     Bursitis of shoulder     Diabetes (Multi)     Dislocation of finger     Encounter for  delivery without indication ([facility])     Delivery of pregnancy by  section    Encounter for gynecological examination (general) (routine) without abnormal findings 2021    Pap test, as part of routine gynecological examination    Fibroid     Fracture of hand     GERD (gastroesophageal reflux disease)     Gestational diabetes     Hyperlipidemia, unspecified 10/20/2022    Hyperlipidemia    Hypertension     Knee sprain     Low back sprain     Lumbosacral disc disease     Other recurrent depressive disorders 01/15/2020    Seasonal affective disorder    Other specified abnormal findings of blood chemistry 2022    Low vitamin D level     Tear of meniscus of knee     Tendinitis of knee     Urinary tract infection     Uterine leiomyoma 2024    Wrist sprain        Past Surgical History:   Procedure Laterality Date    ANAL SPHINCTEROTOMY      APPENDECTOMY  1986     SECTION, LOW TRANSVERSE  01/27/2000    x2    COLONOSCOPY      DILATION AND CURETTAGE OF UTERUS  10/26/2023    ENDOMETRIAL BIOPSY  2023    ESOPHAGOGASTRODUODENOSCOPY      ESOPHAGUS SURGERY      for GERD    HYSTEROSCOPY  10/26/2023    MYOMECTOMY      TOTAL ABDOMINAL HYSTERECTOMY W/ BILATERAL SALPINGOOPHORECTOMY         Review of Systems:   Constitutional: No fever or chills  Respiratory: No shortness of breath, or cough  Cardiovascular: No chest pain or syncope  Breasts: No breast pain, no masses, no nipple discharge  Gastrointestinal: No nausea, vomiting, or diarrhea, no abdominal pain  Genitourinary: No dysuria or frequency  Gynecology: Negative except as noted in history of present illness  All other: All other systems reviewed and negative for complaint    Medication Documentation Review Audit       Reviewed by Alf Guillen MD (Physician) on 25 at 0918      Medication Order Taking? Sig Documenting Provider Last Dose Status   atorvastatin (Lipitor) 40 mg tablet [id number]  Take 1 tablet (40 mg) by mouth once daily. Lisandra Denney MD  Active   calcium carbonate (Tums) 200 mg calcium chewable tablet [id number] No Chew 2 tablets (1,000 mg) once daily. Historical Provider, MD Taking Active   cetirizine (ZyrTEC) 10 mg tablet [id number] No Take 1 tablet (10 mg) by mouth once daily as needed for allergies. Lisandra Denney MD Taking Active   estradiol (Estrace) 0.01 % (0.1 mg/gram) vaginal cream [id number]  Insert 0.5 Applicatorfuls (2 g) into the vagina once daily at bedtime. At bedtime for 2 weeks, then at bedtime twice a week. Alf Guillen MD  Active   fluticasone (Flonase) 50 mcg/actuation nasal spray [id number] No Administer 2 sprays into each nostril once daily.  "Shake gently. Before first use, prime pump. After use, clean tip and replace cap. Historical Provider, MD Taking Active   fluticasone (Flovent) 110 mcg/actuation inhaler [id number] No Inhale 1 puff 2 times a day. Rinse mouth with water after use to reduce aftertaste and incidence of candidiasis. Do not swallow. Lisandra Denney MD Taking Active   FreeStyle Marciano 14 Day Sensor kit [id number]  USE AS DIRECTED EVERY 14 days Lisandra Denney MD  Active   glimepiride (Amaryl) 1 mg tablet [id number]  TAKE ONE TABLET BY MOUTH ONCE DAILY Lisandra Denney MD  Active   losartan (Cozaar) 25 mg tablet [id number]  Take 1 tablet (25 mg) by mouth once daily. as directed Lisandra Denney MD  Active   metFORMIN  mg 24 hr tablet [id number]  Take 2 tablets (1,000 mg) by mouth 2 times daily (morning and late afternoon). Do not crush, chew, or split. Lisandra Denney MD  Active   montelukast (Singulair) 10 mg tablet [id number]  TAKE ONE TABLET BY MOUTH ONCE DAILY Lisandra Denney MD  Active   multivitamin-children's (Cerovite, Jr) chewable tablet [id number] No Chew 1 tablet once daily. Historical Provider, MD Taking Active   omeprazole (PriLOSEC) 40 mg DR capsule [id number]  Take 1 capsule (40 mg) by mouth once daily. Lisandra Denney MD  Active   Steglatro [id number]  TAKE 1 TABLET (5mg) BY MOUTH ONCE DAILY Lisandra Denney MD  Active                     /64   Ht 1.651 m (5' 5\")   Wt 84 kg (185 lb 3.2 oz)   LMP 12/01/2023 (Exact Date)   BMI 30.82 kg/m²     PHYSICAL EXAMINATION:  General: No acute distress  Eye: Intraocular movements are intact  HEENT: Normocephalic  Respiratory: Respirations are nonlabored  Gastrointestinal: Nondistended   Musculoskeletal: Normal range of motion  Neurologic: Alert and oriented x3  Psychiatric: Cooperative, appropriate mood and affect.    Interpreted By:  Delfino Sarkar,   STUDY:  CT ABDOMEN PELVIS W IV CONTRAST;  1/24/2025 10:57 am      INDICATION:  Signs/Symptoms:Left lower quadrant abdominal pain.    "   ,R10.32 Left lower quadrant pain      COMPARISON:  None.      ACCESSION NUMBER(S):  [id number]      ORDERING CLINICIAN:  ISIS NOEL      TECHNIQUE:  CT of the abdomen and pelvis was performed.  Standard contiguous  axial images were obtained at 3 mm slice thickness through the  abdomen and pelvis. Coronal and sagittal reconstructions at 3 mm  slice thickness were performed.  68 ML of Omnipaque 350 was  administered intravenously without immediate complication.      FINDINGS:  LOWER CHEST:  The partially visualized lower lungs are unremarkable. The heart is  normal in size without significant pericardial effusion. The distal  esophagus is unremarkable.      ABDOMEN:      LIVER:  The liver is enlarged measuring approximately 20 cm in craniocaudal  extent. There is diffuse hypoattenuation of the hepatic parenchyma  consistent with hepatic steatosis. No suspicious hepatic lesions.      BILE DUCTS:  The intrahepatic and extrahepatic ducts are not dilated.      GALLBLADDER:  The gallbladder is nondistended and without evidence of radiopaque  stones.      PANCREAS:  The pancreas appears unremarkable without evidence of ductal  dilatation or masses.      SPLEEN:  The spleen is normal in size without focal lesions.      ADRENAL GLANDS:  Bilateral adrenal glands appear normal.      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 There is a  nonobstructing renal calculus within the left interpolar kidney  measuring up to 1.4 cm (series 6, image 51). No right-sided  nephrolithiasis. No hydroureteronephrosis bilaterally.      PELVIS:      BLADDER:  The urinary bladder appears normal without abnormal wall thickening.      REPRODUCTIVE ORGANS:  The uterus is surgically absent. No pelvic mass.      BOWEL:  Of the stomach is unremarkable. No inflammatory bowel wall thickening  or dilatation. The appendix is not definitely visualized. There is  however no pericecal stranding or fluid.       VESSELS:  There is no aneurysmal dilatation of the abdominal aorta. The IVC  appears normal. Mild atherosclerotic calcifications of the abdominal  aorta and its branching vessels are noted.      PERITONEUM/RETROPERITONEUM/LYMPH NODES:  There is no free or loculated fluid collection, no free  intraperitoneal air. The retroperitoneum appears normal.  No  abdominopelvic lymphadenopathy by CT size criteria.      BONES AND ABDOMINAL WALL:  No acute osseous abnormalities or suspicious osseous lesions. Mild  multilevel discogenic degenerative changes are noted throughout the  lower thoracic and lumbar spine with scattered intervertebral disc  space narrowing and vertebral body osteophytosis.   The abdominal  wall soft tissues appear normal.      IMPRESSION:  1.  No acute process within the abdomen or pelvis.  2. Incidental note is made of a horseshoe kidney with a  nonobstructing renal calculus in the left interpolar kidney measuring  up to 1.4 cm in diameter. No right-sided nephrolithiasis. No  hydronephrosis bilaterally.  3. Hepatomegaly and hepatic steatosis. Correlate with serum LFTs.      MACRO:  None      Signed by: Delfino Sarkar 1/25/2025 8:19 AM  Dictation workstation:   [id number]    Problem List Items Addressed This Visit    None  Visit Diagnoses       Intermittent left lower quadrant abdominal pain    -  Primary             Provider Impression:  1.  Intermittent left lower quadrant abdominal pain personally reviewed the CT scan  Which shows horseshoe kidney with a 1.4 cm nonobstructing renal calculus.  Her liver is also noted to be enlarged.  Patient is already aware of the enlarged liver which is being evaluated by her primary care physician.  Patient informed there is no evidence of any pelvic masses.  It is uncertain as to the etiology for her intermittent low abdominal discomfort.  She is encouraged to follow-up with her primary care provider regarding the CT scan results.  One possibility of her  discomfort could be musculoskeletal in nature.  Patient to return in 1 year for annual exam.

## 2025-02-12 ENCOUNTER — HOSPITAL ENCOUNTER (OUTPATIENT)
Dept: RADIOLOGY | Facility: EXTERNAL LOCATION | Age: 50
Discharge: HOME | End: 2025-02-12

## 2025-02-12 ENCOUNTER — HOSPITAL ENCOUNTER (OUTPATIENT)
Dept: RADIOLOGY | Facility: CLINIC | Age: 50
Discharge: HOME | End: 2025-02-12
Payer: COMMERCIAL

## 2025-02-12 ENCOUNTER — APPOINTMENT (OUTPATIENT)
Dept: ORTHOPEDIC SURGERY | Facility: CLINIC | Age: 50
End: 2025-02-12
Payer: COMMERCIAL

## 2025-02-12 VITALS — WEIGHT: 185 LBS | BODY MASS INDEX: 30.79 KG/M2

## 2025-02-12 DIAGNOSIS — M25.572 CHRONIC PAIN OF LEFT ANKLE: ICD-10-CM

## 2025-02-12 DIAGNOSIS — G89.29 CHRONIC LEFT SHOULDER PAIN: ICD-10-CM

## 2025-02-12 DIAGNOSIS — M25.562 BILATERAL CHRONIC KNEE PAIN: ICD-10-CM

## 2025-02-12 DIAGNOSIS — G89.29 CHRONIC PAIN OF LEFT ANKLE: ICD-10-CM

## 2025-02-12 DIAGNOSIS — G89.29 CHRONIC PAIN OF BOTH KNEES: ICD-10-CM

## 2025-02-12 DIAGNOSIS — G89.29 BILATERAL CHRONIC KNEE PAIN: ICD-10-CM

## 2025-02-12 DIAGNOSIS — M25.562 CHRONIC PAIN OF BOTH KNEES: ICD-10-CM

## 2025-02-12 DIAGNOSIS — M25.561 CHRONIC PAIN OF BOTH KNEES: ICD-10-CM

## 2025-02-12 DIAGNOSIS — M25.572 LEFT ANKLE PAIN, UNSPECIFIED CHRONICITY: ICD-10-CM

## 2025-02-12 DIAGNOSIS — M25.561 BILATERAL CHRONIC KNEE PAIN: ICD-10-CM

## 2025-02-12 DIAGNOSIS — M25.512 CHRONIC LEFT SHOULDER PAIN: ICD-10-CM

## 2025-02-12 PROCEDURE — 99204 OFFICE O/P NEW MOD 45 MIN: CPT | Performed by: SPECIALIST

## 2025-02-12 PROCEDURE — 4010F ACE/ARB THERAPY RXD/TAKEN: CPT | Performed by: SPECIALIST

## 2025-02-12 PROCEDURE — 1036F TOBACCO NON-USER: CPT | Performed by: SPECIALIST

## 2025-02-12 PROCEDURE — 73610 X-RAY EXAM OF ANKLE: CPT | Mod: LT

## 2025-02-12 RX ORDER — DICLOFENAC SODIUM 10 MG/G
4 GEL TOPICAL 4 TIMES DAILY PRN
Qty: 100 G | Refills: 1 | Status: SHIPPED | OUTPATIENT
Start: 2025-02-12

## 2025-02-12 NOTE — ASSESSMENT & PLAN NOTE
Assessment: Left ankle contusion to the calcaneus at the Achilles insertion  Bilateral patellofemoral instability with a history of patellar dislocation when she was adolescent.  She does not recall which knee.    Plan:  Voltaren gel use as directed  Stretching and strengthening exercises for the Achilles.  Physical therapy for patellofemoral instability emphasizing quadriceps flexibility and vastus medialis oblique is strengthening.  Consider MRI scanning if she does not make appropriate progress.  Follow-up in 6 to 8 weeks for reevaluation.  I discussed weight loss with her as this would benefit all of her problems and her diabetes.  Her concern over the prednisone was she took it orally and it increased her sugars.  Intra-articular injections are probably safe with appropriate monitoring.

## 2025-02-12 NOTE — PROGRESS NOTES
Assessment/Plan   Encounter Diagnoses:  Bilateral chronic knee pain    Chronic pain of both knees    Chronic pain of left ankle    Chronic left shoulder pain  Chronic pain of left ankle  Assessment: Left ankle contusion to the calcaneus at the Achilles insertion  Bilateral patellofemoral instability with a history of patellar dislocation when she was adolescent.  She does not recall which knee.    Plan:  Voltaren gel use as directed  Stretching and strengthening exercises for the Achilles.  Physical therapy for patellofemoral instability emphasizing quadriceps flexibility and vastus medialis oblique is strengthening.  Consider MRI scanning if she does not make appropriate progress.  Follow-up in 6 to 8 weeks for reevaluation.  I discussed weight loss with her as this would benefit all of her problems and her diabetes.  Her concern over the prednisone was she took it orally and it increased her sugars.  Intra-articular injections are probably safe with appropriate monitoring.       Subjective    Patient ID: Nubia Martinez is a 50 y.o. female.    Chief Complaint: New Patient Visit of the Right Knee (12-19-24/Arthritis been diaginosed/No recent cortisone injections/Pain Rate 5/Uses tylenol and motrin/Has a patellar knee stablizer), New Patient Visit of the Left Knee (Torn ligaments for the left /X-rays 12-19-24/Uses tylenol and motrin for pain/Has a knee brace/No recent injections/Pain Rate 7/No recent surgeries), and New Patient Visit of the Left Ankle (Hit on the hard part of a recliner /X-rays 2-12-25/Doi 12-01-24/Pain rate 7)     Last Surgery: No surgery found  Last Surgery Date: No surgery found    HPI  50-year-old who about 2-1/2 months ago hit the back of her heel against a recliner.  She has had pain since.  She points to the Achilles insertion into the calcaneus there were as to where she gets most of her pain.  With respect to her knees.  She has had a long history of patellar instability.  She was told by  another orthopedic surgeon that she had shallow trochlear notches.  She comes in to see me for a evaluation of the knees.    OBJECTIVE: ORTHO EXAM  LEFT ANKLE AND FOOT  Gait:   WNL No Antalgia    No Ambulatory Aid    No Bracing  Inspection:    Medial no swelling    Lateral no swelling    Dorsum no swelling  Achilles insertion no swelling was noted although small bursa is seen on ultrasound and is palpable.  Palpation:    CFL: No tenderness       ATFL: No tenderness     Medial Malleolus: No tenderness to deep palp over the bone and deltoid ligament.  Lateral Malleolus: No tenderness to deep palp of bone.  Calves: No tenderness bilat. she does have a tight gastroc/soleus i.e. Achilles tendon.  She gets 0 degrees of dorsiflexion with her knee extended.  This improves slightly with the knee flexed.  Her calf is supple and nontender to palpation otherwise.  The Achilles tendon and the Kaid triangle was nontender to palpation.  The calcaneus at the insertion was tender to palpation.    Emily's Sign: Negative  Vascular:  Excellent capillary Refill    Dorsalis Pedis: palpable  Range of Motion:    Dorsiflexion to 5 degrees    Plantar flexion 15 degrees,   Motor Exam:    Dorsiflexion: 5 out of 5    Plantar Flexion: 5 out of 5    Sensory: Intact to Light Touch  Reflex: No pathologic reflexes noted.  Anterior Drawer Test: Negative for instability    Right knee exam  0 to 120 degrees range of motion.  Minimal effusion.  Some apprehension with lateral subluxation.  She got to the second quadrant but started to resist.  No crepitation in the patellofemoral joint with gentle range of motion.  The retinaculum was minimally tender medially and nontender laterally.  There was no significant patellar tilt.  Her thigh is supple and nontender.  Her calves are supple and nontender.  The iliotibial band was nontender.    Left knee exam  0 to 120 degrees range of motion.  Minimal effusion.  Some apprehension with lateral subluxation.  She  got to the second quadrant but started to resist.  Minimal crepitation in the patellofemoral joint with gentle range of motion.  The retinaculum was minimally tender medially and nontender laterally.  There was no significant patellar tilt.  Her thigh is supple and nontender.  Her calves are supple and nontender.  The iliotibial band was nontender except minimally over the epicondyle.    IMAGE RESULTS:  Point of Care Ultrasound  These images are not reportable by radiology and will not be interpreted   by  Radiologists.      ULTRASOUND  DIAGNOSTIC ULTRASOUND REPORT FINAL: [LEFT] ANKLE  Sonographer: Evelio Neri MD  Indication: [LEFT] Ankle Pain  Procedure: Ultrasound, extremity, nonvascular, real-time, COMPLETE, anatomic specific  Technique: B-Mode Ultrasound Examination performed using 8-13 MHz linear transducer with [employer] Software  STUDY TYPE:   1. ULTRASOUND EXTREMITY  2. REAL TIME WITH IMAGE DOCUMENTATION  3. NON-VASCULAR  4. COMPLETE STUDY, INCLUDING BUT NOT LIMITED TO MUSCLE, TENDONS, LIGAMENTS, SOFT TISSUES, ADIPOSE TISSUE AND SUBCUTANEOUS TISSUE.  Site: KNEE   Ultrasound gel was applied to provide for a conductive media for the ultrasound waves to penetrate the tissues and for reflexion of these ultrasonic waves back to the probe for image capture. Live ultrasound was performed with of patient's  KNEE and PERMANENTLY documented. This is a COMPLETE and FINAL ULTRASOUND REPORT of the patient's ANKLE.  Gauze pads were used to clean the area after completion of the ultrasound evaluation. Nonsterile gloves were used for this procedure. I personally performed the ultrasound and reviewed the findings. These show:  Darline-articular evaluation: Live ultrasound was performed of the patient's  ANKLE that shows no significant soft tissue swelling.  The anterior talofibular ligament appears to be intact and normal sonographically.  The posterior calcaneofibular ligament appears to be intact and normal sonographically.   The fibular bone appears normal sonographically with no evidence of fracture.  No significant soft tissue fluid collection/abscess appreciated.  Medially, the deltoid ligament appears to be within normal limits and intact sonographically.  The medial malleolus appears normal sonographically with no evidence of fracture.  No significant soft tissue swelling collection/abscess is appreciated.  Posteriorly, the Achilles tendon appears normal sonographically without evidence of rupture.  Slight abnormal bursa is seen at the insertion of the Achilles into the calcaneus.  The posterior tibial tendon is visualized from the posterior malleolus to the navicular and appears normal sonographically.  The peroneal tendons posterior-laterally are not visualized posterior to the fibula and down to the lateral border of the forefoot.  They appear sonographically within normal limits.  No tibiotalar joint space effusion is seen.  The anterior tendons and the retinacula appear sonographically normal.  The patient tolerated the sonographic examination of their ankle well.    DIAGNOSTIC ULTRASOUND REPORT FINAL: Left KNEE  Sonographer: Evelio Neri MD  Indication: Knee Pain  Procedure: Ultrasound, extremity, nonvascular, real-time, COMPLETE, anatomic specific  Technique: B-Mode Ultrasound Examination performed using 6- 9 MHz linear transducer with [employer] Software  STUDY TYPE:   1. ULTRASOUND EXTREMITY  2. REAL TIME WITH IMAGE DOCUMENTATION  3. NON-VASCULAR  4. COMPLETE STUDY, INCLUDING BUT NOT LIMITED TO MUSCLE, TENDONS, LIGAMENTS, SOFT TISSUES, ADIPOSE TISSUE AND SUBCUTANEOUS TISSUE.  Site: KNEE   Live ultrasound was performed with of patient's  KNEE and PERMANENTLY documented. I personally performed the ultrasound and reviewed the findings. These show:    Darline-articular evaluation:   An intact Quadriceps Tendon with the Quadriceps Muscle fibers showing normal striations Quadriceps Tendon demonstrating normal fibrillar pattern. . The  Patellar Tendon demonstrates normal fibrillar pattern and is intact.   No significant soft tissue fluid collection/abscess appreciated.     Joint Evaluation:  The lateral joint line shows an intact LCL.   Medial joint line exam shows an intact MCL.   The patellar tendon was within normal limits.  No joint effusion noted.     The patient tolerated the procedure well.      DIAGNOSTIC ULTRASOUND REPORT FINAL: Right KNEE  Sonographer: Evelio Neri MD  Indication: Knee Pain  Procedure: Ultrasound, extremity, nonvascular, real-time, COMPLETE, anatomic specific  Technique: B-Mode Ultrasound Examination performed using 6- 9 MHz linear transducer with [employer] Software  STUDY TYPE:   1. ULTRASOUND EXTREMITY  2. REAL TIME WITH IMAGE DOCUMENTATION  3. NON-VASCULAR  4. COMPLETE STUDY, INCLUDING BUT NOT LIMITED TO MUSCLE, TENDONS, LIGAMENTS, SOFT TISSUES, ADIPOSE TISSUE AND SUBCUTANEOUS TISSUE.  Site: KNEE   Live ultrasound was performed with of patient's  KNEE and PERMANENTLY documented. I personally performed the ultrasound and reviewed the findings. These show:    Darline-articular evaluation:   An intact Quadriceps Tendon with the Quadriceps Muscle fibers showing normal striations Quadriceps Tendon demonstrating normal fibrillar pattern. . The Patellar Tendon demonstrates normal fibrillar pattern and is intact.   No significant soft tissue fluid collection/abscess appreciated.     Joint Evaluation:  The lateral joint line shows an intact LCL.   Medial joint line exam shows an intact MCL.   The patellar tendon was within normal limits.  Minimal joint effusion noted.     The patient tolerated the procedure well.        Procedures     Orders Placed This Encounter    Point of Care Ultrasound    XR shoulder left 2+ views    Referral to Physical Therapy

## 2025-02-13 ENCOUNTER — APPOINTMENT (OUTPATIENT)
Dept: ORTHOPEDIC SURGERY | Facility: CLINIC | Age: 50
End: 2025-02-13
Payer: COMMERCIAL

## 2025-02-18 ENCOUNTER — EVALUATION (OUTPATIENT)
Dept: PHYSICAL THERAPY | Facility: CLINIC | Age: 50
End: 2025-02-18
Payer: COMMERCIAL

## 2025-02-18 DIAGNOSIS — M25.561 BILATERAL CHRONIC KNEE PAIN: ICD-10-CM

## 2025-02-18 DIAGNOSIS — M25.572 CHRONIC PAIN OF LEFT ANKLE: ICD-10-CM

## 2025-02-18 DIAGNOSIS — M25.562 BILATERAL CHRONIC KNEE PAIN: ICD-10-CM

## 2025-02-18 DIAGNOSIS — G89.29 CHRONIC PAIN OF LEFT ANKLE: ICD-10-CM

## 2025-02-18 DIAGNOSIS — G89.29 BILATERAL CHRONIC KNEE PAIN: ICD-10-CM

## 2025-02-18 PROCEDURE — 97110 THERAPEUTIC EXERCISES: CPT | Mod: GP | Performed by: PHYSICAL THERAPIST

## 2025-02-18 PROCEDURE — 97161 PT EVAL LOW COMPLEX 20 MIN: CPT | Mod: GP | Performed by: PHYSICAL THERAPIST

## 2025-02-18 ASSESSMENT — ENCOUNTER SYMPTOMS
DEPRESSION: 0
OCCASIONAL FEELINGS OF UNSTEADINESS: 0
LOSS OF SENSATION IN FEET: 0

## 2025-02-18 ASSESSMENT — PAIN SCALES - GENERAL: PAINLEVEL_OUTOF10: 2

## 2025-02-18 ASSESSMENT — PAIN - FUNCTIONAL ASSESSMENT: PAIN_FUNCTIONAL_ASSESSMENT: 0-10

## 2025-02-18 NOTE — PROGRESS NOTES
Physical Therapy Evaluation and Treatment      Patient Name: Nubia Martinez  MRN: [id number]  Today's Date: 2/18/2025  Time Calculation  Start Time: 0905  Stop Time: 0945  Time Calculation (min): 40 min    PT Evaluation Time Entry  PT Evaluation (Low) Time Entry: 29   PT Therapeutic Procedures Time Entry  Therapeutic Exercise Time Entry: 10                         Assessment:  PT Assessment Results: Decreased strength, Decreased range of motion, Impaired balance, Decreased mobility, Pain  Rehab Prognosis: Good  Barriers to Participation:  (None)    The pt presents with Medical Dx of B/L Chronic Knee Pain, Chronic Pain of L Ankle.   Pt presents with the following deficits: increased pain, decreased strength, ROM, flexibility, balance, gait and functional mobility.  Pt would benefit from PT services in order to improve on these deficits and maximize strength and ability for functional activity/mobility.        Plan:  Treatment/Interventions: Cryotherapy, Education/ Instruction, Hot pack, Gait training, Manual therapy, Neuromuscular re-education, Therapeutic exercises  PT Plan: Skilled PT  PT Frequency:  (2x/wk for 5 weeks or 11 sessions)  Rehab Potential: Good  Plan of Care Agreement: Patient (Patient Goal:  Improve pain and get around better with less pain.)    Current Problem:   Problem List Items Addressed This Visit             ICD-10-CM    Chronic pain of left ankle M25.572, G89.29    Bilateral chronic knee pain M25.561, M25.562, G89.29       Subjective   Pt has a long chronic hx of B/L knee pain since childhood but started to have L ankle pain in 12/2024.  Pt reports that she banged her ankle on a recliner and had pain since.  Pt reports that MD feels it may be a bone bruise.  Pt reports that she has been walking more for exercise lately and that the b/l knee pain got worse over the last couple months.  Pt reports that walking longer distances and stair negotiation descending stairs make the L ankle and b/l knee  pain worse.  Pt reports that rest, tylenol and sitting helps with the pain.      General  Reason for Referral: B/L knee and L ankle issue  Referred By: Dr. Neri  General Comment: Visit # 1/10  Visit #1    Precautions:  Precautions  Precautions Comment: Low Fall Risk.   PMH:  DM, increased cholesterol, hx of basal cell carcinoma (NO active CA), fatty liver    Pain:  Pain Assessment  Pain Assessment: 0-10  0-10 (Numeric) Pain Score: 2 (B/L knee and L ankle pain range 2-6/10)  Pain Type: Chronic pain  Pain Location: Knee (L ankle)  Pain Orientation: Right, Left    Home Living:   Pt lives with her family in a 1 story home.  1 flt of stairs to basement with rail.  No concerns on living set up.      Prior Level of Function:   Pt was I with all ADL's and IADL's prior.  Pt works Full time for [employer].       Objective   Observation:  Mild edema B/L knee and L ankle.    Gait:  Mildly antalgic gait with mildly slower rachelle.     Palpation:  Tender over L calcaneus and achilles area.  Tender B/L lateral knee and joint line.    Strength:                         R-   Knee flex   4/5   Knee ext   4/5   hip flex/abd   /5   DF   5/5   PF   5/5    EV   5/5       INV  5/5       L-   Knee flex   4/5   Knee ext   4/5   hip flex/abd   /5   DF   4/5   PF   4/5     EV    4/5     INV    5/5    Knee PROM:                     R-   Knee flex  135 deg    Knee ext  0 deg        L-   Knee flex  135  deg    Knee ext  0 deg     L-   Ankle   DF  6 deg    INV  35  deg    EV   35  deg      Special Tests:          L  ATFL Stress Test       (+)                     Outcome Measures:  Other Measures  Lower Extremity Funtional Score (LEFS): 47     Treatments:  Focus on strength, ROM/flexibility and proprioception of B/L Knees and L ankle.  Ther Ex: 10 min   1 unit  Resistive Ankle DF/Inv/Ev   Green Band   x 15 reps ea  DF Strap Stretch  30 sec hold x 3  Ankle PROM all planes    HEP instruction with handout given        OP EDUCATION:   PT edu pt  regarding PT POC/course of treatment and HEP.  Pt was given exercise handout as a reference.  Pt verbalized good understanding.  Access Code: [email or web address]       Goals:  Active       PT Problem       PT Goal 1       Start:  25    Expected End:  25       LTG's:    1) Improve  B/L knee and L ankle strength from 4/5  to >= 5-/5 throughout in order to facilitate safe gait and mobility.      4-6 wks      2) Improve  L ankle DF PROM from  6 deg DF to >= 12 deg DF deg in order to facilitate safe gait and mobility.        4-6 wks      3) Improve L ankle and B/L knee pain from   6/10 to <= 0-3/10 with activity in order to improve QOL.         4-6 wks      4) Improve LFES score by >= 5 points in order to improve QOL.      4-6 wks      5) Pt will be able to walk longer distances, negotiate stairs and return to exercise and work around the home or on the job without significant limitation.      4-6 wks        ST) Pt/caregiver will be I and consistent with HEP with use of handout as needed in order to maximize strength and flexibility.      2-3 wks

## 2025-02-19 ENCOUNTER — APPOINTMENT (OUTPATIENT)
Dept: ORTHOPEDIC SURGERY | Facility: CLINIC | Age: 50
End: 2025-02-19
Payer: COMMERCIAL

## 2025-02-21 ENCOUNTER — APPOINTMENT (OUTPATIENT)
Dept: PHYSICAL THERAPY | Facility: CLINIC | Age: 50
End: 2025-02-21
Payer: COMMERCIAL

## 2025-02-24 ENCOUNTER — TREATMENT (OUTPATIENT)
Dept: PHYSICAL THERAPY | Facility: CLINIC | Age: 50
End: 2025-02-24
Payer: COMMERCIAL

## 2025-02-24 DIAGNOSIS — M25.562 BILATERAL CHRONIC KNEE PAIN: ICD-10-CM

## 2025-02-24 DIAGNOSIS — M25.572 CHRONIC PAIN OF LEFT ANKLE: ICD-10-CM

## 2025-02-24 DIAGNOSIS — G89.29 BILATERAL CHRONIC KNEE PAIN: ICD-10-CM

## 2025-02-24 DIAGNOSIS — M25.561 BILATERAL CHRONIC KNEE PAIN: ICD-10-CM

## 2025-02-24 DIAGNOSIS — G89.29 CHRONIC PAIN OF LEFT ANKLE: ICD-10-CM

## 2025-02-24 PROCEDURE — 97110 THERAPEUTIC EXERCISES: CPT | Mod: GP,CQ

## 2025-02-24 ASSESSMENT — PAIN SCALES - GENERAL: PAINLEVEL_OUTOF10: 2

## 2025-02-24 ASSESSMENT — PAIN - FUNCTIONAL ASSESSMENT: PAIN_FUNCTIONAL_ASSESSMENT: 0-10

## 2025-02-24 NOTE — PROGRESS NOTES
"Physical Therapy Treatment    Patient Name: Nubia Martinez  MRN: [id number]  Today's Date: 2/24/2025  Time Calculation  Start Time: 1215  Stop Time: 1255  Time Calculation (min): 40 min  PT Therapeutic Procedures Time Entry  Therapeutic Exercise Time Entry: 38         Current Problem  Problem List Items Addressed This Visit             ICD-10-CM    Chronic pain of left ankle M25.572, G89.29    Bilateral chronic knee pain M25.561, M25.562, G89.29       Subjective   Pt.'s name and birthday confirmed.  Pt. Is compliant with HEP.  Pt. With 1/10 pain with knees with walking.  Pain increases greatly with using stairs.  Pt. Has 2/10 left ankle pain.      Reason for Referral: B/L knee and L ankle issue  Referred By: Dr. Neri  General Comment: Visit # 2/10      Precautions  Precautions  Precautions Comment: Low Fall Risk.   PMH:  DM, increased cholesterol, hx of basal cell carcinoma (NO active CA), fatty liver      Pain  Pain Assessment: 0-10  0-10 (Numeric) Pain Score: 2  Pain Type: Chronic pain  Pain Location: Knee  Pain Orientation: Right, Left    Objective:  Treatments:  Focus on strength, ROM/flexibility and proprioception of B/L Knees and L ankle.  Ther Ex: 38 mins  Resistive Ankle DF/Inv/Ev   Green Band 2 X10 (P)   DF Strap Stretch  30 sec hold x 3  Seated ankle ABC's x1 cycle  Ankle PROM all planes     LAQ's x10 b/l (N)  HS curls green band x10 b/l (N)  Seated ball squeezes x10 (N)  Seated hip abd green band x10 (N)  Seated hamstring stretches x3  20\" hold B (N)  Step ups 4\" x10 R/L (N)  Slant board x2  30\" (N)          OP EDUCATION:  Access Code: [email or web address]  URL: [email or web address]  Date: 02/24/2025  Prepared by: Melissa Bustos    Exercises  - Seated Ankle Alphabet  - 1 x daily - 7 x weekly - 2 sets - 10 reps  - Seated Long Arc Quad  - 1 x daily - 7 x weekly - 2 sets - 10 reps  - Seated Hamstring Curl with Anchored Resistance  - 1 x daily - 7 x weekly - 2 sets - 10 reps  - Seated Hip Adduction " "Isometrics with Ball  - 1 x daily - 7 x weekly - 2 sets - 10 reps  - Seated Hip Abduction with Resistance  - 1 x daily - 7 x weekly - 2 sets - 10 reps  - Seated Hamstring Stretch  - 1 x daily - 7 x weekly - 1 sets - 3 reps - 20\" hold  - Step Up  - 1 x daily - 7 x weekly - 2 sets - 10 reps  - Slant Board Gastrocnemius Stretch  - 1 x daily - 7 x weekly - 1 sets - 2 reps - 30\" hold             Assessment:  Fair tolerance with TE.  Pt. Could feel sx.'s in the left knee with L ankle work.  Crepitus noted with L knee vs R knee.  Trial slant board and hamstring stretches which patient is very tight.  Handout provided and reviewed with patient.         Plan:  Continue with strengthening, ROM and flexibility per tolerance to improve ambulation and climbing stairs with little to no difficulty.  MB       OP PT Plan  Treatment/Interventions: Cryotherapy, Education/ Instruction, Hot pack, Gait training, Manual therapy, Neuromuscular re-education, Therapeutic exercises  PT Plan: Skilled PT  PT Frequency:  (2x/wk for 5 weeks or 11 sessions)  Rehab Potential: Good  Plan of Care Agreement: Patient (Patient Goal:  Improve pain and get around better with less pain.)              Goals:  Active       PT Problem       PT Goal 1       Start:  25    Expected End:  25       LTG's:    1) Improve  B/L knee and L ankle strength from 4/5  to >= 5-/5 throughout in order to facilitate safe gait and mobility.      4-6 wks      2) Improve  L ankle DF PROM from  6 deg DF to >= 12 deg DF deg in order to facilitate safe gait and mobility.        4-6 wks      3) Improve L ankle and B/L knee pain from   6/10 to <= 0-3/10 with activity in order to improve QOL.         4-6 wks      4) Improve LFES score by >= 5 points in order to improve QOL.      4-6 wks      5) Pt will be able to walk longer distances, negotiate stairs and return to exercise and work around the home or on the job without significant limitation.      4-6 wks        ST) " Pt/caregiver will be I and consistent with HEP with use of handout as needed in order to maximize strength and flexibility.      2-3 wks

## 2025-02-25 ENCOUNTER — APPOINTMENT (OUTPATIENT)
Dept: PHYSICAL THERAPY | Facility: CLINIC | Age: 50
End: 2025-02-25
Payer: COMMERCIAL

## 2025-02-27 ENCOUNTER — TREATMENT (OUTPATIENT)
Dept: PHYSICAL THERAPY | Facility: CLINIC | Age: 50
End: 2025-02-27
Payer: COMMERCIAL

## 2025-02-27 ENCOUNTER — APPOINTMENT (OUTPATIENT)
Dept: PHYSICAL THERAPY | Facility: CLINIC | Age: 50
End: 2025-02-27
Payer: COMMERCIAL

## 2025-02-27 DIAGNOSIS — M25.561 BILATERAL CHRONIC KNEE PAIN: ICD-10-CM

## 2025-02-27 DIAGNOSIS — G89.29 BILATERAL CHRONIC KNEE PAIN: ICD-10-CM

## 2025-02-27 DIAGNOSIS — M25.562 BILATERAL CHRONIC KNEE PAIN: ICD-10-CM

## 2025-02-27 DIAGNOSIS — G89.29 CHRONIC PAIN OF LEFT ANKLE: ICD-10-CM

## 2025-02-27 DIAGNOSIS — M25.572 CHRONIC PAIN OF LEFT ANKLE: ICD-10-CM

## 2025-02-27 PROCEDURE — 97110 THERAPEUTIC EXERCISES: CPT | Mod: GP,CQ

## 2025-02-27 ASSESSMENT — PAIN - FUNCTIONAL ASSESSMENT: PAIN_FUNCTIONAL_ASSESSMENT: 0-10

## 2025-02-27 ASSESSMENT — PAIN SCALES - GENERAL
PAINLEVEL_OUTOF10: 4
PAINLEVEL_OUTOF10: 2

## 2025-02-27 NOTE — PROGRESS NOTES
"Physical Therapy Treatment    Patient Name: Nubia Martinez  MRN: [id number]  Today's Date: 2/27/2025  Time Calculation  Start Time: 0915  Stop Time: 0956  Time Calculation (min): 41 min  PT Therapeutic Procedures Time Entry  Therapeutic Exercise Time Entry: 39       Assessment:   Patient identified by name and date of birth. Patient was able to progress with reps for increased strengthening. She demonstrated minimal sway with SLS will progress with surface next treatment.     OBJECTIVE   Patient was able to demonstrate 20\" SLS on ea LE w/o finger touch down.   Plan:  OP PT Plan  Treatment/Interventions: Cryotherapy, Education/ Instruction, Hot pack, Gait training, Manual therapy, Neuromuscular re-education, Therapeutic exercises  PT Plan: Skilled PT  PT Frequency:  (2x/wk for 5 weeks or 11 sessions)  Rehab Potential: Good  Plan of Care Agreement: Patient (Patient Goal:  Improve pain and get around better with less pain.)  Continue with progression of ankle and knee/LE strengthening and ROM for increased ease with stair ambulation.   Current Problem  Problem List Items Addressed This Visit             ICD-10-CM    Chronic pain of left ankle M25.572, G89.29    Bilateral chronic knee pain M25.561, M25.562, G89.29       Subjective Patient reported compliance with % of the time and verbalized understanding. She reported 2/10 ankle and 2/10 B knee pain after treatment.   General  Reason for Referral: B/L knee and L ankle issue  Referred By: Dr. Neri  General Comment: Visit # 3/10  Precautions  Precautions  Precautions Comment: Low Fall Risk.   PMH:  DM, increased cholesterol, hx of basal cell carcinoma (NO active CA), fatty liver    Pain  Pain Assessment: 0-10  0-10 (Numeric) Pain Score: 2  Pain Type: Chronic pain  Pain Location: Knee  Pain Orientation: Right, Left  Multiple Pain Sites: Two     Treatments:  Therapeutic Exercise  Therapeutic Exercise Performed: Yes  Focus on strength, ROM/flexibility and proprioception " "of B/L Knees and L ankle.  Ther Ex:   Bike 5'   Slant board x2  30\"   Step ups 6\" 2x10 R/L (P)  SLS 3 x 20\" ea LE (N)  LAQ's 2x10 b/l (P)  HS curls mint green band 2 x 10 b/l (P)  Seated ball squeezes 2 x 10 3\" hold (P)  Seated hip abd mint green band 2 x 10   Seated hamstring stretches x3  20\" hold B (N)  Resistive Ankle DF/Inv/Ev  Green Band 2 X10     Not this date;  DF Strap Stretch  30 sec hold x 3 HEP   Seated ankle ABC's x1 cycle HEP  Ankle PROM all planes         OP EDUCATION:  Access Code: [email or web address]  URL: [email or web address]  Date: 02/24/2025  Prepared by: Melissa Bustos     Exercises  - Seated Ankle Alphabet  - 1 x daily - 7 x weekly - 2 sets - 10 reps  - Seated Long Arc Quad  - 1 x daily - 7 x weekly - 2 sets - 10 reps  - Seated Hamstring Curl with Anchored Resistance  - 1 x daily - 7 x weekly - 2 sets - 10 reps  - Seated Hip Adduction Isometrics with Ball  - 1 x daily - 7 x weekly - 2 sets - 10 reps  - Seated Hip Abduction with Resistance  - 1 x daily - 7 x weekly - 2 sets - 10 reps  - Seated Hamstring Stretch  - 1 x daily - 7 x weekly - 1 sets - 3 reps - 20\" hold  - Step Up  - 1 x daily - 7 x weekly - 2 sets - 10 reps  - Slant Board Gastrocnemius Stretch  - 1 x daily - 7 x weekly - 1 sets - 2 reps - 30\" hold      Goals:  Active       PT Problem       PT Goal 1       Start:  02/18/25    Expected End:  04/18/25       LTG's:    1) Improve  B/L knee and L ankle strength from 4/5  to >= 5-/5 throughout in order to facilitate safe gait and mobility.      4-6 wks      2) Improve  L ankle DF PROM from  6 deg DF to >= 12 deg DF deg in order to facilitate safe gait and mobility.        4-6 wks      3) Improve L ankle and B/L knee pain from   6/10 to <= 0-3/10 with activity in order to improve QOL.         4-6 wks      4) Improve LFES score by >= 5 points in order to improve QOL.      4-6 wks      5) Pt will be able to walk longer distances, negotiate stairs and return to exercise and work " around the home or on the job without significant limitation.      4-6 wks        ST) Pt/caregiver will be I and consistent with HEP with use of handout as needed in order to maximize strength and flexibility.      2-3 wks

## 2025-03-03 ENCOUNTER — TREATMENT (OUTPATIENT)
Dept: PHYSICAL THERAPY | Facility: CLINIC | Age: 50
End: 2025-03-03
Payer: COMMERCIAL

## 2025-03-03 DIAGNOSIS — M25.561 BILATERAL CHRONIC KNEE PAIN: ICD-10-CM

## 2025-03-03 DIAGNOSIS — M25.562 BILATERAL CHRONIC KNEE PAIN: ICD-10-CM

## 2025-03-03 DIAGNOSIS — G89.29 CHRONIC PAIN OF LEFT ANKLE: ICD-10-CM

## 2025-03-03 DIAGNOSIS — G89.29 BILATERAL CHRONIC KNEE PAIN: ICD-10-CM

## 2025-03-03 DIAGNOSIS — M25.572 CHRONIC PAIN OF LEFT ANKLE: ICD-10-CM

## 2025-03-03 PROCEDURE — 97110 THERAPEUTIC EXERCISES: CPT | Mod: GP,CQ

## 2025-03-03 ASSESSMENT — PAIN SCALES - GENERAL: PAINLEVEL_OUTOF10: 6

## 2025-03-03 ASSESSMENT — PAIN - FUNCTIONAL ASSESSMENT: PAIN_FUNCTIONAL_ASSESSMENT: 0-10

## 2025-03-03 NOTE — PROGRESS NOTES
Physical Therapy Treatment    Patient Name: Nubia Martinez  MRN: [id number]  Today's Date: 3/3/2025  Time Calculation  Start Time: 0830  Stop Time: 0911  Time Calculation (min): 41 min  PT Therapeutic Procedures Time Entry  Therapeutic Exercise Time Entry: 39       Assessment:   Patient luis's fatigue with standing PRE's. Able to tolerate SLS without reliance of UE however demo's ankle sway during trial. Patient demo's no change in symptoms pre and post session.     Plan:  Continue to work on improving strength and decreasing pain to be able to ascend/descend stairs with little to no difficulty. -AB.     OP PT Plan  Treatment/Interventions: Cryotherapy, Education/ Instruction, Hot pack, Gait training, Manual therapy, Neuromuscular re-education, Therapeutic exercises  PT Plan: Skilled PT  PT Frequency:  (2x/wk for 5 weeks or 11 sessions)  Rehab Potential: Good  Plan of Care Agreement: Patient (Patient Goal:  Improve pain and get around better with less pain.)    Current Problem  Problem List Items Addressed This Visit             ICD-10-CM    Chronic pain of left ankle M25.572, G89.29    Bilateral chronic knee pain M25.561, M25.562, G89.29       Subjective   General  Reason for Referral: B/L knee and L ankle issue  Referred By: Dr. Neri  General Comment: Visit # 4/10  Visit: 4  HEP: Yes  Patient states that going up and down stairs is painful right now. States that both of her LE's are bothering her. States that the pain tends to get worse as the day goes on, reports that mornings are not as bad as longer in the day.     Precautions  Precautions  Precautions Comment: Low Fall Risk.   PMH:  DM, increased cholesterol, hx of basal cell carcinoma (NO active CA), fatty liver    Pain  Pain Assessment: 0-10  0-10 (Numeric) Pain Score: 6  Pain Type: Chronic pain  Pain Location: Knee  Pain Orientation: Right, Left    Objective     Treatments:  Focus on strength, ROM/flexibility and proprioception of B/L Knees and L ankle.  Ther  "Ex:   Bike 5'   Slant board x2  30\"   Step ups 6\" 2x10 R/L (P)  SLS 3 x 20\" ea LE (N)  LAQ's 2x10 b/l (P)  Seated marches Mint Green Tband 2x10 (N)  HS curls mint green band 2 x 10 b/l   Seated ball squeezes 2 x 10 3\" hold   Seated hip abd mint green band 2 x 10   Seated hamstring stretches x3  30\" hold B   Resistive Ankle DF/Inv/Ev  Green Band 2 X10     Not this date;  DF Strap Stretch  30 sec hold x 3 HEP   Seated ankle ABC's x1 cycle HEP  Ankle PROM all planes    OP EDUCATION:   Access Code: [email or web address]  URL: [email or web address]  Date: 02/24/2025  Prepared by: Melissa Bustos     Exercises  - Seated Ankle Alphabet  - 1 x daily - 7 x weekly - 2 sets - 10 reps  - Seated Long Arc Quad  - 1 x daily - 7 x weekly - 2 sets - 10 reps  - Seated Hamstring Curl with Anchored Resistance  - 1 x daily - 7 x weekly - 2 sets - 10 reps  - Seated Hip Adduction Isometrics with Ball  - 1 x daily - 7 x weekly - 2 sets - 10 reps  - Seated Hip Abduction with Resistance  - 1 x daily - 7 x weekly - 2 sets - 10 reps  - Seated Hamstring Stretch  - 1 x daily - 7 x weekly - 1 sets - 3 reps - 20\" hold  - Step Up  - 1 x daily - 7 x weekly - 2 sets - 10 reps  - Slant Board Gastrocnemius Stretch  - 1 x daily - 7 x weekly - 1 sets - 2 reps - 30\" hold    Goals:  Active       PT Problem       PT Goal 1       Start:  02/18/25    Expected End:  04/18/25       LTG's:    1) Improve  B/L knee and L ankle strength from 4/5  to >= 5-/5 throughout in order to facilitate safe gait and mobility.      4-6 wks      2) Improve  L ankle DF PROM from  6 deg DF to >= 12 deg DF deg in order to facilitate safe gait and mobility.        4-6 wks      3) Improve L ankle and B/L knee pain from   6/10 to <= 0-3/10 with activity in order to improve QOL.         4-6 wks      4) Improve LFES score by >= 5 points in order to improve QOL.      4-6 wks      5) Pt will be able to walk longer distances, negotiate stairs and return to exercise and work around " the home or on the job without significant limitation.      4-6 wks        ST) Pt/caregiver will be I and consistent with HEP with use of handout as needed in order to maximize strength and flexibility.      2-3 wks

## 2025-03-04 ENCOUNTER — APPOINTMENT (OUTPATIENT)
Dept: PHYSICAL THERAPY | Facility: CLINIC | Age: 50
End: 2025-03-04
Payer: COMMERCIAL

## 2025-03-06 ENCOUNTER — APPOINTMENT (OUTPATIENT)
Dept: PHYSICAL THERAPY | Facility: CLINIC | Age: 50
End: 2025-03-06
Payer: COMMERCIAL

## 2025-03-06 ENCOUNTER — TREATMENT (OUTPATIENT)
Dept: PHYSICAL THERAPY | Facility: CLINIC | Age: 50
End: 2025-03-06
Payer: COMMERCIAL

## 2025-03-06 DIAGNOSIS — M25.561 BILATERAL CHRONIC KNEE PAIN: ICD-10-CM

## 2025-03-06 DIAGNOSIS — M25.572 CHRONIC PAIN OF LEFT ANKLE: ICD-10-CM

## 2025-03-06 DIAGNOSIS — G89.29 BILATERAL CHRONIC KNEE PAIN: ICD-10-CM

## 2025-03-06 DIAGNOSIS — M25.562 BILATERAL CHRONIC KNEE PAIN: ICD-10-CM

## 2025-03-06 DIAGNOSIS — G89.29 CHRONIC PAIN OF LEFT ANKLE: ICD-10-CM

## 2025-03-06 PROCEDURE — 97110 THERAPEUTIC EXERCISES: CPT | Mod: GP,CQ

## 2025-03-06 ASSESSMENT — PAIN SCALES - GENERAL
PAINLEVEL_OUTOF10: 5 - MODERATE PAIN
PAINLEVEL_OUTOF10: 5 - MODERATE PAIN

## 2025-03-06 ASSESSMENT — PAIN - FUNCTIONAL ASSESSMENT: PAIN_FUNCTIONAL_ASSESSMENT: 0-10

## 2025-03-06 NOTE — PROGRESS NOTES
"Physical Therapy Treatment    Patient Name: Nubia Martinez  MRN: [id number]  Today's Date: 3/6/2025  Time Calculation  Start Time: 0917  Stop Time: 1002  Time Calculation (min): 45 min  PT Therapeutic Procedures Time Entry  Therapeutic Exercise Time Entry: 43       Assessment:   Patient identified by name and date of birth. Progressed with LE strengthening this date she demonstrated quick fatigue. She was able to progress with surface with SLS w/o LOB.     OBJECTIVE     Plan:  OP PT Plan  Treatment/Interventions: Cryotherapy, Education/ Instruction, Hot pack, Gait training, Manual therapy, Neuromuscular re-education, Therapeutic exercises  PT Plan: Skilled PT  PT Frequency:  (2x/wk for 5 weeks or 11 sessions)  Rehab Potential: Good  Plan of Care Agreement: Patient (Patient Goal:  Improve pain and get around better with less pain.)  Continue with progression of L ankle and B knee ROM and strengthening.   Current Problem  Problem List Items Addressed This Visit             ICD-10-CM    Chronic pain of left ankle M25.572, G89.29    Bilateral chronic knee pain M25.561, M25.562, G89.29       Subjective Patient reported compliance with % of the time and verbalized understanding.  Patient reported she has experienced more knee pain since start of therapy. She reported she has experienced patella subluxations in the past. She reported no pain with muscle fatigue after treatment.   General  Reason for Referral: B/L knee and L ankle issue  Referred By: Dr. Neri  General Comment: Visit # 5/10  Precautions  Precautions  Precautions Comment: Low Fall Risk.   PMH:  DM, increased cholesterol, hx of basal cell carcinoma (NO active CA), fatty liver    Pain  Pain Assessment: 0-10  0-10 (Numeric) Pain Score: 5 - Moderate pain  Pain Type: Chronic pain  Pain Location: Knee  Pain Orientation: Right, Left     Treatments:  Therapeutic Exercise  Therapeutic Exercise Performed: Yes  Bike 5'   Slant board x1 1'    Step ups fwd 6\" 2x10 R/L " "  Step ups lateral x10 4\" (N)  SLS 1 x 20\"on floor, 2 x 20 on airex  ea LE (P)  S/L hip abd, add 2 x 10 (N)  SLR 2 x 10 (N)  Bridge 2 x 10 (N)  Seated hamstring stretches x3  30\" hold B   LAQ's 2x10 b/l (x time)  Seated marches Mint Green Tband 2x10 (x time)  HS curls mint green band 2 x 10 b/l (X time)  Seated ball squeezes 2 x 10 3\" hold (x time)  Seated hip abd light blue band 2 x 10 (P)  Resistive Ankle DF/Inv/Ev  light blue Band 2 x10 (P)     Not this date;  DF Strap Stretch  30 sec hold x 3 HEP   Seated ankle ABC's x1 cycle HEP  Ankle PROM all planes     OP EDUCATION:  Access Code: [email or web address]  URL: [email or web address]  Date: 03/06/2025  Prepared by: Frida Villela    Exercises  - Active Straight Leg Raise with Quad Set  - 1 x daily - 7 x weekly - 2 sets - 10 reps  - Sidelying Hip Abduction  - 1 x daily - 7 x weekly - 2 sets - 10 reps  - Sidelying Hip Adduction  - 1 x daily - 7 x weekly - 2 sets - 10 reps  - Supine Bridge  - 1 x daily - 7 x weekly - 2 sets - 10 reps   Access Code: [email or web address]  URL: [email or web address]  Date: 02/24/2025  Prepared by: Melissa Bustos     Exercises  - Seated Ankle Alphabet  - 1 x daily - 7 x weekly - 2 sets - 10 reps  - Seated Long Arc Quad  - 1 x daily - 7 x weekly - 2 sets - 10 reps  - Seated Hamstring Curl with Anchored Resistance  - 1 x daily - 7 x weekly - 2 sets - 10 reps  - Seated Hip Adduction Isometrics with Ball  - 1 x daily - 7 x weekly - 2 sets - 10 reps  - Seated Hip Abduction with Resistance  - 1 x daily - 7 x weekly - 2 sets - 10 reps  - Seated Hamstring Stretch  - 1 x daily - 7 x weekly - 1 sets - 3 reps - 20\" hold  - Step Up  - 1 x daily - 7 x weekly - 2 sets - 10 reps  - Slant Board Gastrocnemius Stretch  - 1 x daily - 7 x weekly - 1 sets - 2 reps - 30\" hold       Goals:  Active       PT Problem       PT Goal 1       Start:  02/18/25    Expected End:  04/18/25       LTG's:    1) Improve  B/L knee and L ankle strength " from 4/5  to >= 5-/5 throughout in order to facilitate safe gait and mobility.      4-6 wks      2) Improve  L ankle DF PROM from  6 deg DF to >= 12 deg DF deg in order to facilitate safe gait and mobility.        4-6 wks      3) Improve L ankle and B/L knee pain from   10 to <= 0-3/10 with activity in order to improve QOL.         4-6 wks      4) Improve LFES score by >= 5 points in order to improve QOL.      4-6 wks      5) Pt will be able to walk longer distances, negotiate stairs and return to exercise and work around the home or on the job without significant limitation.      4-6 wks        ST) Pt/caregiver will be I and consistent with HEP with use of handout as needed in order to maximize strength and flexibility.      2-3 wks

## 2025-03-10 ENCOUNTER — TREATMENT (OUTPATIENT)
Dept: PHYSICAL THERAPY | Facility: CLINIC | Age: 50
End: 2025-03-10
Payer: COMMERCIAL

## 2025-03-10 DIAGNOSIS — M25.572 CHRONIC PAIN OF LEFT ANKLE: ICD-10-CM

## 2025-03-10 DIAGNOSIS — G89.29 BILATERAL CHRONIC KNEE PAIN: ICD-10-CM

## 2025-03-10 DIAGNOSIS — M25.562 BILATERAL CHRONIC KNEE PAIN: ICD-10-CM

## 2025-03-10 DIAGNOSIS — G89.29 CHRONIC PAIN OF LEFT ANKLE: ICD-10-CM

## 2025-03-10 DIAGNOSIS — M25.561 BILATERAL CHRONIC KNEE PAIN: ICD-10-CM

## 2025-03-10 PROCEDURE — 97110 THERAPEUTIC EXERCISES: CPT | Mod: GP,CQ

## 2025-03-10 ASSESSMENT — PAIN - FUNCTIONAL ASSESSMENT: PAIN_FUNCTIONAL_ASSESSMENT: 0-10

## 2025-03-10 ASSESSMENT — PAIN SCALES - GENERAL: PAINLEVEL_OUTOF10: 3

## 2025-03-10 NOTE — PROGRESS NOTES
Physical Therapy Treatment    Patient Name: Nubia Martinez  MRN: [id number]  Today's Date: 3/10/2025  Time Calculation  Start Time: 0915  Stop Time: 1000  Time Calculation (min): 45 min  PT Therapeutic Procedures Time Entry  Therapeutic Exercise Time Entry: 43       Assessment:  Reviewed HEP and patient demo'd good understanding. Demo's good knee concentric/eccentric control with fwd/lateral step ups. Able to tolerate increased resistance with PRE's. Demo's fatigue at end of session. Zeny Bridges, AARON All clinical decision making and treatment directly supervised by Carmen Chapin PTA.        Plan:  Continue improving strength and decreasing pain to ascend/descend stairs with little to no difficulty.     OP PT Plan  Treatment/Interventions: Cryotherapy, Education/ Instruction, Hot pack, Gait training, Manual therapy, Neuromuscular re-education, Therapeutic exercises  PT Plan: Skilled PT  PT Frequency:  (2x/wk for 5 weeks or 11 sessions)  Rehab Potential: Good  Plan of Care Agreement: Patient (Patient Goal:  Improve pain and get around better with less pain.)    Current Problem  Problem List Items Addressed This Visit             ICD-10-CM    Chronic pain of left ankle M25.572, G89.29    Bilateral chronic knee pain M25.561, M25.562, G89.29       Subjective   General  Reason for Referral: B/L knee and L ankle issue  Referred By: Dr. Neri  General Comment: Visit # 6/10  Visit: 6  HEP: yes  Patient states she is complaint with her HEP . States she is sore today from her exercises. Reports she had 7/10 pain yesterday at rest after being on her feet all day. Reports pain in L lateral knee 6/10 with fwd stepping down. States SLR is easier     Precautions  Precautions  Precautions Comment: Low Fall Risk.   PMH:  DM, increased cholesterol, hx of basal cell carcinoma (NO active CA), fatty liver    Pain  Pain Assessment: 0-10  0-10 (Numeric) Pain Score: 3  Pain Type: Chronic pain  Pain Location: Ankle  Pain Orientation: Right,  "Left    Objective     Treatments:  Therapeutic exercise: 43 min, 3 units  Bike 5'   Slant board x2 1' (P, reps)  Step ups fwd 6\" 2x10 R/L   Step ups lateral x10 4\"   SLS 2 x 30\" on airex ea LE (P, time)  S/L hip abd, add 2 x 10   SLR 2 x 10   Bridge 2 x 10   Seated hamstring stretches x3  30\" hold B   Seated marches light blue Tband 2x10 (P, resistance)  HS curls light blue band 2 x 10 b/l (P, resistance)  Seated hip abd light blue band 2 x 10   Seated ball squeezes 2 x 10 5\" hold (P, hold)  LAQ's 2x10 b/l   Resistive Ankle DF/Inv/Ev  light blue Band 2 x10 (X)     Not this date;  DF Strap Stretch  30 sec hold x 3 HEP   Seated ankle ABC's x1 cycle HEP  Ankle PROM all planes    OP EDUCATION:   Access Code: [email or web address]  URL: [email or web address]  Date: 03/06/2025  Prepared by: Frida Villela    Exercises  - Active Straight Leg Raise with Quad Set  - 1 x daily - 7 x weekly - 2 sets - 10 reps  - Sidelying Hip Abduction  - 1 x daily - 7 x weekly - 2 sets - 10 reps  - Sidelying Hip Adduction  - 1 x daily - 7 x weekly - 2 sets - 10 reps  - Supine Bridge  - 1 x daily - 7 x weekly - 2 sets - 10 reps   Access Code: [email or web address]  URL: [email or web address]  Date: 02/24/2025  Prepared by: Melissa Bustos     Exercises  - Seated Ankle Alphabet  - 1 x daily - 7 x weekly - 2 sets - 10 reps  - Seated Long Arc Quad  - 1 x daily - 7 x weekly - 2 sets - 10 reps  - Seated Hamstring Curl with Anchored Resistance  - 1 x daily - 7 x weekly - 2 sets - 10 reps  - Seated Hip Adduction Isometrics with Ball  - 1 x daily - 7 x weekly - 2 sets - 10 reps  - Seated Hip Abduction with Resistance  - 1 x daily - 7 x weekly - 2 sets - 10 reps  - Seated Hamstring Stretch  - 1 x daily - 7 x weekly - 1 sets - 3 reps - 20\" hold  - Step Up  - 1 x daily - 7 x weekly - 2 sets - 10 reps  - Slant Board Gastrocnemius Stretch  - 1 x daily - 7 x weekly - 1 sets - 2 reps - 30\" hold    Goals:  Active       PT Problem       PT " Goal 1       Start:  25    Expected End:  25       LTG's:    1) Improve  B/L knee and L ankle strength from 4/5  to >= 5-/5 throughout in order to facilitate safe gait and mobility.      4-6 wks      2) Improve  L ankle DF PROM from  6 deg DF to >= 12 deg DF deg in order to facilitate safe gait and mobility.        4-6 wks      3) Improve L ankle and B/L knee pain from   6/10 to <= 0-3/10 with activity in order to improve QOL.         4-6 wks      4) Improve LFES score by >= 5 points in order to improve QOL.      4-6 wks      5) Pt will be able to walk longer distances, negotiate stairs and return to exercise and work around the home or on the job without significant limitation.      4-6 wks        ST) Pt/caregiver will be I and consistent with HEP with use of handout as needed in order to maximize strength and flexibility.      2-3 wks

## 2025-03-11 ENCOUNTER — APPOINTMENT (OUTPATIENT)
Dept: PHYSICAL THERAPY | Facility: CLINIC | Age: 50
End: 2025-03-11
Payer: COMMERCIAL

## 2025-03-13 ENCOUNTER — APPOINTMENT (OUTPATIENT)
Dept: PHYSICAL THERAPY | Facility: CLINIC | Age: 50
End: 2025-03-13
Payer: COMMERCIAL

## 2025-03-13 ENCOUNTER — TREATMENT (OUTPATIENT)
Dept: PHYSICAL THERAPY | Facility: CLINIC | Age: 50
End: 2025-03-13
Payer: COMMERCIAL

## 2025-03-13 DIAGNOSIS — G89.29 BILATERAL CHRONIC KNEE PAIN: ICD-10-CM

## 2025-03-13 DIAGNOSIS — M25.562 BILATERAL CHRONIC KNEE PAIN: ICD-10-CM

## 2025-03-13 DIAGNOSIS — G89.29 CHRONIC PAIN OF LEFT ANKLE: ICD-10-CM

## 2025-03-13 DIAGNOSIS — M25.572 CHRONIC PAIN OF LEFT ANKLE: ICD-10-CM

## 2025-03-13 DIAGNOSIS — M25.561 BILATERAL CHRONIC KNEE PAIN: ICD-10-CM

## 2025-03-13 PROCEDURE — 97110 THERAPEUTIC EXERCISES: CPT | Mod: GP

## 2025-03-13 ASSESSMENT — PAIN SCALES - GENERAL: PAINLEVEL_OUTOF10: 6

## 2025-03-13 ASSESSMENT — PAIN - FUNCTIONAL ASSESSMENT: PAIN_FUNCTIONAL_ASSESSMENT: 0-10

## 2025-03-13 NOTE — PROGRESS NOTES
"Physical Therapy    Physical Therapy Treatment    Patient Name: Nubia Martinez  MRN: [id number]  : 1975   Evelio Neri  Today's Date: 3/13/2025  Time Calculation  Start Time: 0900  Stop Time: 09  Time Calculation (min): 44 min     PT Therapeutic Procedures Time Entry  Therapeutic Exercise Time Entry: 43                   Current Problem  Problem List Items Addressed This Visit             ICD-10-CM    Chronic pain of left ankle M25.572, G89.29    Bilateral chronic knee pain M25.561, M25.562, G89.29        General  Reason for Referral: B/L knee and L ankle issue  Referred By: Dr. Neri  General Comment: Visit # 7/10  Visit # 7    Subjective   Patient reports compliance with HEP. Patient reports her legs have felt like jelly lately, she has performed some of the exercises but not all of them. Has iced the ankle and that helped. Sore from last session and exercises at home. Going up and down stairs is more difficult now than when she started, feels like her knees are going to give out.     Precautions  Precautions  Precautions Comment: Low Fall Risk.   PMH:  DM, increased cholesterol, hx of basal cell carcinoma (NO active CA), fatty liver    Pain  Pain Assessment: 0-10  0-10 (Numeric) Pain Score: 6  Pain Type: Chronic pain  Pain Location: Knee  Pain Orientation: Right, Left    Objective       Treatments:  Therapeutic exercise: 43 min, 3 units  Bike 5'   Slant board x2 1' (P, reps)  Step ups fwd 6\" 2x10 R/L   Step ups lateral x10 6\" (P)  Lateral stepping with yellow loop x4 trips (N)   SLS 2 x 30\" on airex ea LE (P, time)  Tandem stance on airex 2x30 sec (N)   LAQ's with ball between knees 2x10 b/l (P)  HS curls light blue band 2 x 10 b/l (P, resistance)  SLR 2 x 10   Bridge with ball 2 x 10 (P)  Supine SAQ x10 each (N)   Hooklying ball squeezes  10 5\" hold       Seated hamstring stretches x3  30\" hold B   Seated marches light blue Tband 2x10 (P, resistance)  Seated hip abd light blue band 2 x 10   Resistive Ankle " "DF/Inv/Ev  light blue Band 2 x10 (X)     Not this date;  S/L hip abd, add 2 x 10   DF Strap Stretch  30 sec hold x 3 HEP   Seated ankle ABC's x1 cycle HEP  Ankle PROM all planes    OP Education  Access Code: [email or web address]  URL: [email or web address]  Date: 2025  Prepared by: Carmen Bray    Exercises  - Tandem Stance with Chair Support  - 1 x daily - 7 x weekly - 3 sets - 10 reps  - Side Stepping with Resistance at Ankles and Counter Support  - 1 x daily - 7 x weekly - 3 sets - 10 reps     Access Code: [email or web address]  URL: [email or web address]  Date: 2025  Prepared by: Frida Villela     Exercises  - Active Straight Leg Raise with Quad Set  - 1 x daily - 7 x weekly - 2 sets - 10 reps  - Sidelying Hip Abduction  - 1 x daily - 7 x weekly - 2 sets - 10 reps  - Sidelying Hip Adduction  - 1 x daily - 7 x weekly - 2 sets - 10 reps  - Supine Bridge  - 1 x daily - 7 x weekly - 2 sets - 10 reps   Access Code: [email or web address]  URL: [email or web address]  Date: 2025  Prepared by: Melissa Bustos     Exercises  - Seated Ankle Alphabet  - 1 x daily - 7 x weekly - 2 sets - 10 reps  - Seated Long Arc Quad  - 1 x daily - 7 x weekly - 2 sets - 10 reps  - Seated Hamstring Curl with Anchored Resistance  - 1 x daily - 7 x weekly - 2 sets - 10 reps  - Seated Hip Adduction Isometrics with Ball  - 1 x daily - 7 x weekly - 2 sets - 10 reps  - Seated Hip Abduction with Resistance  - 1 x daily - 7 x weekly - 2 sets - 10 reps  - Seated Hamstring Stretch  - 1 x daily - 7 x weekly - 1 sets - 3 reps - 20\" hold  - Step Up  - 1 x daily - 7 x weekly - 2 sets - 10 reps  - Slant Board Gastrocnemius Stretch  - 1 x daily - 7 x weekly - 1 sets - 2 reps - 30\" hold    Assessment  Patient verified by name and . Patient tolerates standing exercises well today, cues to keep toes neutral instead of ER LE when performing lateral stepping. Discomfort noted with SLR today on L knee, noted " some crepitus in L knee.     Plan- continue with strength, balance and functional mobility training in order to reduce pain with stairs and walking  Treatment/Interventions: Cryotherapy, Education/ Instruction, Hot pack, Gait training, Manual therapy, Neuromuscular re-education, Therapeutic exercises  PT Plan: Skilled PT  PT Frequency:  (2x/wk for 5 weeks or 11 sessions)  Rehab Potential: Good  Plan of Care Agreement: Patient (Patient Goal:  Improve pain and get around better with less pain.)    Active       PT Problem       PT Goal 1       Start:  25    Expected End:  25       LTG's:    1) Improve  B/L knee and L ankle strength from 4/5  to >= 5-/5 throughout in order to facilitate safe gait and mobility.      4-6 wks      2) Improve  L ankle DF PROM from  6 deg DF to >= 12 deg DF deg in order to facilitate safe gait and mobility.        4-6 wks      3) Improve L ankle and B/L knee pain from   6/10 to <= 0-3/10 with activity in order to improve QOL.         4-6 wks      4) Improve LFES score by >= 5 points in order to improve QOL.      4-6 wks      5) Pt will be able to walk longer distances, negotiate stairs and return to exercise and work around the home or on the job without significant limitation.      4-6 wks        ST) Pt/caregiver will be I and consistent with HEP with use of handout as needed in order to maximize strength and flexibility.      2-3 wks

## 2025-03-17 ENCOUNTER — TREATMENT (OUTPATIENT)
Dept: PHYSICAL THERAPY | Facility: CLINIC | Age: 50
End: 2025-03-17
Payer: COMMERCIAL

## 2025-03-17 DIAGNOSIS — G89.29 CHRONIC PAIN OF LEFT ANKLE: ICD-10-CM

## 2025-03-17 DIAGNOSIS — M25.562 BILATERAL CHRONIC KNEE PAIN: ICD-10-CM

## 2025-03-17 DIAGNOSIS — G89.29 BILATERAL CHRONIC KNEE PAIN: ICD-10-CM

## 2025-03-17 DIAGNOSIS — M25.561 BILATERAL CHRONIC KNEE PAIN: ICD-10-CM

## 2025-03-17 DIAGNOSIS — M25.572 CHRONIC PAIN OF LEFT ANKLE: ICD-10-CM

## 2025-03-17 PROCEDURE — 97110 THERAPEUTIC EXERCISES: CPT | Mod: GP,CQ

## 2025-03-17 ASSESSMENT — PAIN SCALES - GENERAL: PAINLEVEL_OUTOF10: 3

## 2025-03-17 ASSESSMENT — PAIN - FUNCTIONAL ASSESSMENT: PAIN_FUNCTIONAL_ASSESSMENT: 0-10

## 2025-03-17 NOTE — PROGRESS NOTES
Physical Therapy Treatment    Patient Name: Nubia Martinez  MRN: [id number]  Today's Date: 3/17/2025  Time Calculation  Start Time: 0916  Stop Time: 1001  Time Calculation (min): 45 min  PT Therapeutic Procedures Time Entry  Therapeutic Exercise Time Entry: 43       Assessment:   Demo's mild challenges with lateral step up on L. Reported pain with eccentric motions of L knee specifically SAQ and step ups. Good concentric/eccentric control of MALCOLM knee with ther ex. Demo's fatigue at end of session. Zeny Bridges, AARON All clinical decision making and treatment directly supervised by Carmen Chapin PTA.    Plan:  Continue improving strength and decreasing pain to ascend/descend stairs with little to no difficulty.     OP PT Plan  Treatment/Interventions: Cryotherapy, Education/ Instruction, Hot pack, Gait training, Manual therapy, Neuromuscular re-education, Therapeutic exercises  PT Plan: Skilled PT  PT Frequency:  (2x/wk for 5 weeks or 11 sessions)  Rehab Potential: Good  Plan of Care Agreement: Patient (Patient Goal:  Improve pain and get around better with less pain.)    Current Problem  Problem List Items Addressed This Visit             ICD-10-CM    Chronic pain of left ankle M25.572, G89.29    Bilateral chronic knee pain M25.561, M25.562, G89.29       Subjective   General  Reason for Referral: B/L knee and L ankle issue  Referred By: Dr. Neri  General Comment: Visit # 8/10  Visit: 8  HEP: yes  States she is complaint with her HEP.  Reports MALCOLM LE feel wobbly but pain is in the knees and occasionally ankles. Reports 1-2/10 pain in L knee with fwd/lat step up. Reports little shot of pain with eccentric lower of L LE during SAQ.     Precautions  Precautions  Precautions Comment: Low Fall Risk.   PMH:  DM, increased cholesterol, hx of basal cell carcinoma (NO active CA), fatty liver    Pain  Pain Assessment: 0-10  0-10 (Numeric) Pain Score: 3  Pain Type: Chronic pain  Pain Location: Knee  Pain Orientation: Right,  "Left    Objective     Treatments:  Therapeutic exercise: 43 min, 3 units  Bike 5'   Slant board x2 1'   Step ups fwd 6\" 2x10 R/L   Step ups lateral 2 x10 R/L 6\" (P, reps)  Lateral stepping with yellow loop x4 trips   Fwd/bkwd with yellow loop x2   SLS 2 x 30\" on airex ea LE   Tandem stance on airex 2x30 sec    LAQ's with ball between knees 2x10 b/l   HS curls light blue band 2 x 10 b/l   SLR 2 x 10   Bridge with ball 2 x 10   Supine SAQ 2 x10 each (P, reps)  Hooklying ball squeezes  10 5\" hold     Not today 3/17/25  Seated hamstring stretches x3  30\" hold B   Seated marches light blue Tband 2x10 (P, resistance)  Seated hip abd light blue band 2 x 10   Resistive Ankle DF/Inv/Ev  light blue Band 2 x10 (X)     Not this date;  S/L hip abd, add 2 x 10   DF Strap Stretch  30 sec hold x 3 HEP   Seated ankle ABC's x1 cycle HEP  Ankle PROM all planes    OP EDUCATION:   Access Code: [email or web address]  URL: [email or web address]  Date: 03/13/2025  Prepared by: Carmen Bray    Exercises  - Tandem Stance with Chair Support  - 1 x daily - 7 x weekly - 3 sets - 10 reps  - Side Stepping with Resistance at Ankles and Counter Support  - 1 x daily - 7 x weekly - 3 sets - 10 reps    Goals:  Active       PT Problem       PT Goal 1       Start:  02/18/25    Expected End:  04/18/25       LTG's:    1) Improve  B/L knee and L ankle strength from 4/5  to >= 5-/5 throughout in order to facilitate safe gait and mobility.      4-6 wks      2) Improve  L ankle DF PROM from  6 deg DF to >= 12 deg DF deg in order to facilitate safe gait and mobility.        4-6 wks      3) Improve L ankle and B/L knee pain from   6/10 to <= 0-3/10 with activity in order to improve QOL.         4-6 wks      4) Improve LFES score by >= 5 points in order to improve QOL.      4-6 wks      5) Pt will be able to walk longer distances, negotiate stairs and return to exercise and work around the home or on the job without significant limitation.      4-6 " wks        ST) Pt/caregiver will be I and consistent with HEP with use of handout as needed in order to maximize strength and flexibility.      2-3 wks

## 2025-03-18 ENCOUNTER — LAB (OUTPATIENT)
Dept: LAB | Facility: HOSPITAL | Age: 50
End: 2025-03-18
Payer: COMMERCIAL

## 2025-03-18 ENCOUNTER — APPOINTMENT (OUTPATIENT)
Dept: PHYSICAL THERAPY | Facility: CLINIC | Age: 50
End: 2025-03-18
Payer: COMMERCIAL

## 2025-03-18 ENCOUNTER — LAB REQUISITION (OUTPATIENT)
Dept: LAB | Facility: HOSPITAL | Age: 50
End: 2025-03-18

## 2025-03-18 DIAGNOSIS — D50.0 IRON DEFICIENCY ANEMIA SECONDARY TO BLOOD LOSS (CHRONIC): ICD-10-CM

## 2025-03-18 DIAGNOSIS — E11.65 TYPE 2 DIABETES MELLITUS WITH HYPERGLYCEMIA, WITHOUT LONG-TERM CURRENT USE OF INSULIN: ICD-10-CM

## 2025-03-18 DIAGNOSIS — D50.0 IRON DEFICIENCY ANEMIA DUE TO CHRONIC BLOOD LOSS: ICD-10-CM

## 2025-03-18 DIAGNOSIS — R74.01 ELEVATED TRANSAMINASE LEVEL: ICD-10-CM

## 2025-03-18 DIAGNOSIS — R31.29 OTHER MICROSCOPIC HEMATURIA: ICD-10-CM

## 2025-03-18 DIAGNOSIS — R74.01 ELEVATION OF LEVELS OF LIVER TRANSAMINASE LEVELS: ICD-10-CM

## 2025-03-18 DIAGNOSIS — E11.65 TYPE 2 DIABETES MELLITUS WITH HYPERGLYCEMIA (MULTI): ICD-10-CM

## 2025-03-18 LAB
ALBUMIN SERPL BCP-MCNC: 4.6 G/DL (ref 3.4–5)
ALP SERPL-CCNC: 105 U/L (ref 33–110)
ALT SERPL W P-5'-P-CCNC: 52 U/L (ref 7–45)
ANION GAP SERPL CALC-SCNC: 16 MMOL/L (ref 10–20)
APPEARANCE UR: ABNORMAL
AST SERPL W P-5'-P-CCNC: 25 U/L (ref 9–39)
BACTERIA #/AREA URNS AUTO: ABNORMAL /HPF
BASOPHILS # BLD AUTO: 0.05 X10*3/UL (ref 0–0.1)
BASOPHILS NFR BLD AUTO: 0.6 %
BILIRUB SERPL-MCNC: 0.5 MG/DL (ref 0–1.2)
BILIRUB UR STRIP.AUTO-MCNC: NEGATIVE MG/DL
BUN SERPL-MCNC: 20 MG/DL (ref 6–23)
CALCIUM SERPL-MCNC: 9.6 MG/DL (ref 8.6–10.3)
CHLORIDE SERPL-SCNC: 103 MMOL/L (ref 98–107)
CO2 SERPL-SCNC: 26 MMOL/L (ref 21–32)
COLOR UR: ABNORMAL
CREAT SERPL-MCNC: 0.72 MG/DL (ref 0.5–1.05)
EGFRCR SERPLBLD CKD-EPI 2021: >90 ML/MIN/1.73M*2
EOSINOPHIL # BLD AUTO: 0.41 X10*3/UL (ref 0–0.7)
EOSINOPHIL NFR BLD AUTO: 5.1 %
ERYTHROCYTE [DISTWIDTH] IN BLOOD BY AUTOMATED COUNT: 15.3 % (ref 11.5–14.5)
GLUCOSE SERPL-MCNC: 132 MG/DL (ref 74–99)
GLUCOSE UR STRIP.AUTO-MCNC: ABNORMAL MG/DL
HCT VFR BLD AUTO: 45.3 % (ref 36–46)
HGB BLD-MCNC: 13.3 G/DL (ref 12–16)
IMM GRANULOCYTES # BLD AUTO: 0.01 X10*3/UL (ref 0–0.7)
IMM GRANULOCYTES NFR BLD AUTO: 0.1 % (ref 0–0.9)
IRON SATN MFR SERPL: 11 % (ref 25–45)
IRON SERPL-MCNC: 47 UG/DL (ref 35–150)
KETONES UR STRIP.AUTO-MCNC: NEGATIVE MG/DL
LEUKOCYTE ESTERASE UR QL STRIP.AUTO: ABNORMAL
LYMPHOCYTES # BLD AUTO: 2.34 X10*3/UL (ref 1.2–4.8)
LYMPHOCYTES NFR BLD AUTO: 29.3 %
MCH RBC QN AUTO: 23.5 PG (ref 26–34)
MCHC RBC AUTO-ENTMCNC: 29.4 G/DL (ref 32–36)
MCV RBC AUTO: 80 FL (ref 80–100)
MONOCYTES # BLD AUTO: 0.39 X10*3/UL (ref 0.1–1)
MONOCYTES NFR BLD AUTO: 4.9 %
MUCOUS THREADS #/AREA URNS AUTO: ABNORMAL /LPF
NEUTROPHILS # BLD AUTO: 4.79 X10*3/UL (ref 1.2–7.7)
NEUTROPHILS NFR BLD AUTO: 60 %
NITRITE UR QL STRIP.AUTO: NEGATIVE
NRBC BLD-RTO: 0 /100 WBCS (ref 0–0)
PH UR STRIP.AUTO: 5 [PH]
PLATELET # BLD AUTO: 251 X10*3/UL (ref 150–450)
POTASSIUM SERPL-SCNC: 4.1 MMOL/L (ref 3.5–5.3)
PROT SERPL-MCNC: 7 G/DL (ref 6.4–8.2)
PROT UR STRIP.AUTO-MCNC: NEGATIVE MG/DL
RBC # BLD AUTO: 5.65 X10*6/UL (ref 4–5.2)
RBC # UR STRIP.AUTO: NEGATIVE MG/DL
RBC #/AREA URNS AUTO: ABNORMAL /HPF
SODIUM SERPL-SCNC: 141 MMOL/L (ref 136–145)
SP GR UR STRIP.AUTO: 1.02
SQUAMOUS #/AREA URNS AUTO: ABNORMAL /HPF
TIBC SERPL-MCNC: 444 UG/DL (ref 240–445)
UIBC SERPL-MCNC: 397 UG/DL (ref 110–370)
UROBILINOGEN UR STRIP.AUTO-MCNC: NORMAL MG/DL
WBC # BLD AUTO: 8 X10*3/UL (ref 4.4–11.3)
WBC #/AREA URNS AUTO: >50 /HPF
WBC CLUMPS #/AREA URNS AUTO: ABNORMAL /HPF
YEAST BUDDING #/AREA UR COMP ASSIST: PRESENT /HPF

## 2025-03-18 PROCEDURE — 85025 COMPLETE CBC W/AUTO DIFF WBC: CPT

## 2025-03-18 PROCEDURE — 83036 HEMOGLOBIN GLYCOSYLATED A1C: CPT

## 2025-03-18 PROCEDURE — 81001 URINALYSIS AUTO W/SCOPE: CPT

## 2025-03-18 PROCEDURE — 86381 MITOCHONDRIAL ANTIBODY EACH: CPT

## 2025-03-18 PROCEDURE — 83550 IRON BINDING TEST: CPT

## 2025-03-18 PROCEDURE — 83540 ASSAY OF IRON: CPT

## 2025-03-18 PROCEDURE — 80074 ACUTE HEPATITIS PANEL: CPT

## 2025-03-18 PROCEDURE — 80053 COMPREHEN METABOLIC PANEL: CPT

## 2025-03-19 LAB
EST. AVERAGE GLUCOSE BLD GHB EST-MCNC: 166 MG/DL
HAV IGM SER QL: NONREACTIVE
HBA1C MFR BLD: 7.4 %
HBV CORE IGM SER QL: NONREACTIVE
HBV SURFACE AG SERPL QL IA: NONREACTIVE
HCV AB SER QL: NONREACTIVE
MITOCHONDRIA AB SER QL IF: NEGATIVE

## 2025-03-20 ENCOUNTER — APPOINTMENT (OUTPATIENT)
Dept: PHYSICAL THERAPY | Facility: CLINIC | Age: 50
End: 2025-03-20
Payer: COMMERCIAL

## 2025-03-20 ENCOUNTER — APPOINTMENT (OUTPATIENT)
Age: 50
End: 2025-03-20
Payer: COMMERCIAL

## 2025-03-21 ENCOUNTER — TREATMENT (OUTPATIENT)
Dept: PHYSICAL THERAPY | Facility: CLINIC | Age: 50
End: 2025-03-21
Payer: COMMERCIAL

## 2025-03-21 DIAGNOSIS — M25.572 CHRONIC PAIN OF LEFT ANKLE: ICD-10-CM

## 2025-03-21 DIAGNOSIS — G89.29 BILATERAL CHRONIC KNEE PAIN: ICD-10-CM

## 2025-03-21 DIAGNOSIS — M25.562 BILATERAL CHRONIC KNEE PAIN: ICD-10-CM

## 2025-03-21 DIAGNOSIS — G89.29 CHRONIC PAIN OF LEFT ANKLE: ICD-10-CM

## 2025-03-21 DIAGNOSIS — M25.561 BILATERAL CHRONIC KNEE PAIN: ICD-10-CM

## 2025-03-21 PROCEDURE — 97110 THERAPEUTIC EXERCISES: CPT | Mod: GP,CQ

## 2025-03-21 ASSESSMENT — PAIN - FUNCTIONAL ASSESSMENT: PAIN_FUNCTIONAL_ASSESSMENT: 0-10

## 2025-03-21 ASSESSMENT — PAIN SCALES - GENERAL
PAINLEVEL_OUTOF10: 4
PAINLEVEL_OUTOF10: 6

## 2025-03-21 NOTE — PROGRESS NOTES
"Physical Therapy Treatment    Patient Name: Nubia Martinez  MRN: [id number]  Today's Date: 3/21/2025  Time Calculation  Start Time: 0915  Stop Time: 0958  Time Calculation (min): 43 min  PT Therapeutic Procedures Time Entry  Therapeutic Exercise Time Entry: 41       Assessment:   Mikeo's mild challenges with L fwd tandem stance on airex and with increased resistance on side stepping. Able to complete SLS on airex without touching/holding on. Demo's slight fatigue at end of session. AARON Kinney All clinical decision making and treatment directly supervised by Carmen Chapin PTA.    Plan:  Continue improving strength and decreasing pain to ascend/descend stairs with little to no difficulty.     OP PT Plan  Treatment/Interventions: Cryotherapy, Education/ Instruction, Hot pack, Gait training, Manual therapy, Neuromuscular re-education, Therapeutic exercises  PT Plan: Skilled PT  PT Frequency:  (2x/wk for 5 weeks or 11 sessions)  Rehab Potential: Good  Plan of Care Agreement: Patient (Patient Goal:  Improve pain and get around better with less pain.)    Current Problem  Problem List Items Addressed This Visit             ICD-10-CM    Chronic pain of left ankle M25.572, G89.29    Bilateral chronic knee pain M25.561, M25.562, G89.29       Subjective   General  Reason for Referral: B/L knee and L ankle issue  Referred By: Dr. Neri  General Comment: Visit # 9/10  Visit: 9  HEP: Yes  States she is compliant with her HEP. Reports she has been having some anterior hip pain when standing up.     Precautions  Precautions  Precautions Comment: Low Fall Risk.   PMH:  DM, increased cholesterol, hx of basal cell carcinoma (NO active CA), fatty liver    Pain  Pain Assessment: 0-10  0-10 (Numeric) Pain Score: 6  Pain Type: Chronic pain  Pain Location: Knee  Pain Orientation: Left    Objective     Treatments:  Therapeutic exercise: 41 min, 3 units  Bike 5'   Slant board x2 1'   Step ups fwd 6\" 2x10 R/L X  Step ups lateral 2 x10 R/L " "6\" X  Lateral stepping with green loop x4 trips (P, resistance)  Fwd/bkwd with yellow loop x2 X  SLS 3 x 30\" on airex ea LE (P, reps)  Tandem stance on airex R/L 2x30 sec ea (P, reps)  LAQ's with ball between knees 2x10 b/l   HS curls blue band 2 x 10 b/l   SLR 2 x 10   Bridge with ball 2 x 10   Supine SAQ 2 x10 each (P, reps)  Hooklying ball squeezes  2x10 5\" hold (P, reps)    Not today 3/17/25  Seated hamstring stretches x3  30\" hold B   Seated marches light blue Tband 2x10 (P, resistance)  Seated hip abd light blue band 2 x 10   Resistive Ankle DF/Inv/Ev  light blue Band 2 x10 (X)     Not this date;  S/L hip abd, add 2 x 10   DF Strap Stretch  30 sec hold x 3 HEP   Seated ankle ABC's x1 cycle HEP  Ankle PROM all planes    OP EDUCATION:    Access Code: [email or web address]  URL: [email or web address]  Date: 2025  Prepared by: Carmen Bray    Exercises  - Tandem Stance with Chair Support  - 1 x daily - 7 x weekly - 3 sets - 10 reps  - Side Stepping with Resistance at Ankles and Counter Support  - 1 x daily - 7 x weekly - 3 sets - 10 reps    Goals:  Active       PT Problem       PT Goal 1       Start:  25    Expected End:  25       LTG's:    1) Improve  B/L knee and L ankle strength from 4/5  to >= 5-/5 throughout in order to facilitate safe gait and mobility.      4-6 wks      2) Improve  L ankle DF PROM from  6 deg DF to >= 12 deg DF deg in order to facilitate safe gait and mobility.        4-6 wks      3) Improve L ankle and B/L knee pain from   6/10 to <= 0-3/10 with activity in order to improve QOL.         4-6 wks      4) Improve LFES score by >= 5 points in order to improve QOL.      4-6 wks      5) Pt will be able to walk longer distances, negotiate stairs and return to exercise and work around the home or on the job without significant limitation.      4-6 wks        ST) Pt/caregiver will be I and consistent with HEP with use of handout as needed in order to maximize " strength and flexibility.      2-3 wks

## 2025-03-25 ENCOUNTER — APPOINTMENT (OUTPATIENT)
Dept: PHYSICAL THERAPY | Facility: CLINIC | Age: 50
End: 2025-03-25
Payer: COMMERCIAL

## 2025-03-27 ENCOUNTER — APPOINTMENT (OUTPATIENT)
Dept: PHYSICAL THERAPY | Facility: CLINIC | Age: 50
End: 2025-03-27
Payer: COMMERCIAL

## 2025-03-27 ENCOUNTER — TREATMENT (OUTPATIENT)
Dept: PHYSICAL THERAPY | Facility: CLINIC | Age: 50
End: 2025-03-27
Payer: COMMERCIAL

## 2025-03-27 DIAGNOSIS — M25.561 BILATERAL CHRONIC KNEE PAIN: ICD-10-CM

## 2025-03-27 DIAGNOSIS — G89.29 BILATERAL CHRONIC KNEE PAIN: ICD-10-CM

## 2025-03-27 DIAGNOSIS — G89.29 CHRONIC PAIN OF LEFT ANKLE: ICD-10-CM

## 2025-03-27 DIAGNOSIS — M25.562 BILATERAL CHRONIC KNEE PAIN: ICD-10-CM

## 2025-03-27 DIAGNOSIS — M25.572 CHRONIC PAIN OF LEFT ANKLE: ICD-10-CM

## 2025-03-27 PROCEDURE — 97110 THERAPEUTIC EXERCISES: CPT | Mod: GP

## 2025-03-27 ASSESSMENT — PAIN - FUNCTIONAL ASSESSMENT: PAIN_FUNCTIONAL_ASSESSMENT: 0-10

## 2025-03-27 ASSESSMENT — PAIN SCALES - GENERAL: PAINLEVEL_OUTOF10: 2

## 2025-03-27 NOTE — PROGRESS NOTES
Physical Therapy    Physical Therapy Treatment/ Recheck     Patient Name: Nubia Martinez  MRN: [id number]  : 1975   Evelio Neri  Today's Date: 3/27/2025  Time Calculation  Start Time: 0900  Stop Time: 0940  Time Calculation (min): 40 min     PT Therapeutic Procedures Time Entry  Therapeutic Exercise Time Entry: 39                   Current Problem  Problem List Items Addressed This Visit             ICD-10-CM    Chronic pain of left ankle M25.572, G89.29    Bilateral chronic knee pain M25.561, M25.562, G89.29        General  Reason for Referral: B/L knee and L ankle issue  Referred By: Dr. Neri  General Comment: Visit # 10/10  Visit # 10    Subjective   Patient reports compliance with HEP. Patient reports her knee is feeling pretty good. Up and down stairs is still irritating to her knee. Walking is okay most of the time, depends on the distance. Has some stiffness after sitting for a while. Ankle is doing a lot better than when starting here.     Precautions  Precautions  Precautions Comment: Low Fall Risk.   PMH:  DM, increased cholesterol, hx of basal cell carcinoma (NO active CA), fatty liver    Pain  Pain Assessment: 0-10  0-10 (Numeric) Pain Score: 2  Pain Type: Chronic pain  Pain Location: Knee  Pain Orientation: Left    Objective   Observation:  Mild edema B/L knee and L ankle.     Gait:  Mildly antalgic gait with mildly slower rachelle.      Palpation:  Tender over L calcaneus and achilles area.  Tender B/L lateral knee and joint line.     Strength:                         R-   Knee flex   5-/5   Knee ext   5-/5   hip flex/abd  5 /5   DF   5/5   PF   5/5    EV   5/5       INV  5/5       L-   Knee flex   5-/5   Knee ext   5-/5   hip flex/abd   5-/5   DF   5/5   PF   5-/5     EV    5-/5     INV    5/5     Knee PROM:                     R-   Knee flex  135 deg    Knee ext  0 deg        L-   Knee flex  135  deg    Knee ext  0 deg     L-   Ankle   DF  12 deg    INV  50  deg    EV   40  deg                 "  Outcome Measures:  Other Measures  Lower Extremity Funtional Score (LEFS): 47 - initial eval   LEFS- 35 discharge     Treatments:  Therapeutic exercise: 41 min, 3 units  Bike 5'   Slant board x2 1'   Step ups fwd 6\" 2x10 R/L X  Step ups lateral 2 x10 R/L 6\" X  Lateral stepping with green loop x4 trips (P, resistance)  Fwd/bkwd with yellow loop x2 X  SLS 3 x 30\" on airex ea LE (P, reps)  Tandem stance on airex R/L 2x30 sec ea (P, reps)  LAQ's with ball between knees 2x10 b/l   HS curls blue band 2 x 10 b/l   SLR 2 x 10   Bridge with ball 2 x 10   Supine SAQ 2 x10 each (P, reps)  Hooklying ball squeezes  2x10 5\" hold (P, reps)     Not today 3/17/25  Seated hamstring stretches x3  30\" hold B   Seated marches light blue Tband 2x10 (P, resistance)  Seated hip abd light blue band 2 x 10   Resistive Ankle DF/Inv/Ev  light blue Band 2 x10 (X)     Not this date;  S/L hip abd, add 2 x 10   DF Strap Stretch  30 sec hold x 3 HEP   Seated ankle ABC's x1 cycle HEP  Ankle PROM all planes  OP Education  Access Code: [email or web address]  URL: [email or web address]  Date: 03/27/2025  Prepared by: Carmen Bray    Exercises  - Step Up  - 1 x daily - 7 x weekly - 3 sets - 10 reps  - Lateral Step Up  - 1 x daily - 7 x weekly - 3 sets - 10 reps  - Side Stepping with Resistance at Ankles and Counter Support  - 1 x daily - 7 x weekly - 3 sets - 10 reps  - Single Leg Stance with Support  - 1 x daily - 7 x weekly - 3 sets - 10 reps  - Seated Hamstring Stretch  - 1 x daily - 7 x weekly - 3 sets - 30 sec  hold  - Straight Leg Raise  - 1 x daily - 7 x weekly - 3 sets - 10 reps  - Standing Heel Raise with Support  - 1 x daily - 7 x weekly - 3 sets - 10 reps  - Seated Hip Abduction with Resistance  - 1 x daily - 7 x weekly - 3 sets - 10 reps  - Seated Hip Adduction Isometrics with Ball  - 1 x daily - 7 x weekly - 3 sets - 10 reps       Access Code: [email or web address]  URL: [email or web address]  Date: " 2025  Prepared by: Carmen Bray     Exercises  - Tandem Stance with Chair Support  - 1 x daily - 7 x weekly - 3 sets - 10 reps  - Side Stepping with Resistance at Ankles and Counter Support  - 1 x daily - 7 x weekly - 3 sets - 10 reps    Assessment  Patient verified by name and . Patient has attended 9 therapy sessions since initial evaluation on 25. Patient demonstrates improvements in overall strength of L knee and ankle as well as ankle ROM. Patient demonstrates good compliance to [facility] currently. Patient continues to have some discomfort when walking further than a block or two, as well as some knee pain with ascending/descending an entire flight of stairs. Educated patient to continue with exercises to build up overall endurance of L LE in order to continue to see functional improvements. Plan to discharge to [facility] at this time.     Plan- discharge to [facility]   Treatment/Interventions: Cryotherapy, Education/ Instruction, Hot pack, Gait training, Manual therapy, Neuromuscular re-education, Therapeutic exercises  PT Plan: No Additional PT interventions required at this time  Plan of Care Agreement: Patient (Patient Goal:  Improve pain and get around better with less pain.)    Resolved       PT Problem       PT Goal 1 (Adequate for Discharge)       Start:  25    Expected End:  25    Resolved:  25    LTG's:    1) Improve  B/L knee and L ankle strength from 4/5  to >= 5-/5 throughout in order to facilitate safe gait and mobility.      4-6 wks -- GOAL MET       2) Improve  L ankle DF PROM from  6 deg DF to >= 12 deg DF deg in order to facilitate safe gait and mobility.        4-6 wks -- GOAL MET       3) Improve L ankle and B/L knee pain from   6/10 to <= 0-3/10 with activity in order to improve QOL.         4-6 wks - Partially MET       4) Improve LFES score by >= 5 points in order to improve QOL.      4-6 wks -- GOAL MET       5) Pt will be able to walk longer distances, negotiate stairs  and return to exercise and work around the home or on the job without significant limitation.     4-6 wks -- GOAL NOT MET - still having a hard time with stairs and walking distance is about the same         ST) Pt/caregiver will be I and consistent with HEP with use of handout as needed in order to maximize strength and flexibility.      2-3 wks -- GOAL MET

## 2025-04-01 ENCOUNTER — APPOINTMENT (OUTPATIENT)
Age: 50
End: 2025-04-01
Payer: COMMERCIAL

## 2025-04-01 VITALS
BODY MASS INDEX: 30.99 KG/M2 | WEIGHT: 186 LBS | SYSTOLIC BLOOD PRESSURE: 132 MMHG | HEART RATE: 76 BPM | HEIGHT: 65 IN | DIASTOLIC BLOOD PRESSURE: 78 MMHG

## 2025-04-01 DIAGNOSIS — J30.2 SEASONAL ALLERGIES: ICD-10-CM

## 2025-04-01 DIAGNOSIS — N20.0 HORSESHOE KIDNEY WITH RENAL CALCULUS: ICD-10-CM

## 2025-04-01 DIAGNOSIS — E78.49 OTHER HYPERLIPIDEMIA: ICD-10-CM

## 2025-04-01 DIAGNOSIS — K76.0 FATTY LIVER: ICD-10-CM

## 2025-04-01 DIAGNOSIS — Z87.19 HISTORY OF ESOPHAGEAL STRICTURE: ICD-10-CM

## 2025-04-01 DIAGNOSIS — E11.65 TYPE 2 DIABETES MELLITUS WITH HYPERGLYCEMIA, WITHOUT LONG-TERM CURRENT USE OF INSULIN: Primary | ICD-10-CM

## 2025-04-01 DIAGNOSIS — E11.65 TYPE 2 DIABETES MELLITUS WITH HYPERGLYCEMIA, WITHOUT LONG-TERM CURRENT USE OF INSULIN: ICD-10-CM

## 2025-04-01 DIAGNOSIS — Z00.00 HEALTHCARE MAINTENANCE: ICD-10-CM

## 2025-04-01 DIAGNOSIS — Q63.1 HORSESHOE KIDNEY WITH RENAL CALCULUS: ICD-10-CM

## 2025-04-01 DIAGNOSIS — I10 HYPERTENSION, UNSPECIFIED TYPE: ICD-10-CM

## 2025-04-01 PROBLEM — G89.29 CHRONIC PAIN OF BOTH KNEES: Status: RESOLVED | Noted: 2025-02-12 | Resolved: 2025-04-01

## 2025-04-01 PROBLEM — M25.562 BILATERAL CHRONIC KNEE PAIN: Status: RESOLVED | Noted: 2025-02-18 | Resolved: 2025-04-01

## 2025-04-01 PROBLEM — G89.29 CHRONIC PAIN OF LEFT ANKLE: Status: RESOLVED | Noted: 2025-02-12 | Resolved: 2025-04-01

## 2025-04-01 PROBLEM — Z86.59 PERSONAL HISTORY OF AFFECTIVE DISORDER: Status: RESOLVED | Noted: 2023-10-06 | Resolved: 2025-04-01

## 2025-04-01 PROBLEM — M25.561 BILATERAL CHRONIC KNEE PAIN: Status: RESOLVED | Noted: 2025-02-18 | Resolved: 2025-04-01

## 2025-04-01 PROBLEM — M25.562 CHRONIC PAIN OF BOTH KNEES: Status: RESOLVED | Noted: 2025-02-12 | Resolved: 2025-04-01

## 2025-04-01 PROBLEM — M25.572 CHRONIC PAIN OF LEFT ANKLE: Status: RESOLVED | Noted: 2025-02-12 | Resolved: 2025-04-01

## 2025-04-01 PROBLEM — M25.561 CHRONIC PAIN OF BOTH KNEES: Status: RESOLVED | Noted: 2025-02-12 | Resolved: 2025-04-01

## 2025-04-01 PROBLEM — G89.29 BILATERAL CHRONIC KNEE PAIN: Status: RESOLVED | Noted: 2025-02-18 | Resolved: 2025-04-01

## 2025-04-01 PROCEDURE — 3075F SYST BP GE 130 - 139MM HG: CPT | Performed by: FAMILY MEDICINE

## 2025-04-01 PROCEDURE — 3008F BODY MASS INDEX DOCD: CPT | Performed by: FAMILY MEDICINE

## 2025-04-01 PROCEDURE — 99214 OFFICE O/P EST MOD 30 MIN: CPT | Performed by: FAMILY MEDICINE

## 2025-04-01 PROCEDURE — 3078F DIAST BP <80 MM HG: CPT | Performed by: FAMILY MEDICINE

## 2025-04-01 PROCEDURE — 4010F ACE/ARB THERAPY RXD/TAKEN: CPT | Performed by: FAMILY MEDICINE

## 2025-04-01 PROCEDURE — 1036F TOBACCO NON-USER: CPT | Performed by: FAMILY MEDICINE

## 2025-04-01 PROCEDURE — 3051F HG A1C>EQUAL 7.0%<8.0%: CPT | Performed by: FAMILY MEDICINE

## 2025-04-01 RX ORDER — ATORVASTATIN CALCIUM 40 MG/1
40 TABLET, FILM COATED ORAL DAILY
Qty: 90 TABLET | Refills: 1 | Status: SHIPPED | OUTPATIENT
Start: 2025-04-01

## 2025-04-01 RX ORDER — CETIRIZINE HYDROCHLORIDE 10 MG/1
10 TABLET ORAL DAILY PRN
Qty: 90 TABLET | Refills: 1 | Status: SHIPPED | OUTPATIENT
Start: 2025-04-01

## 2025-04-01 RX ORDER — LOSARTAN POTASSIUM 25 MG/1
25 TABLET ORAL DAILY
Qty: 90 TABLET | Refills: 1 | Status: SHIPPED | OUTPATIENT
Start: 2025-04-01

## 2025-04-01 NOTE — PROGRESS NOTES
Patient presents for periodic surveillance of chronic medical problems.     Subjective  Nubia Martinez is a 50 y.o. female who presents for Follow-up.  HPI  Left lower quadrant pain, intermittent, more tender if pushed on or dog walks on stomach.  Moving doesn't change it, had CT of abdomen and pelvis, no change in bowel movements.  No dysuria.  Has noticed a smell of sweetness in her urine.  Discussed we could get her to pain management, she states that's not necessary at this point, will monitor  DM, A1c 7.4, recommend increase steglatro from 5 to 15, reviewed importance of hydration, and hold amaryl for now.  Continue metformin.    Fatty liver, recommend most foods be unprocessed, ie from the garden,barn or pond  Agrees to nutrition consult  Anemia, continues to improve, will monitor  Seeing ortho for knee and ankle pain, PT did help  Review of Systems   All other systems reviewed and are negative.  .    No Known Allergies    Current Outpatient Medications on File Prior to Visit   Medication Sig Dispense Refill    calcium carbonate (Tums) 200 mg calcium chewable tablet Chew 2 tablets (1,000 mg) once daily.      diclofenac sodium (Voltaren) 1 % gel Apply 4.5 inches (4 g) topically 4 times a day as needed (as needed for pain). 100 g 1    estradiol (Estrace) 0.01 % (0.1 mg/gram) vaginal cream Insert 0.5 Applicatorfuls (2 g) into the vagina once daily at bedtime. At bedtime for 2 weeks, then at bedtime twice a week. 34 g 3    fluticasone (Flonase) 50 mcg/actuation nasal spray Administer 2 sprays into each nostril once daily. Shake gently. Before first use, prime pump. After use, clean tip and replace cap.      fluticasone (Flovent) 110 mcg/actuation inhaler Inhale 1 puff 2 times a day. Rinse mouth with water after use to reduce aftertaste and incidence of candidiasis. Do not swallow. 12 g 1    FreeStyle Marciano 14 Day Sensor kit USE AS DIRECTED EVERY 14 days 1 each 5    metFORMIN  mg 24 hr tablet Take 2 tablets  (1,000 mg) by mouth 2 times daily (morning and late afternoon). Do not crush, chew, or split. 360 tablet 1    montelukast (Singulair) 10 mg tablet TAKE ONE TABLET BY MOUTH ONCE DAILY 90 tablet 3    multivitamin-children's (Cerovite, Jr) chewable tablet Chew 1 tablet once daily.      omeprazole (PriLOSEC) 40 mg DR capsule Take 1 capsule (40 mg) by mouth once daily. 90 capsule 1    Steglatro TAKE 1 TABLET (5mg) BY MOUTH ONCE DAILY 30 tablet 3    [DISCONTINUED] atorvastatin (Lipitor) 40 mg tablet Take 1 tablet (40 mg) by mouth once daily. 90 tablet 1    [DISCONTINUED] cetirizine (ZyrTEC) 10 mg tablet Take 1 tablet (10 mg) by mouth once daily as needed for allergies. 30 tablet 11    [DISCONTINUED] glimepiride (Amaryl) 1 mg tablet TAKE ONE TABLET BY MOUTH ONCE DAILY 90 tablet 1    [DISCONTINUED] losartan (Cozaar) 25 mg tablet Take 1 tablet (25 mg) by mouth once daily. as directed 90 tablet 1     No current facility-administered medications on file prior to visit.       Patient Active Problem List   Diagnosis    Hiatal hernia with GERD    History of depression    Hyperlipidemia    Seasonal allergies    Type 2 diabetes mellitus with hyperglycemia, without long-term current use of insulin    History of esophageal stricture    Horseshoe kidney with renal calculus       Objective     Visit Vitals  /78 (BP Location: Left arm, Patient Position: Sitting)   Pulse 76      Physical Exam  Vitals and nursing note reviewed.   Constitutional:       General: She is not in acute distress.     Appearance: Normal appearance. She is not toxic-appearing.   HENT:      Head: Normocephalic and atraumatic.   Cardiovascular:      Rate and Rhythm: Normal rate and regular rhythm.      Heart sounds: No murmur heard.  Pulmonary:      Effort: Pulmonary effort is normal.      Breath sounds: Normal breath sounds.   Musculoskeletal:      Cervical back: Neck supple. No rigidity.      Comments:     Skin:     General: Skin is warm and dry.    Neurological:      General: No focal deficit present.      Mental Status: She is alert and oriented to person, place, and time.   Psychiatric:         Mood and Affect: Mood normal.         Behavior: Behavior normal.       Lab Requisition on 03/18/2025   Component Date Value Ref Range Status    Color, Urine 03/18/2025 Light-Yellow  Light-Yellow, Yellow, Dark-Yellow Final    Appearance, Urine 03/18/2025 Turbid (N)  Clear Final    Specific Gravity, Urine 03/18/2025 1.022  1.005 - 1.035 Final    pH, Urine 03/18/2025 5.0  5.0, 5.5, 6.0, 6.5, 7.0, 7.5, 8.0 Final    Protein, Urine 03/18/2025 NEGATIVE  NEGATIVE, 10 (TRACE), 20 (TRACE) mg/dL Final    Glucose, Urine 03/18/2025 OVER (4+) (A)  Normal mg/dL Final    Blood, Urine 03/18/2025 NEGATIVE  NEGATIVE mg/dL Final    Ketones, Urine 03/18/2025 NEGATIVE  NEGATIVE mg/dL Final    Bilirubin, Urine 03/18/2025 NEGATIVE  NEGATIVE mg/dL Final    Urobilinogen, Urine 03/18/2025 Normal  Normal mg/dL Final    Nitrite, Urine 03/18/2025 NEGATIVE  NEGATIVE Final    Leukocyte Esterase, Urine 03/18/2025 500 Juliane/uL (A)  NEGATIVE Final    WBC, Urine 03/18/2025 >50 (A)  1-5, NONE /HPF Final    WBC Clumps, Urine 03/18/2025 OCCASIONAL  Reference range not established. /HPF Final    RBC, Urine 03/18/2025 11-20 (A)  NONE, 1-2, 3-5 /HPF Final    Squamous Epithelial Cells, Urine 03/18/2025 10-25 (FEW)  Reference range not established. /HPF Final    Bacteria, Urine 03/18/2025 1+ (A)  NONE SEEN /HPF Final    Budding Yeast, Urine 03/18/2025 PRESENT (A)  NONE /HPF Final    Mucus, Urine 03/18/2025 1+  Reference range not established. /LPF Final   Lab on 03/18/2025   Component Date Value Ref Range Status    WBC 03/18/2025 8.0  4.4 - 11.3 x10*3/uL Final    nRBC 03/18/2025 0.0  0.0 - 0.0 /100 WBCs Final    RBC 03/18/2025 5.65 (H)  4.00 - 5.20 x10*6/uL Final    Hemoglobin 03/18/2025 13.3  12.0 - 16.0 g/dL Final    Hematocrit 03/18/2025 45.3  36.0 - 46.0 % Final    MCV 03/18/2025 80  80 - 100 fL Final     MCH 03/18/2025 23.5 (L)  26.0 - 34.0 pg Final    MCHC 03/18/2025 29.4 (L)  32.0 - 36.0 g/dL Final    RDW 03/18/2025 15.3 (H)  11.5 - 14.5 % Final    Platelets 03/18/2025 251  150 - 450 x10*3/uL Final    Neutrophils % 03/18/2025 60.0  40.0 - 80.0 % Final    Immature Granulocytes %, Automated 03/18/2025 0.1  0.0 - 0.9 % Final    Immature Granulocyte Count (IG) includes promyelocytes, myelocytes and metamyelocytes but does not include bands. Percent differential counts (%) should be interpreted in the context of the absolute cell counts (cells/UL).    Lymphocytes % 03/18/2025 29.3  13.0 - 44.0 % Final    Monocytes % 03/18/2025 4.9  2.0 - 10.0 % Final    Eosinophils % 03/18/2025 5.1  0.0 - 6.0 % Final    Basophils % 03/18/2025 0.6  0.0 - 2.0 % Final    Neutrophils Absolute 03/18/2025 4.79  1.20 - 7.70 x10*3/uL Final    Percent differential counts (%) should be interpreted in the context of the absolute cell counts (cells/uL).    Immature Granulocytes Absolute, Au* 03/18/2025 0.01  0.00 - 0.70 x10*3/uL Final    Lymphocytes Absolute 03/18/2025 2.34  1.20 - 4.80 x10*3/uL Final    Monocytes Absolute 03/18/2025 0.39  0.10 - 1.00 x10*3/uL Final    Eosinophils Absolute 03/18/2025 0.41  0.00 - 0.70 x10*3/uL Final    Basophils Absolute 03/18/2025 0.05  0.00 - 0.10 x10*3/uL Final    Hemoglobin A1C 03/18/2025 7.4 (H)  See comment % Final    Estimated Average Glucose 03/18/2025 166  Not Established mg/dL Final    Iron 03/18/2025 47  35 - 150 ug/dL Final    UIBC 03/18/2025 397 (H)  110 - 370 ug/dL Final    TIBC 03/18/2025 444  240 - 445 ug/dL Final    % Saturation 03/18/2025 11 (L)  25 - 45 % Final    Hepatitis B Surface AG 03/18/2025 Nonreactive  Nonreactive Final    Biotin interference may cause falsely decreased results. Patients taking a Biotin dose of up to 5 mg/day should refrain from taking Biotin for 24 hours before sample collection. Providers may contact their local laboratory for  further information.    Hepatitis A  AB-  IgM 03/18/2025 Nonreactive  Nonreactive Final    Biotin interference may cause falsely decreased results. Patients taking a Biotin dose of up to 5 mg/day should refrain from taking Biotin for 24 hours before sample collection. Providers may contact their local laboratory  for further information.        Hepatitis B Core AB; IgM 03/18/2025 Nonreactive  Nonreactive Final    Results from patients taking biotin supplements or receiving high-dose biotin therapy should be interpreted with caution due to possible interference with this test. Providers may contact their local laboratory for further information.        Hepatitis C AB 03/18/2025 Nonreactive  Nonreactive Final    Results from patients taking biotin supplements or receiving high-dose biotin therapy should be interpreted with caution due to possible interference with this test. Providers may contact their local laboratory for further information.    Glucose 03/18/2025 132 (H)  74 - 99 mg/dL Final    Sodium 03/18/2025 141  136 - 145 mmol/L Final    Potassium 03/18/2025 4.1  3.5 - 5.3 mmol/L Final    Chloride 03/18/2025 103  98 - 107 mmol/L Final    Bicarbonate 03/18/2025 26  21 - 32 mmol/L Final    Anion Gap 03/18/2025 16  10 - 20 mmol/L Final    Urea Nitrogen 03/18/2025 20  6 - 23 mg/dL Final    Creatinine 03/18/2025 0.72  0.50 - 1.05 mg/dL Final    eGFR 03/18/2025 >90  >60 mL/min/1.73m*2 Final    Calculations of estimated GFR are performed using the 2021 CKD-EPI Study Refit equation without the race variable for the IDMS-Traceable creatinine methods.  https://jasn.asnjournals.org/content/early/2021/09/22/ASN.3611445511    Calcium 03/18/2025 9.6  8.6 - 10.3 mg/dL Final    Albumin 03/18/2025 4.6  3.4 - 5.0 g/dL Final    Alkaline Phosphatase 03/18/2025 105  33 - 110 U/L Final    Total Protein 03/18/2025 7.0  6.4 - 8.2 g/dL Final    AST 03/18/2025 25  9 - 39 U/L Final    Bilirubin, Total 03/18/2025 0.5  0.0 - 1.2 mg/dL Final    ALT 03/18/2025 52 (H)  7 - 45 U/L  Final    Patients treated with Sulfasalazine may generate falsely decreased results for ALT.    Anti-Mitochondrial Antibody 03/18/2025 Negative  Negative Final       Assessment/Plan   Problem List Items Addressed This Visit       Hyperlipidemia    Type 2 diabetes mellitus with hyperglycemia, without long-term current use of insulin - Primary    Relevant Medications    ertugliflozin (Steglatro) 15 mg tablet    Other Relevant Orders    Referral to Nutrition Services    CBC and Auto Differential    Comprehensive Metabolic Panel    Hemoglobin A1C    Albumin-Creatinine Ratio, Urine Random    History of esophageal stricture    Horseshoe kidney with renal calculus     Other Visit Diagnoses       Fatty liver                 As per hpi, followup 3 mos , labs prior.  Call concerns.       Lisandra Denney MD

## 2025-04-10 ENCOUNTER — APPOINTMENT (OUTPATIENT)
Dept: ORTHOPEDIC SURGERY | Facility: CLINIC | Age: 50
End: 2025-04-10
Payer: COMMERCIAL

## 2025-04-10 ENCOUNTER — HOSPITAL ENCOUNTER (OUTPATIENT)
Dept: RADIOLOGY | Facility: CLINIC | Age: 50
Discharge: HOME | End: 2025-04-10
Payer: COMMERCIAL

## 2025-04-10 ENCOUNTER — HOSPITAL ENCOUNTER (OUTPATIENT)
Dept: RADIOLOGY | Facility: EXTERNAL LOCATION | Age: 50
Discharge: HOME | End: 2025-04-10

## 2025-04-10 DIAGNOSIS — G89.29 CHRONIC LEFT SHOULDER PAIN: ICD-10-CM

## 2025-04-10 DIAGNOSIS — G89.29 CHRONIC PAIN OF BOTH KNEES: ICD-10-CM

## 2025-04-10 DIAGNOSIS — M25.561 CHRONIC PAIN OF BOTH KNEES: ICD-10-CM

## 2025-04-10 DIAGNOSIS — M25.512 CHRONIC LEFT SHOULDER PAIN: ICD-10-CM

## 2025-04-10 DIAGNOSIS — M25.562 CHRONIC PAIN OF BOTH KNEES: ICD-10-CM

## 2025-04-10 PROCEDURE — 73030 X-RAY EXAM OF SHOULDER: CPT | Mod: LT

## 2025-04-10 PROCEDURE — 73030 X-RAY EXAM OF SHOULDER: CPT | Mod: LEFT SIDE | Performed by: RADIOLOGY

## 2025-04-10 NOTE — PROGRESS NOTES
Assessment/Plan   Encounter Diagnoses:  Chronic left shoulder pain    Chronic pain of both knees  Chronic left shoulder pain  Assessment: Left shoulder with a small area of calcific tendinitis  Left knee osteoarthritis  Right knee pain with some degenerative changes    Plan:  Physical therapy for left shoulder impingement and calcific tendinitis.  Left knee brace.  Ultrasound of both knees and the left shoulder were obtained and reviewed with the patient under real-time.  Follow-up in 8 weeks for reevaluation.       Subjective    Patient ID: Nubia Martinez is a 50 y.o. female.    Chief Complaint: New Patient Visit of the Right Knee (12-19-24 X-RAYS /FINISHED PHYSICAL THERAPY//Pain Rate 5/Uses tylenol and motrin/Has a patellar knee stablizer), New Patient Visit of the Left Knee (Torn ligaments for the left /X-rays 12-19-24/Uses tylenol and motrin for pain/No recent injections/Pain Rate 7/No recent surgeries), New Patient Visit of the Left Ankle (Hit on the hard part of a recliner /X-rays 2-12-25/Doi 12-01-24/Pain rate 6), and Pain of the Left Shoulder (X-RAYS 4-10-25/CHRONIC PAIN /HAS NOT HAD AN INJECTION IN THIS SHOULDER/PAIN RATE 4/PAIN RATE TRYING TO USE IS AN 8)     Last Surgery: No surgery found  Last Surgery Date: No surgery found    HPI  50-year-old who comes in today stating that her knees are improved somewhat the left is worse than the right.  She has tried over-the-counter bracing for the knee and this has been helpful but it does not fit very well she was inquiring about bracing.  She gets a sense of instability in the knee with her activity.  The left shoulder has been bothering her off and on for about a year.  She had a more intense time when the shoulder was painful for several weeks and then it became better but did not completely resolve.    OBJECTIVE: ORTHO EXAM  Left shoulder:  Inspection:  Skin healthy to gross inspection  No ecchymosis, no edema, no gross atrophy    Palpation:  Acromioclavicular  joint minimal tenderness   Biceps tendon/ groove minimal tenderness  Anterior Acromial Bursal Area moderate tenderness  Cervical spine no tenderness  She was tender over the supraspinatus tendon at the anterior acromial area.     ROM:  Forward Flexion 100 and some guarding to 140 degrees active  External Rotation 60 degrees at 90 degrees abduction  Internal Rotation 50 degrees at 90 degrees abduction    Strength:  4 -/5 Supraspinatus isolation- resisted elevation  4/5 Infraspinatus isolation- ER  5 -/5 Subscapularis- IR     Negative lift off test   Negative Spurling´s test  Positive Neer and Hawking´s test  Negative Speed's test  Negative Inferior Sulcus  Negative Anterior Apprehension    Full unrestricted motion at Elbow/Wrist/Hand  Neurovascular exam normal distally      Left knee  Tender along the medial joint line and posterior medial corner.  Valgus laxity to 20 degrees.  Anterior posterior drawer and Lachman's are negative.  0 to 120 degrees range of motion.  Richard was mildly positive.  Calves are supple and nontender bilaterally.  Her thigh was supple and nontender.    Right knee:  Tender along the medial joint line and posterior medial corner.  Valgus laxity to 15 degrees.  Anterior posterior drawer and Lachman's are negative.  0 to 120 degrees range of motion.  Richard was minimally positive.  Calves are supple and nontender bilaterally.  Her thigh was supple and nontender.      IMAGE RESULTS:  Point of Care Ultrasound  These images are not reportable by radiology and will not be interpreted   by  Radiologists.      ULTRASOUND  DIAGNOSTIC ULTRASOUND FINAL REPORT: Left SHOULDER  Provider: Evelio Neri MD  Date of Exam: Today  Procedure: Ultrasound, extremity, nonvascular, real-time, COMPLETE, anatomic specific.  Site:   SHOULDER  Indication:  SHOULDER PAIN  Technique: B-Mode Ultrasound Examination performed using 8-13 MHz linear transducer with [employer] Software  STUDY TYPE:   1. ULTRASOUND EXTREMITY  INCLUDING BUT NOT LIMITED TO SHOULDER MUSCLE, TENDONS, LIGAMENTS, FATTY TISSUES, SUBCUTANEOUS TISSUES AND OTHER SOFT TISSUE STRUCTURES SUCH AS ABSCESSES OR FREE FLUID ACCUMULATION WITHIN THE PRIMARY JOINT AS WELL AS ADJACENT JOINTS.  2. REAL TIME WITH IMAGE DOCUMENTATION  3. NON-VASCULAR  4. COMPLETE STUDY WHICH INCLUDES A THOROUGH EVALUATION OF THE SHOULDER MUSCLE, TENDONS, LIGAMENTS, FATTY TISSUES, SUBCUTANEOUS TISSUES AND OTHER SOFT TISSUE STRUCTURES SUCH AS ABSCESSES OR FREE FLUID ACCUMULATION WITHIN THE PRIMARY JOINT AS WELL AS ADJACENT JOINTS.  Live ultrasound was performed of the patient´s  SHOULDER and PERMANENTLY documented.  This is a thorough and complete evaluation of a specific anatomic region specifically the  SHOULDER.  PERMANENT Image documentation was performed.  This is the complete and final ultrasound report of the patient's  SHOULDER.  The patient was positioned in order to optimize the ultrasound evaluation of the  SHOULDER.  Ultrasound gel was used as a conductive medium in order to both transmit and receive ultrasonic signals that characterize the soft tissues. . I personally performed the ultrasound and reviewed the findings. These show:  Findings:  SHOULDER  Darline-articular evaluation: Live ultrasound was performed of the patient's  SHOULDER that shows tendinosis appearance of the supraspinatus and infraspinatus tendons with the deltoid muscle fibers showing normal striations.  There was a small 3 to 4 mm area of calcific tendinitis.  This was seen on both the transverse and longitudinal views.  There was mild sub acromial effusion.  Evaluation of the subscapularis with the arm in external rotation showed an intact and normal appearing subscapularis tendon.    Joint Evaluation: The biceps tendon was visualized within the bicipital groove.        DIAGNOSTIC ULTRASOUND REPORT FINAL: Left KNEE  Sonographer: Evelio Neri MD  Indication: Knee Pain  Procedure: Ultrasound, extremity, nonvascular,  real-time, COMPLETE, anatomic specific  Technique: B-Mode Ultrasound Examination performed using 6- 9 MHz linear transducer with MSK Software  STUDY TYPE:   1. ULTRASOUND EXTREMITY  2. REAL TIME WITH IMAGE DOCUMENTATION  3. NON-VASCULAR  4. COMPLETE STUDY, INCLUDING BUT NOT LIMITED TO MUSCLE, TENDONS, LIGAMENTS, SOFT TISSUES, ADIPOSE TISSUE AND SUBCUTANEOUS TISSUE.  Site: KNEE   Live ultrasound was performed with of patient's  KNEE and PERMANENTLY documented. I personally performed the ultrasound and reviewed the findings. These show:    Darline-articular evaluation:   An intact Quadriceps Tendon with the Quadriceps Muscle fibers showing normal striations Quadriceps Tendon demonstrating normal fibrillar pattern. . The Patellar Tendon demonstrates normal fibrillar pattern and is intact.   No significant soft tissue fluid collection/abscess appreciated.     Joint Evaluation:  The lateral joint line shows an intact LCL.   Medial joint line exam shows an intact MCL.   The patellar tendon was within normal limits.  Minimal joint effusion noted.      DIAGNOSTIC ULTRASOUND REPORT FINAL: Right KNEE  Sonographer: Evelio Neri MD  Indication: Knee Pain  Procedure: Ultrasound, extremity, nonvascular, real-time, COMPLETE, anatomic specific  Technique: B-Mode Ultrasound Examination performed using 6- 9 MHz linear transducer with [employer] Software  STUDY TYPE:   1. ULTRASOUND EXTREMITY  2. REAL TIME WITH IMAGE DOCUMENTATION  3. NON-VASCULAR  4. COMPLETE STUDY, INCLUDING BUT NOT LIMITED TO MUSCLE, TENDONS, LIGAMENTS, SOFT TISSUES, ADIPOSE TISSUE AND SUBCUTANEOUS TISSUE.  Site: KNEE   Live ultrasound was performed with of patient's  KNEE and PERMANENTLY documented. I personally performed the ultrasound and reviewed the findings. These show:    Darline-articular evaluation:   An intact Quadriceps Tendon with the Quadriceps Muscle fibers showing normal striations Quadriceps Tendon demonstrating normal fibrillar pattern. . The Patellar Tendon  demonstrates normal fibrillar pattern and is intact.   No significant soft tissue fluid collection/abscess appreciated.     Joint Evaluation:  The lateral joint line shows an intact LCL.   Medial joint line exam shows an intact MCL.   The patellar tendon was within normal limits.  Scant joint effusion noted.     The patient tolerated the procedure well.         The patient tolerated the procedure well.        Procedures     Orders Placed This Encounter    Point of Care Ultrasound    Point of Care Ultrasound    Referral to Physical Therapy

## 2025-04-10 NOTE — ASSESSMENT & PLAN NOTE
Assessment: Left shoulder with a small area of calcific tendinitis  Left knee osteoarthritis  Right knee pain with some degenerative changes    Plan:  Physical therapy for left shoulder impingement and calcific tendinitis.  Left knee brace.  Ultrasound of both knees and the left shoulder were obtained and reviewed with the patient under real-time.  Follow-up in 8 weeks for reevaluation.

## 2025-04-18 ENCOUNTER — NUTRITION (OUTPATIENT)
Dept: NUTRITION | Facility: HOSPITAL | Age: 50
End: 2025-04-18
Payer: COMMERCIAL

## 2025-04-18 NOTE — PROGRESS NOTES
NUTRITION COMMUNICATION NOTE    Nubia Martinez     REASON FOR COMMUNICATION: RD reached out to pt via phone to inform them of appointment cancellation with this provider.  Pt was instructed to reach out to their PCP/ referring provider or to call the following number to reschedule with another provider within .     Phone number to reschedule: [phone]

## 2025-04-28 ENCOUNTER — EVALUATION (OUTPATIENT)
Dept: PHYSICAL THERAPY | Facility: CLINIC | Age: 50
End: 2025-04-28
Payer: COMMERCIAL

## 2025-04-28 DIAGNOSIS — M25.512 CHRONIC LEFT SHOULDER PAIN: ICD-10-CM

## 2025-04-28 DIAGNOSIS — G89.29 CHRONIC LEFT SHOULDER PAIN: ICD-10-CM

## 2025-04-28 PROCEDURE — 97110 THERAPEUTIC EXERCISES: CPT | Mod: GP | Performed by: PHYSICAL THERAPIST

## 2025-04-28 PROCEDURE — 97161 PT EVAL LOW COMPLEX 20 MIN: CPT | Mod: GP | Performed by: PHYSICAL THERAPIST

## 2025-04-28 NOTE — PROGRESS NOTES
Physical Therapy    Physical Therapy Evaluation and Treatment      Patient Name: Nubia Martinez  MRN: [id number]  Today's Date: 4/28/2025    Time Entry:   Time Calculation  Start Time: 0920  Stop Time: 0945  Time Calculation (min): 25 min  PT Evaluation Time Entry  PT Evaluation (Low) Time Entry: 15  PT Therapeutic Procedures Time Entry  Therapeutic Exercise Time Entry: 9    Assessment:  PT Assessment  PT Assessment Results: Decreased strength, Decreased range of motion, Decreased endurance  Rehab Prognosis: Good  Evaluation/Treatment Tolerance: Patient tolerated treatment well  Assessment Comment: Will benefit from skilled PT.     Signs and symptoms consistent with left shoulder pain with strength and ROM limitations. Recent US and XR consistent with left shoulder rotator cuff calcific tendinitis. Will benefit from skilled PT services.    Plan:  OP PT Plan  Treatment/Interventions: Cryotherapy, Hot pack, Manual therapy, Neuromuscular re-education, Therapeutic activities, Therapeutic exercises, Dry needling  PT Plan: Skilled PT  PT Frequency: 2 times per week  Duration: 3 weeks or as needed  Onset Date: 04/28/25  Rehab Potential: Good  Plan of Care Agreement: Patient    Current Problem:   1. Chronic left shoulder pain  Referral to Physical Therapy    Follow Up In Physical Therapy        Subjective    Patient is a 50-year-old female with a PMH of type II diabetes who presents with chronic left shoulder pain for one year with insidious onset. Denies any trauma. She had difficulty with side sleeping. She tried massage, which made her symptoms worse. She tried some exercises herself but she stopped due to pain. She saw Dr. Neri who did US, which showed a 3-4 mm area of calcific tendinitis. She was then referred to PT. Her pain has gotten better overall. Her pain is currently a 3/10 at rest and 5/10 with overhead reaching activities. Her pain is at her left lateral shoulder with occasional pain to her left elbow. No  numbness or tingling. Denies any left shoulder surgeries. XR of left shoulder on 4/10/25 showed rotator cuff calcific tendinitis with mild OA.    Objective   Fair sitting posture overall    L shoulder AROM  160 degrees flexion  160 abduction  C7 Functional ER  L2 Functional IR     L elbow AROM is WFL    L shoulder strength  4+/5 flexion  4+/5 abduction  4+/5 IR  4+/5 ER    L elbow strength  4+/5 extension  4+/5 flexion     Negative Empty Can B  Negative De Oliveira-Froilan B    Tender to palpation at L greater tuberosity     Outcome Measures:  Quick DASH 38.6    Treatments:  Resisted rows x 10 HEP  Resisted IR x 10 HEP  Resisted ER x 10 HEP  Wall walks x 10 HEP  UBE next time  Pulleys next time  B shoulder extension next time    EDUCATION:  Outpatient Education  Individual(s) Educated: Patient  Education Provided: Anatomy, Home Exercise Program, POC  Risk and Benefits Discussed with Patient/Caregiver/Other: yes  Patient/Caregiver Demonstrated Understanding: yes  Plan of Care Discussed and Agreed Upon: yes  Patient Response to Education: Patient/Caregiver Verbalized Understanding of Information  Education Comment: HEP, POC, anatomy    Goals:  Active       Goals       HEP       Start:  04/28/25    Expected End:  12/31/25       Patient will be independent with HEP to improve self management of symptoms.          ROM       Start:  04/28/25    Expected End:  12/31/25       Patient will improve L shoulder AROM to WFL to improve mobility.          Strength       Start:  04/28/25    Expected End:  12/31/25       Patient will improve left shoulder strength to 5/5 to increase functional strength.

## 2025-04-29 ENCOUNTER — APPOINTMENT (OUTPATIENT)
Dept: NUTRITION | Facility: HOSPITAL | Age: 50
End: 2025-04-29
Payer: COMMERCIAL

## 2025-05-01 ENCOUNTER — TREATMENT (OUTPATIENT)
Dept: PHYSICAL THERAPY | Facility: CLINIC | Age: 50
End: 2025-05-01
Payer: COMMERCIAL

## 2025-05-01 DIAGNOSIS — M25.512 CHRONIC LEFT SHOULDER PAIN: ICD-10-CM

## 2025-05-01 DIAGNOSIS — G89.29 CHRONIC LEFT SHOULDER PAIN: ICD-10-CM

## 2025-05-01 PROCEDURE — 97110 THERAPEUTIC EXERCISES: CPT | Mod: GP,CQ

## 2025-05-01 PROCEDURE — 97140 MANUAL THERAPY 1/> REGIONS: CPT | Mod: GP,CQ

## 2025-05-01 ASSESSMENT — PAIN SCALES - GENERAL: PAINLEVEL_OUTOF10: 3

## 2025-05-01 ASSESSMENT — PAIN - FUNCTIONAL ASSESSMENT: PAIN_FUNCTIONAL_ASSESSMENT: 0-10

## 2025-05-01 NOTE — PROGRESS NOTES
Physical Therapy Treatment    Patient Name: Nubia Martinez  MRN: [id number]  Today's Date: 5/1/2025  Time Calculation  Start Time: 0920  Stop Time: 1001  Time Calculation (min): 41 min    PT Therapeutic Procedures Time Entry  Manual Therapy Time Entry: 10  Therapeutic Exercise Time Entry: 29       Assessment: Patient identified by name and date of birth. Patient able to perform UBE with fair tolerance, some discomfort with bwd motion. Cues to engage scap with IR/ER as patient tends to roll shoulder fwd. Patient able to make it to level 22 with wall walks, no c/o of increased pain.     PT Assessment  PT Assessment Results: Decreased strength, Decreased range of motion, Decreased endurance  Rehab Prognosis: Good  Assessment Comment: Will benefit from skilled PT.    Plan: Plan to continue with L shoulder strengthening and ROM to allow for improved tolerance to reaching OH for sustained periods.     OP PT Plan  Treatment/Interventions: Cryotherapy, Hot pack, Manual therapy, Neuromuscular re-education, Therapeutic activities, Therapeutic exercises, Dry needling  PT Plan: Skilled PT  PT Frequency: 2 times per week  Duration: 3 weeks or as needed  Onset Date: 04/28/25  Rehab Potential: Good  Plan of Care Agreement: Patient      Subjective  Patient reports compliance with HEP and expressed good understanding. Reports low pain levels in L shoulder today, 3/10. States that she has difficulty keep L L          Precautions     Pain  Pain Assessment: 0-10  0-10 (Numeric) Pain Score: 3  Pain Type: Chronic pain  Pain Location: Shoulder  Pain Orientation: Left          Current Problem  1. Chronic left shoulder pain  Follow Up In Physical Therapy          Reason for Referral: L shoulder pain  Referred By: Dr. Neri  General Comment: Visit #1/5  Objective:   162* of L shoulder flexion    Treatments:     UBE 2' fwd/bwd ea N  Pulleys 3' (after IASTM)  Resisted rows x 15 Purple  B shoulder extension x10 Purple N  Resisted IR x 10  Green  Resisted ER x 10   Wall walks x 10   S/l ER x10 N      Manual:  STW/IASTM to L Delt  PROM all directions      OP EDUCATION:  Access Code: [email or web address]  URL: [email or web address]  Date: 05/01/2025  Prepared by: Rosette Arrington    Exercises  - Sidelying Shoulder External Rotation  - 1 x daily - 7 x weekly - 3 sets - 10 reps  - Shoulder extension with resistance - Neutral  - 1 x daily - 7 x weekly - 3 sets - 10 reps  Outpatient Education  Individual(s) Educated: Patient  Education Provided: Anatomy, Home Exercise Program, POC  Risk and Benefits Discussed with Patient/Caregiver/Other: yes  Patient/Caregiver Demonstrated Understanding: yes  Plan of Care Discussed and Agreed Upon: yes  Patient Response to Education: Patient/Caregiver Verbalized Understanding of Information  Education Comment: HEP, POC, anatomy    Goals:  Active       Goals       HEP       Start:  04/28/25    Expected End:  12/31/25       Patient will be independent with HEP to improve self management of symptoms.          ROM       Start:  04/28/25    Expected End:  12/31/25       Patient will improve L shoulder AROM to WFL to improve mobility.          Strength       Start:  04/28/25    Expected End:  12/31/25       Patient will improve left shoulder strength to 5/5 to increase functional strength.

## 2025-05-05 ENCOUNTER — TREATMENT (OUTPATIENT)
Dept: PHYSICAL THERAPY | Facility: CLINIC | Age: 50
End: 2025-05-05
Payer: COMMERCIAL

## 2025-05-05 DIAGNOSIS — G89.29 CHRONIC LEFT SHOULDER PAIN: ICD-10-CM

## 2025-05-05 DIAGNOSIS — M25.512 CHRONIC LEFT SHOULDER PAIN: ICD-10-CM

## 2025-05-05 PROCEDURE — 97140 MANUAL THERAPY 1/> REGIONS: CPT | Mod: GP,CQ

## 2025-05-05 PROCEDURE — 97110 THERAPEUTIC EXERCISES: CPT | Mod: GP,CQ

## 2025-05-05 ASSESSMENT — PAIN - FUNCTIONAL ASSESSMENT: PAIN_FUNCTIONAL_ASSESSMENT: 0-10

## 2025-05-05 ASSESSMENT — PAIN SCALES - GENERAL: PAINLEVEL_OUTOF10: 2

## 2025-05-05 NOTE — PROGRESS NOTES
Physical Therapy Treatment    Patient Name: Nubia Martinez  MRN: [id number]  Today's Date: 5/5/2025  Time Calculation  Start Time: 1403  Stop Time: 1443  Time Calculation (min): 40 min  PT Therapeutic Procedures Time Entry  Manual Therapy Time Entry: 10  Therapeutic Exercise Time Entry: 30       Assessment:   Patient able to tolerate progressions of reps and resistance with no c/o increased symptoms. Patient demo's fatigue with stability type exercises. Demo's tenderness with palpation along L bicep/lateral delt. Demo's slight soreness at end of session.     Plan:  Continue to work on improving strength and decreasing pain to be able to perform household chores with little to no difficulty. -AB.     OP PT Plan  Treatment/Interventions: Cryotherapy, Hot pack, Manual therapy, Neuromuscular re-education, Therapeutic activities, Therapeutic exercises, Dry needling  PT Plan: Skilled PT  PT Frequency: 2 times per week  Duration: 3 weeks or as needed  Onset Date: 04/28/25  Rehab Potential: Good  Plan of Care Agreement: Patient    Current Problem  Problem List Items Addressed This Visit           ICD-10-CM    Chronic left shoulder pain M25.512, G89.29       Subjective   General  Reason for Referral: L shoulder pain  Referred By: Dr. Neri  General Comment: Visit #2/5  Visit: 3  HEP: Yes  Patient states that she is compliant with her HEP.     Precautions       Pain  Pain Assessment: 0-10  0-10 (Numeric) Pain Score: 2  Pain Type: Chronic pain  Pain Location: Shoulder  Pain Orientation: Left    Objective     Treatments:  Therapeutic Exercise: 30 minutes, 2 units  UBE 3' fwd/bwd ea   Pulleys 3' (after IASTM)  Resisted rows 2x10 Pink (P, resistance, reps)   B shoulder extension 2x10 Pink (P, resistance, reps)  Resisted IR 2x10 Mint Green L (P, reps)   Resisted ER 2x10 L Lime green (P, reps)   Finger ladder x 10 to 21 L   Ball on wall Flex/Ext/Side to side/CW/CCW x15 L (N)  S/l ER x10       Manual: 10 minutes, 1 unit  STW/IASTM to L  Delt  PROM all directions    OP EDUCATION:   Access Code: [email or web address]  URL: [email or web address]  Date: 05/01/2025  Prepared by: Rosette Arrington    Exercises  - Sidelying Shoulder External Rotation  - 1 x daily - 7 x weekly - 3 sets - 10 reps  - Shoulder extension with resistance - Neutral  - 1 x daily - 7 x weekly - 3 sets - 10 reps    Goals:  Active       Goals       HEP       Start:  04/28/25    Expected End:  12/31/25       Patient will be independent with HEP to improve self management of symptoms.          ROM       Start:  04/28/25    Expected End:  12/31/25       Patient will improve L shoulder AROM to WFL to improve mobility.          Strength       Start:  04/28/25    Expected End:  12/31/25       Patient will improve left shoulder strength to 5/5 to increase functional strength.

## 2025-05-07 ENCOUNTER — TREATMENT (OUTPATIENT)
Dept: PHYSICAL THERAPY | Facility: CLINIC | Age: 50
End: 2025-05-07
Payer: COMMERCIAL

## 2025-05-07 DIAGNOSIS — G89.29 CHRONIC LEFT SHOULDER PAIN: ICD-10-CM

## 2025-05-07 DIAGNOSIS — M25.512 CHRONIC LEFT SHOULDER PAIN: ICD-10-CM

## 2025-05-07 PROCEDURE — 97140 MANUAL THERAPY 1/> REGIONS: CPT | Mod: GP

## 2025-05-07 PROCEDURE — 97110 THERAPEUTIC EXERCISES: CPT | Mod: GP

## 2025-05-07 ASSESSMENT — PAIN SCALES - GENERAL: PAINLEVEL_OUTOF10: 3

## 2025-05-07 ASSESSMENT — PAIN - FUNCTIONAL ASSESSMENT: PAIN_FUNCTIONAL_ASSESSMENT: 0-10

## 2025-05-07 NOTE — PROGRESS NOTES
"Physical Therapy          Physical Therapy Treatment          Patient Name: Nubia Martinez     MRN: [id number]     : 1975      Evelio Neri     Today's Date: 2025     Time Calculation  Start Time: 915  Stop Time: 955  Time Calculation (min): 40 min     PT Therapeutic Procedures Time Entry  Manual Therapy Time Entry: 10  Therapeutic Exercise Time Entry: 30                       General     Reason for Referral: L shoulder pain  Referred By: Dr. Neri  General Comment: Visit #3/5     Visit #4           Current Problem     Problem List Items Addressed This Visit           ICD-10-CM       Musculoskeletal and Injuries    Chronic left shoulder pain M25.512, G89.29                        Subjective      Pt states \"I was a little sore after last time      Pt.'s name and  were confirmed this date.          Pt. Reports compliance with HEP.          Precautions     Precautions  Precautions Comment: None listed          Pain     Pain Assessment: 0-10  0-10 (Numeric) Pain Score: 3  Pain Type: Chronic pain  Pain Location: Shoulder  Pain Orientation: Left          Objective                      Pt's name and  were confirmed today.            Treatments:          Therapeutic exercise   UBE 3' fwd/bwd ea   Pulleys 3' (after IASTM)  Resisted rows 2x10 Pink (P, resistance, reps)   B shoulder extension 2x10 Pink (P, resistance, reps)  Finger ladder x 10 to 21 L   Ball on wall Flex/Ext/Side to side/CW/CCW x15 L (N)  Wall foam roll shoulder flexion/spinal extension x10        Manual    Grade 1-2 GH mobilizations (lateral, inferior, posterior) 4x30\" each  PROM flexion x10  Pec. Minor stretch 2x30          PATIENT EDUCATION:   Outpatient Education  Individual(s) Educated: Patient  Education Provided: Anatomy, Body Mechanics, Home Exercise Program, POC, Posture  Risk and Benefits Discussed with Patient/Caregiver/Other: yes  Patient/Caregiver Demonstrated Understanding: yes  Plan of Care Discussed and Agreed Upon: yes  Patient " Response to Education: Patient/Caregiver Verbalized Understanding of Information          Assessment:   Pt denies increased pain throughout.               Plan:     OP PT Plan  Treatment/Interventions: Cryotherapy, Hot pack, Manual therapy, Neuromuscular re-education, Therapeutic activities, Therapeutic exercises, Dry needling  PT Plan: Skilled PT  PT Frequency: 2 times per week  Duration: 3 weeks or as needed  Onset Date: 04/28/25  Rehab Potential: Good  Plan of Care Agreement: Patient          Goals:     Active       Goals       HEP       Start:  04/28/25    Expected End:  12/31/25       Patient will be independent with HEP to improve self management of symptoms.          ROM       Start:  04/28/25    Expected End:  12/31/25       Patient will improve L shoulder AROM to WFL to improve mobility.          Strength       Start:  04/28/25    Expected End:  12/31/25       Patient will improve left shoulder strength to 5/5 to increase functional strength.

## 2025-05-12 ENCOUNTER — TREATMENT (OUTPATIENT)
Dept: PHYSICAL THERAPY | Facility: CLINIC | Age: 50
End: 2025-05-12
Payer: COMMERCIAL

## 2025-05-12 DIAGNOSIS — G89.29 CHRONIC LEFT SHOULDER PAIN: ICD-10-CM

## 2025-05-12 DIAGNOSIS — M25.512 CHRONIC LEFT SHOULDER PAIN: ICD-10-CM

## 2025-05-12 PROCEDURE — 97110 THERAPEUTIC EXERCISES: CPT | Mod: GP,CQ | Performed by: PHYSICAL THERAPY ASSISTANT

## 2025-05-12 PROCEDURE — 97140 MANUAL THERAPY 1/> REGIONS: CPT | Mod: GP,CQ | Performed by: PHYSICAL THERAPY ASSISTANT

## 2025-05-12 ASSESSMENT — PAIN SCALES - GENERAL: PAINLEVEL_OUTOF10: 4

## 2025-05-12 ASSESSMENT — PAIN - FUNCTIONAL ASSESSMENT: PAIN_FUNCTIONAL_ASSESSMENT: 0-10

## 2025-05-12 NOTE — PROGRESS NOTES
Physical Therapy    Physical Therapy Treatment    Patient Name: Nubia Martinez  MRN: [id number]  Today's Date: 2025  Time Calculation  Start Time: 1230  Stop Time: 1315  Time Calculation (min): 45 min  PT Therapeutic Procedures Time Entry  Manual Therapy Time Entry: 10  Therapeutic Exercise Time Entry: 30                   Current Problem  Problem List Items Addressed This Visit           ICD-10-CM       Musculoskeletal and Injuries    Chronic left shoulder pain M25.512, G89.29        General  Reason for Referral: L shoulder pain  Referred By: Dr. Neri  General Comment: Visit     Subjective  Patient reports no falls and states 4/10 left shoulder pain.    Precautions  Precautions  Precautions Comment: None listed    Pain  Pain Assessment: 0-10  0-10 (Numeric) Pain Score: 4  Pain Type: Chronic pain  Pain Location: Shoulder  Pain Orientation: Left    Objective   Therapeutic Exercise: 30 minutes, 2 units  UBE 3' fwd/bwd ea   Pulleys 3' (after IASTM)  Resisted rows 2x10 Pink   B shoulder extension 2x10 Pink   Resisted IR 2x10 Mint Green L   Resisted ER 2x10 L Lime green   Finger ladder x 10 to 21 L   Ball on wall Flex/Ext/Side to side/CW/CCW x15 L   S/l ER x10       Manual: 10 minutes, 1 unit  STW/IASTM to L Delt  PROM all directions      Treatments:    OP Education      Patient tolerated treatment without increased pain in shoulder.  Patient has good form/understanding with there-ex.   Continue with left shoulder ROM/strength/STW to decrease pain, improve function for work/ADL.  Assessment  Patient verified by name and .    Plan  Continue with there-ex, strength/STW to improve sleep, dressing, ADL.  Treatment/Interventions: Cryotherapy, Hot pack, Manual therapy, Neuromuscular re-education, Therapeutic activities, Therapeutic exercises, Dry needling  PT Plan: Skilled PT  PT Frequency: 2 times per week  Duration: 3 weeks or as needed  Onset Date: 25  Rehab Potential: Good  Plan of Care Agreement:  Patient    Active       Goals       HEP       Start:  04/28/25    Expected End:  12/31/25       Patient will be independent with HEP to improve self management of symptoms.          ROM       Start:  04/28/25    Expected End:  12/31/25       Patient will improve L shoulder AROM to WFL to improve mobility.          Strength       Start:  04/28/25    Expected End:  12/31/25       Patient will improve left shoulder strength to 5/5 to increase functional strength.

## 2025-05-13 NOTE — DISCHARGE INSTRUCTIONS
SEE CARE AFTER FOR A PELVIC PROCEDURE AND GENERAL ANESTHESIA FORMS PROVIDED. HAD TYLENOL 975 MG AT 06:55 AM, MAY TAKE AFTER 02:55 PM IF NEEDED   13-May-2025 06:45

## 2025-05-15 ENCOUNTER — TREATMENT (OUTPATIENT)
Dept: PHYSICAL THERAPY | Facility: CLINIC | Age: 50
End: 2025-05-15
Payer: COMMERCIAL

## 2025-05-15 DIAGNOSIS — G89.29 CHRONIC LEFT SHOULDER PAIN: ICD-10-CM

## 2025-05-15 DIAGNOSIS — M25.512 CHRONIC LEFT SHOULDER PAIN: ICD-10-CM

## 2025-05-15 PROCEDURE — 97140 MANUAL THERAPY 1/> REGIONS: CPT | Mod: GP,CQ

## 2025-05-15 PROCEDURE — 97110 THERAPEUTIC EXERCISES: CPT | Mod: GP,CQ

## 2025-05-15 ASSESSMENT — PAIN - FUNCTIONAL ASSESSMENT: PAIN_FUNCTIONAL_ASSESSMENT: 0-10

## 2025-05-15 ASSESSMENT — PAIN SCALES - GENERAL: PAINLEVEL_OUTOF10: 4

## 2025-05-15 NOTE — PROGRESS NOTES
Physical Therapy Treatment    Patient Name: Nubia Martinez  MRN: [id number]  Today's Date: 5/15/2025  Time Calculation  Start Time: 1000  Stop Time: 1044  Time Calculation (min): 44 min    PT Therapeutic Procedures Time Entry  Manual Therapy Time Entry: 15  Therapeutic Exercise Time Entry: 27       Assessment: Patient identified by name and date of birth. Added cupping today, patient had increased tenderness and tissue restriction. Added wand AAROM today to add to HEP to address ROM limitations.          Plan: Plan to continue with STW, ROM and postural strengthening to allow for improved UE functional mobility.     OP PT Plan  Treatment/Interventions: Cryotherapy, Hot pack, Manual therapy, Neuromuscular re-education, Therapeutic activities, Therapeutic exercises, Dry needling  PT Plan: Skilled PT  PT Frequency: 2 times per week  Duration: 3 weeks or as needed  Onset Date: 04/28/25  Rehab Potential: Good  Plan of Care Agreement: Patient       Subjective  Patient reports compliance with HEP and expressed good understanding. Patient reports that she continues to struggle with moving UE up and down. States that she has not noticed improvement in ROM or pain.           Precautions  Precautions  Precautions Comment: None listed  Pain  Pain Assessment: 0-10  0-10 (Numeric) Pain Score: 4  Pain Type: Chronic pain  Pain Location: Scrotum  Pain Orientation: Left          Current Problem  1. Chronic left shoulder pain  Follow Up In Physical Therapy          Reason for Referral: L shoulder pain  Referred By: Dr. Neri  General Comment: Visit 5/5  Objective:   DEBBIE GAVIN GH flexion of 139*    Treatments:     UBE 3' fwd/bwd ea   Pulleys 3' (after IASTM)  Resisted rows 2x10 Pink   B shoulder extension 2x10 Pink   Resisted IR 2x10 Mint Green L   Resisted ER 2x10 L Lime green   Finger ladder x 10 to 21 L Held  Ball on wall Flex/Ext/Side to side/CW/CCW x15 L   S/l ER x10   Supine wand flex, abd ER x10 N  Seated wand extension x10  "N      Manual: 10 minutes, 1 unit  STW/IASTM/cupping to L Delt, infraspinatus, supraspinatus  PROM all directions     Access Code: [email or web address]  URL: [email or web address]  Date: 05/15/2025  Prepared by: Rosette Arrington    Exercises  - Supine Shoulder Flexion Extension AAROM with Dowel  - 1 x daily - 7 x weekly - 10 reps - 5\" hold  - Supine Shoulder Abduction AAROM with Dowel  - 1 x daily - 7 x weekly - 10 reps - 5\" hold  - Standing Shoulder Extension with Dowel  - 1 x daily - 7 x weekly - 10 reps - 5\" hold  - Standing Shoulder External Rotation AAROM with Dowel  - 1 x daily - 7 x weekly - 10 reps - 5\" hold      Outpatient Education  Individual(s) Educated: Patient  Education Provided: Anatomy, Body Mechanics, Home Exercise Program, POC, Posture  Risk and Benefits Discussed with Patient/Caregiver/Other: yes  Patient/Caregiver Demonstrated Understanding: yes  Plan of Care Discussed and Agreed Upon: yes  Patient Response to Education: Patient/Caregiver Verbalized Understanding of Information    Goals:  Active       Goals       HEP       Start:  04/28/25    Expected End:  12/31/25       Patient will be independent with HEP to improve self management of symptoms.          ROM       Start:  04/28/25    Expected End:  12/31/25       Patient will improve L shoulder AROM to WFL to improve mobility.          Strength       Start:  04/28/25    Expected End:  12/31/25       Patient will improve left shoulder strength to 5/5 to increase functional strength.             "

## 2025-05-19 ENCOUNTER — TREATMENT (OUTPATIENT)
Dept: PHYSICAL THERAPY | Facility: CLINIC | Age: 50
End: 2025-05-19
Payer: COMMERCIAL

## 2025-05-19 DIAGNOSIS — G89.29 CHRONIC LEFT SHOULDER PAIN: ICD-10-CM

## 2025-05-19 DIAGNOSIS — M25.512 CHRONIC LEFT SHOULDER PAIN: ICD-10-CM

## 2025-05-19 PROCEDURE — 97110 THERAPEUTIC EXERCISES: CPT | Mod: GP | Performed by: PHYSICAL THERAPIST

## 2025-05-19 NOTE — PROGRESS NOTES
Physical Therapy    Physical Therapy Treatment    Patient Name: Nubia Martinez  MRN: [id number]  Today's Date: 5/19/2025    Time Entry:   Time Calculation  Start Time: 0930  Stop Time: 0945  Time Calculation (min): 15 min     PT Therapeutic Procedures Time Entry  Therapeutic Exercise Time Entry: 15    Assessment:  Good tolerance to everything in PT session. Patient had multiple sessions of PT and has not seen any improvements in left shoulder pain or ROM. She continues to have moderate pain and has difficulty with tasks, such as dressing. Discharge from PT.     Plan:  Discharge from PT; return to physician     Current Problem  1. Chronic left shoulder pain  Follow Up In Physical Therapy        Subjective    Patient reports that her symptoms have not improved in her left shoulder. She sees her MD again in several weeks. She reports that she has difficulty with completing her HEP due to pain. She states that her left shoulder pain is a 6/10. She is also starting to feel pain in her right shoulder. Overall, she has not had improvements in pain or ROM in her left shoulder since beginning PT. Patient is in agreement with discharge from PT and will return to her physician for her persistent symptoms. Interestingly, she also reports intermittent numbness in her left UE that goes down into her left hand.     Objective   Fair sitting posture overall     L shoulder AROM  160 degrees flexion --> 140 degrees today  160 abduction --> 100 degrees today  C7 Functional ER today  L2 Functional IR today  50 degrees of L shoulder ER AROM, arm at side      L elbow AROM is WFL     L shoulder strength  4+/5 flexion today  4+/5 abduction today  4+/5 IR today  4+/5 ER today     L elbow strength  4+/5 extension today  4+/5 flexion today     Negative Empty Can B --> negative today  Negative De Oliveira-Froilan B--> positive on left   Negative Spurling's test today  Negative cervical distraction test today      Tender to palpation at L greater  tuberosity today    Outcome Measures:  Initial Quick DASH 38.6 on 4/28/25  Final Quick DASH 54.5 today     Treatments:  Ball on wall - reviewed HEP  Pulleys - reviewed HEP  Side lying ER - reviewed HEP  Resisted rows - reviewed HEP  Abduction AAROM - reviewed HEP  Flexion AAROM - reviewed HEP   Education on HEP; stop if painful     OP EDUCATION:  Discharge from PT, follow up with physician, continue HEP but stop if painful, reviewed HEP    Goals:  Active       Goals       HEP       Start:  04/28/25    Expected End:  12/31/25       Patient will be independent with HEP to improve self management of symptoms.          ROM       Start:  04/28/25    Expected End:  12/31/25       Patient will improve L shoulder AROM to WFL to improve mobility.          Strength       Start:  04/28/25    Expected End:  12/31/25       Patient will improve left shoulder strength to 5/5 to increase functional strength.

## 2025-06-01 DIAGNOSIS — E11.65 TYPE 2 DIABETES MELLITUS WITH HYPERGLYCEMIA, WITHOUT LONG-TERM CURRENT USE OF INSULIN: ICD-10-CM

## 2025-06-04 ENCOUNTER — HOSPITAL ENCOUNTER (OUTPATIENT)
Dept: RADIOLOGY | Facility: EXTERNAL LOCATION | Age: 50
Discharge: HOME | End: 2025-06-04

## 2025-06-04 ENCOUNTER — HOSPITAL ENCOUNTER (OUTPATIENT)
Dept: RADIOLOGY | Facility: CLINIC | Age: 50
Discharge: HOME | End: 2025-06-04
Payer: COMMERCIAL

## 2025-06-04 ENCOUNTER — APPOINTMENT (OUTPATIENT)
Dept: ORTHOPEDIC SURGERY | Facility: CLINIC | Age: 50
End: 2025-06-04
Payer: COMMERCIAL

## 2025-06-04 DIAGNOSIS — G89.29 CHRONIC PAIN OF BOTH KNEES: ICD-10-CM

## 2025-06-04 DIAGNOSIS — M25.512 CHRONIC LEFT SHOULDER PAIN: ICD-10-CM

## 2025-06-04 DIAGNOSIS — M25.562 CHRONIC PAIN OF BOTH KNEES: ICD-10-CM

## 2025-06-04 DIAGNOSIS — G89.29 CHRONIC LEFT SHOULDER PAIN: ICD-10-CM

## 2025-06-04 DIAGNOSIS — M25.561 CHRONIC PAIN OF BOTH KNEES: ICD-10-CM

## 2025-06-04 PROCEDURE — 4010F ACE/ARB THERAPY RXD/TAKEN: CPT | Performed by: SPECIALIST

## 2025-06-04 PROCEDURE — 73564 X-RAY EXAM KNEE 4 OR MORE: CPT | Mod: LEFT SIDE | Performed by: RADIOLOGY

## 2025-06-04 PROCEDURE — 73564 X-RAY EXAM KNEE 4 OR MORE: CPT | Mod: LT

## 2025-06-04 PROCEDURE — 1036F TOBACCO NON-USER: CPT | Performed by: SPECIALIST

## 2025-06-04 PROCEDURE — 3051F HG A1C>EQUAL 7.0%<8.0%: CPT | Performed by: SPECIALIST

## 2025-06-04 PROCEDURE — 99214 OFFICE O/P EST MOD 30 MIN: CPT | Performed by: SPECIALIST

## 2025-06-04 ASSESSMENT — PAIN DESCRIPTION - DESCRIPTORS
DESCRIPTORS: DULL;ACHING
DESCRIPTORS: SHARP;SHOOTING

## 2025-06-04 ASSESSMENT — PAIN SCALES - GENERAL
PAINLEVEL_OUTOF10: 5 - MODERATE PAIN
PAINLEVEL_OUTOF10: 6

## 2025-06-04 ASSESSMENT — PAIN - FUNCTIONAL ASSESSMENT: PAIN_FUNCTIONAL_ASSESSMENT: 0-10

## 2025-06-04 NOTE — PROGRESS NOTES
Assessment/Plan   Encounter Diagnoses:  Chronic pain of both knees    Chronic left shoulder pain  Chronic left shoulder pain  Assessment: Left shoulder with a small area of calcific tendinitis  Left knee osteoarthritis  Right knee pain with some degenerative changes    Plan:  Physical therapy for left shoulder impingement and calcific tendinitis.  Left knee brace.  Ultrasound of  left knee and the right shoulder were obtained and reviewed with the patient under real-time.  Follow-up PRN  Rheumatology consult       Subjective    Patient ID: Nubia Martinez is a 50 y.o. female.    Chief Complaint: Follow-up and Pain of the Left Knee and Pain and Follow-up of the Left Shoulder (/)     Last Surgery: No surgery found  Last Surgery Date: No surgery found    HPI  50-year-old who comes in today stating that she is actually having pain in both shoulders she feels like the right may be developing calcific tendinitis.  Her left knee is bothering her and both hips are sore as well.    OBJECTIVE: ORTHO EXAM    Left knee:  Skin healthy and intact  No gross swelling or ecchymosis  Alignment: Neutral  Effusion: Scant  ROM: 0 degrees Extension   120 degrees Flexion  Mild crepitance with range of motion  No pain with internal rotation of the hip  Tenderness to palpation: Global     No laxity to valgus stress  No laxity to varus stress  Negative Lachman´s test  Negative posterior drawer test  Mild pain with Richard´s test     Neurovascular exam normal distally  2+ DP pulse and good cap refill    Right shoulder:  Inspection:  Skin healthy to gross inspection  No ecchymosis, no edema, no gross atrophy    Palpation:  Acromioclavicular joint no tenderness   Biceps tendon/ groove no tenderness  Anterior Acromial Bursal Area minimal tenderness  Cervical spine no tenderness  Tender to palpation over the greater trochanter at the supraspinatus insertion     ROM:  Forward Flexion 160 degrees active  External Rotation 70 degrees at 90 degrees  abduction  Internal Rotation 60 degrees at 90 degrees abduction    Strength:  4+/5 Supraspinatus isolation- resisted elevation  4+/5 Infraspinatus isolation- ER  5/5 Subscapularis- IR     Negative lift off test   Negative Spurling´s test  [Negative] Neer and Hawking´s test  Negative Speed's test  Negative Inferior Sulcus  Negative Anterior Apprehension    Full unrestricted motion at Elbow/Wrist/Hand  Neurovascular exam normal distally        IMAGE RESULTS:  Point of Care Ultrasound  These images are not reportable by radiology and will not be interpreted   by  Radiologists.      ULTRASOUND  DIAGNOSTIC ULTRASOUND REPORT FINAL: Left KNEE  Sonographer: Evelio Neri MD  Indication: Knee Pain  Procedure: Ultrasound, extremity, nonvascular, real-time, COMPLETE, anatomic specific  Technique: B-Mode Ultrasound Examination performed using 6- 9 MHz linear transducer with [employer] Software  STUDY TYPE:   1. ULTRASOUND EXTREMITY  2. REAL TIME WITH IMAGE DOCUMENTATION  3. NON-VASCULAR  4. COMPLETE STUDY, INCLUDING BUT NOT LIMITED TO MUSCLE, TENDONS, LIGAMENTS, SOFT TISSUES, ADIPOSE TISSUE AND SUBCUTANEOUS TISSUE.  Site: KNEE   Live ultrasound was performed with of patient's  KNEE and PERMANENTLY documented. I personally performed the ultrasound and reviewed the findings. These show:    Darline-articular evaluation:   An intact Quadriceps Tendon with the Quadriceps Muscle fibers showing normal striations Quadriceps Tendon demonstrating normal fibrillar pattern. . The Patellar Tendon demonstrates normal fibrillar pattern and is intact.   No significant soft tissue fluid collection/abscess appreciated.     Joint Evaluation:  The lateral joint line shows an intact LCL.   Medial joint line exam shows an intact MCL.   The patellar tendon was within normal limits.  Scant joint effusion noted.     The patient tolerated the procedure well.    DIAGNOSTIC ULTRASOUND FINAL REPORT: Right SHOULDER  Provider: Evelio Neri MD  Date of Exam:  Today  Procedure: Ultrasound, extremity, nonvascular, real-time, COMPLETE, anatomic specific.  Site:   SHOULDER  Indication:  SHOULDER PAIN  Technique: B-Mode Ultrasound Examination performed using 8-13 MHz linear transducer with [employer] Software  STUDY TYPE:   1. ULTRASOUND EXTREMITY INCLUDING BUT NOT LIMITED TO SHOULDER MUSCLE, TENDONS, LIGAMENTS, FATTY TISSUES, SUBCUTANEOUS TISSUES AND OTHER SOFT TISSUE STRUCTURES SUCH AS ABSCESSES OR FREE FLUID ACCUMULATION WITHIN THE PRIMARY JOINT AS WELL AS ADJACENT JOINTS.  2. REAL TIME WITH IMAGE DOCUMENTATION  3. NON-VASCULAR  4. COMPLETE STUDY WHICH INCLUDES A THOROUGH EVALUATION OF THE SHOULDER MUSCLE, TENDONS, LIGAMENTS, FATTY TISSUES, SUBCUTANEOUS TISSUES AND OTHER SOFT TISSUE STRUCTURES SUCH AS ABSCESSES OR FREE FLUID ACCUMULATION WITHIN THE PRIMARY JOINT AS WELL AS ADJACENT JOINTS.  Live ultrasound was performed of the patient´s  SHOULDER and PERMANENTLY documented.  This is a thorough and complete evaluation of a specific anatomic region specifically the  SHOULDER.  PERMANENT Image documentation was performed.  This is the complete and final ultrasound report of the patient's  SHOULDER.  The patient was positioned in order to optimize the ultrasound evaluation of the  SHOULDER.  Ultrasound gel was used as a conductive medium in order to both transmit and receive ultrasonic signals that characterize the soft tissues. . I personally performed the ultrasound and reviewed the findings. These show:  Findings:  SHOULDER  Darline-articular evaluation: Live ultrasound was performed of the patient's  SHOULDER that shows tendinosis and possible calcific tendinitis of the supraspinatus and infraspinatus tendons with the deltoid muscle fibers showing normal striations.  There was no sub acromial effusion.  Evaluation of the subscapularis with the arm in external rotation showed an intact and normal appearing subscapularis tendon.    Joint Evaluation: The biceps tendon was visualized  within the bicipital groove.  At the distal insertion of the supraspinatus there was an area of hyper echoic signal with some shadowing that may represent calcification in the supraspinatus tendon insertion very peripherally  (laterally)    Procedures     Orders Placed This Encounter    XR knee left 4+ views    Point of Care Ultrasound    Referral to Rheumatology

## 2025-06-04 NOTE — ASSESSMENT & PLAN NOTE
Assessment: Left shoulder with a small area of calcific tendinitis  Left knee osteoarthritis  Right knee pain with some degenerative changes    Plan:  Physical therapy for left shoulder impingement and calcific tendinitis.  Left knee brace.  Ultrasound of  left knee and the right shoulder were obtained and reviewed with the patient under real-time.  Follow-up PRN  Rheumatology consult

## 2025-06-05 ENCOUNTER — APPOINTMENT (OUTPATIENT)
Dept: ORTHOPEDIC SURGERY | Facility: CLINIC | Age: 50
End: 2025-06-05
Payer: COMMERCIAL

## 2025-06-16 DIAGNOSIS — E11.65 TYPE 2 DIABETES MELLITUS WITH HYPERGLYCEMIA, WITHOUT LONG-TERM CURRENT USE OF INSULIN: ICD-10-CM

## 2025-06-16 RX ORDER — OMEPRAZOLE 40 MG/1
40 CAPSULE, DELAYED RELEASE ORAL DAILY
Qty: 90 CAPSULE | Refills: 1 | Status: SHIPPED | OUTPATIENT
Start: 2025-06-16

## 2025-06-26 LAB — ALBUMIN/CREATININE (UG/MG) IN URINE EXTERNAL: 8 UG/MG CREAT

## 2025-06-30 LAB
ALBUMIN/CREATININE (UG/MG) IN URINE EXTERNAL: 8 UG/MG CREAT
ESTIMATED AVERAGE GLUCOSE FOR HBA1C EXTERNAL: NORMAL
HEMOGLOBIN A1C/HEMOGLOBIN TOTAL IN BLOOD EXTERNAL: 7.7 %

## 2025-07-01 ENCOUNTER — APPOINTMENT (OUTPATIENT)
Age: 50
End: 2025-07-01
Payer: COMMERCIAL

## 2025-07-01 VITALS
HEIGHT: 65 IN | WEIGHT: 178 LBS | SYSTOLIC BLOOD PRESSURE: 120 MMHG | HEART RATE: 80 BPM | BODY MASS INDEX: 29.66 KG/M2 | DIASTOLIC BLOOD PRESSURE: 72 MMHG

## 2025-07-01 DIAGNOSIS — E11.65 TYPE 2 DIABETES MELLITUS WITH HYPERGLYCEMIA, WITHOUT LONG-TERM CURRENT USE OF INSULIN: ICD-10-CM

## 2025-07-01 DIAGNOSIS — R74.01 ELEVATED TRANSAMINASE LEVEL: ICD-10-CM

## 2025-07-01 DIAGNOSIS — E78.49 OTHER HYPERLIPIDEMIA: ICD-10-CM

## 2025-07-01 DIAGNOSIS — R19.8 PAIN ASSOCIATED WITH DEFECATION: ICD-10-CM

## 2025-07-01 DIAGNOSIS — Z87.19 HISTORY OF RECTAL BLEEDING: Primary | ICD-10-CM

## 2025-07-01 DIAGNOSIS — Z12.31 SCREENING MAMMOGRAM FOR BREAST CANCER: ICD-10-CM

## 2025-07-01 DIAGNOSIS — J30.2 SEASONAL ALLERGIES: ICD-10-CM

## 2025-07-01 DIAGNOSIS — N90.5 VULVAR ATROPHY: ICD-10-CM

## 2025-07-01 LAB
ALANINE AMINOTRANSFERASE (SGPT) (U/L) IN SER/PLAS EXTERNAL: NORMAL
ALBUMIN (G/DL) IN SER/PLAS EXTERNAL: NORMAL
ALKALINE PHOSPHATASE (U/L) IN SER/PLAS EXTERNAL: NORMAL
ANION GAP IN SER/PLAS EXTERNAL: NORMAL
ASPARTATE AMINOTRANSFERASE (SGOT) (U/L) IN SER/PLAS EXTERNAL: NORMAL
BILIRUBIN TOTAL (MG/DL) IN SER/PLAS EXTERNAL: NORMAL
CALCIUM (MG/DL) IN SER/PLAS EXTERNAL: NORMAL
CARBON DIOXIDE, TOTAL (MMOL/L) IN SER/PLAS EXTERNAL: NORMAL
CHLORIDE (MMOL/L) IN SER/PLAS EXTERNAL: NORMAL
CREATININE (MG/DL) IN SER/PLAS EXTERNAL: NORMAL
GLOMERULAR FILTRATION RATE ML/MIN/1.73 SQ M.PREDICTED EXTERNAL: 102 ML/MIN/1.73M*2
GLUCOSE (MG/DL) IN SER/PLAS EXTERNAL: NORMAL
POTASSIUM (MMOL/L) IN SER/PLAS EXTERMA;: NORMAL
PROTEIN TOTAL EXTERNAL: NORMAL
SODIUM (MMOL/L) IN SER/PLAS EXTERNAL: NORMAL
UREA NITROGEN (MG/DL) IN SER/PLAS EXTERNAL: NORMAL

## 2025-07-01 PROCEDURE — 4010F ACE/ARB THERAPY RXD/TAKEN: CPT | Performed by: FAMILY MEDICINE

## 2025-07-01 PROCEDURE — 99214 OFFICE O/P EST MOD 30 MIN: CPT | Performed by: FAMILY MEDICINE

## 2025-07-01 PROCEDURE — 3074F SYST BP LT 130 MM HG: CPT | Performed by: FAMILY MEDICINE

## 2025-07-01 PROCEDURE — 3078F DIAST BP <80 MM HG: CPT | Performed by: FAMILY MEDICINE

## 2025-07-01 PROCEDURE — 1036F TOBACCO NON-USER: CPT | Performed by: FAMILY MEDICINE

## 2025-07-01 PROCEDURE — 3051F HG A1C>EQUAL 7.0%<8.0%: CPT | Performed by: FAMILY MEDICINE

## 2025-07-01 PROCEDURE — 3008F BODY MASS INDEX DOCD: CPT | Performed by: FAMILY MEDICINE

## 2025-07-01 RX ORDER — FLUTICASONE PROPIONATE 50 MCG
2 SPRAY, SUSPENSION (ML) NASAL DAILY
Qty: 16 G | Refills: 3 | Status: SHIPPED | OUTPATIENT
Start: 2025-07-01

## 2025-07-01 RX ORDER — BLOOD-GLUCOSE SENSOR
1 EACH MISCELLANEOUS
COMMUNITY
End: 2025-07-01 | Stop reason: SDUPTHER

## 2025-07-01 RX ORDER — ESTRADIOL 0.1 MG/G
2 CREAM VAGINAL NIGHTLY
Qty: 34 G | Refills: 3 | Status: SHIPPED | OUTPATIENT
Start: 2025-07-01 | End: 2026-07-01

## 2025-07-01 RX ORDER — CETIRIZINE HYDROCHLORIDE 10 MG/1
10 TABLET ORAL DAILY PRN
Qty: 90 TABLET | Refills: 1 | Status: SHIPPED | OUTPATIENT
Start: 2025-07-01

## 2025-07-01 RX ORDER — BLOOD-GLUCOSE SENSOR
1 EACH MISCELLANEOUS
Qty: 3 EACH | Refills: 3 | Status: SHIPPED | OUTPATIENT
Start: 2025-07-01 | End: 2025-07-02

## 2025-07-01 RX ORDER — TIRZEPATIDE 2.5 MG/.5ML
2.5 INJECTION, SOLUTION SUBCUTANEOUS WEEKLY
Qty: 2 ML | Refills: 3 | Status: SHIPPED | OUTPATIENT
Start: 2025-07-01

## 2025-07-01 NOTE — PROGRESS NOTES
Patient presents for periodic surveillance of chronic medical problems.     Subjective  Nubia Martinez is a 50 y.o. female who presents for Follow-up.  HPI  Free stylelibre 3 ,ddm  Gi referral, history of pain with defecation and occasional brbpr, had rectal fissue years ago.  Knees, shoulder, did therapy, Dr Neri,referring to rheum  DM, A1c 7.7 on increased dose of steglatro, up from 7.4, discussed diet, she's lost weight with lifestyle changes, recommend trial of mounjuaro, she had tried ozempic in past and did not tolerate, side effects discussed of mounjaro  States feels tired all the time, most notable in afternoon.  Does not feel sad or depressed , gets good sleep., wakes up feeling well rested.  Review of Systems.    Allergies[1]    Medications Ordered Prior to Encounter[2]    Problem List[3]    Objective     Visit Vitals  /72 (BP Location: Left arm, Patient Position: Sitting)   Pulse 80      Physical Exam  Vitals and nursing note reviewed.   Constitutional:       General: She is not in acute distress.     Appearance: Normal appearance. She is not toxic-appearing.   HENT:      Head: Normocephalic and atraumatic.   Cardiovascular:      Rate and Rhythm: Normal rate and regular rhythm.      Heart sounds: No murmur heard.  Pulmonary:      Effort: Pulmonary effort is normal.      Breath sounds: Normal breath sounds.   Musculoskeletal:      Cervical back: Neck supple. No rigidity.      Comments:     Skin:     General: Skin is warm and dry.   Neurological:      General: No focal deficit present.      Mental Status: She is alert and oriented to person, place, and time.   Psychiatric:         Mood and Affect: Mood normal.         Behavior: Behavior normal.       No visits with results within 1 Month(s) from this visit.   Latest known visit with results is:   Office Visit on 04/01/2025   Component Date Value Ref Range Status    Hemoglobin A1C External 06/26/2025 7.7  % In process    Albumin/Creatine Ratio external  06/26/2025 8.0  ug/mg Creat In process         Assessment/Plan   Problem List Items Addressed This Visit       Hyperlipidemia    Type 2 diabetes mellitus with hyperglycemia, without long-term current use of insulin    Relevant Medications    tirzepatide (Mounjaro) 2.5 mg/0.5 mL pen injector    Other Relevant Orders    Comprehensive Metabolic Panel    Hemoglobin A1C    TSH with reflex to Free T4 if abnormal    Hemoglobin and hematocrit, blood     Other Visit Diagnoses         History of rectal bleeding    -  Primary    Relevant Orders    Referral to Gastroenterology      Pain associated with defecation        Relevant Orders    Referral to Gastroenterology      Screening mammogram for breast cancer        Relevant Orders    BI mammo bilateral screening tomosynthesis      Elevated transaminase level                       Lisandra Denney MD          [1] No Known Allergies  [2]   Current Outpatient Medications on File Prior to Visit   Medication Sig Dispense Refill    atorvastatin (Lipitor) 40 mg tablet Take 1 tablet (40 mg) by mouth once daily. 90 tablet 1    calcium carbonate (Tums) 200 mg calcium chewable tablet Chew and swallow 2 tablets once daily.      diclofenac sodium (Voltaren) 1 % gel Apply 4.5 inches (4 g) topically 4 times a day as needed (as needed for pain). 100 g 1    fluticasone (Flovent) 110 mcg/actuation inhaler Inhale 1 puff 2 times a day. Rinse mouth with water after use to reduce aftertaste and incidence of candidiasis. Do not swallow. 12 g 1    FreeStyle Marciano 14 Day Sensor kit USE AS DIRECTED EVERY 14 days 1 each 5    losartan (Cozaar) 25 mg tablet Take 1 tablet (25 mg) by mouth once daily. as directed 90 tablet 1    metFORMIN  mg 24 hr tablet Take 2 tablets (1,000 mg) by mouth 2 times daily (morning and late afternoon). Do not crush, chew, or split. 360 tablet 1    montelukast (Singulair) 10 mg tablet TAKE ONE TABLET BY MOUTH ONCE DAILY 90 tablet 3    multivitamin-children's (Cerovite, Jr)  chewable tablet Chew and swallow 1 tablet once daily.      omeprazole (PriLOSEC) 40 mg DR capsule Take 1 capsule (40 mg) by mouth once daily. 90 capsule 1    [DISCONTINUED] cetirizine (ZyrTEC) 10 mg tablet Take 1 tablet (10 mg) by mouth once daily as needed for allergies. 90 tablet 1    [DISCONTINUED] ertugliflozin (Steglatro) 15 mg tablet Take 1 tablet (15 mg) by mouth once daily. 30 tablet 3    [DISCONTINUED] estradiol (Estrace) 0.01 % (0.1 mg/gram) vaginal cream Insert 0.5 Applicatorfuls (2 g) into the vagina once daily at bedtime. At bedtime for 2 weeks, then at bedtime twice a week. 34 g 3    [DISCONTINUED] fluticasone (Flonase) 50 mcg/actuation nasal spray Administer 2 sprays into each nostril once daily. Shake gently. Before first use, prime pump. After use, clean tip and replace cap.      [DISCONTINUED] Steglatro TAKE 1 TABLET (5mg) BY MOUTH ONCE DAILY (Patient not taking: Reported on 7/1/2025) 30 tablet 3     No current facility-administered medications on file prior to visit.   [3]   Patient Active Problem List  Diagnosis    Hiatal hernia with GERD    History of depression    Hyperlipidemia    Seasonal allergies    Type 2 diabetes mellitus with hyperglycemia, without long-term current use of insulin    History of esophageal stricture    Horseshoe kidney with renal calculus    Chronic left shoulder pain

## 2025-07-02 DIAGNOSIS — E11.65 TYPE 2 DIABETES MELLITUS WITH HYPERGLYCEMIA, WITHOUT LONG-TERM CURRENT USE OF INSULIN: ICD-10-CM

## 2025-07-02 RX ORDER — BLOOD-GLUCOSE SENSOR
1 EACH MISCELLANEOUS
Qty: 1 EACH | Refills: 3 | Status: SHIPPED | OUTPATIENT
Start: 2025-07-02

## 2025-07-02 RX ORDER — BLOOD-GLUCOSE SENSOR
1 EACH MISCELLANEOUS
COMMUNITY
End: 2025-07-02 | Stop reason: SDUPTHER

## 2025-07-07 ENCOUNTER — APPOINTMENT (OUTPATIENT)
Dept: GASTROENTEROLOGY | Facility: CLINIC | Age: 50
End: 2025-07-07
Payer: COMMERCIAL

## 2025-07-07 VITALS — HEIGHT: 65 IN | BODY MASS INDEX: 29.92 KG/M2 | WEIGHT: 179.6 LBS

## 2025-07-07 DIAGNOSIS — K60.2 RECTAL FISSURE: Primary | ICD-10-CM

## 2025-07-07 DIAGNOSIS — R19.8 PAIN ASSOCIATED WITH DEFECATION: ICD-10-CM

## 2025-07-07 DIAGNOSIS — Z87.19 HISTORY OF RECTAL BLEEDING: ICD-10-CM

## 2025-07-07 PROCEDURE — 4010F ACE/ARB THERAPY RXD/TAKEN: CPT | Performed by: INTERNAL MEDICINE

## 2025-07-07 PROCEDURE — 3051F HG A1C>EQUAL 7.0%<8.0%: CPT | Performed by: INTERNAL MEDICINE

## 2025-07-07 PROCEDURE — 3008F BODY MASS INDEX DOCD: CPT | Performed by: INTERNAL MEDICINE

## 2025-07-07 PROCEDURE — 1036F TOBACCO NON-USER: CPT | Performed by: INTERNAL MEDICINE

## 2025-07-07 PROCEDURE — 99214 OFFICE O/P EST MOD 30 MIN: CPT | Performed by: INTERNAL MEDICINE

## 2025-07-07 RX ORDER — PRAMOXINE HYDROCHLORIDE 10 MG/G
AEROSOL, FOAM TOPICAL 2 TIMES DAILY PRN
Qty: 15 G | Refills: 1 | Status: SHIPPED | OUTPATIENT
Start: 2025-07-07 | End: 2025-07-17

## 2025-07-07 ASSESSMENT — ENCOUNTER SYMPTOMS
BLOOD IN STOOL: 1
EYES NEGATIVE: 1
RECTAL PAIN: 1
NEUROLOGICAL NEGATIVE: 1
HEMATOLOGIC/LYMPHATIC NEGATIVE: 1
CONSTITUTIONAL NEGATIVE: 1
RESPIRATORY NEGATIVE: 1
CARDIOVASCULAR NEGATIVE: 1
ENDOCRINE NEGATIVE: 1

## 2025-07-07 NOTE — PROGRESS NOTES
Subjective   Patient ID: Nubia Martinez is a 50 y.o. female who presents for New Patient Visit (Pt presents today as a new patient with complaints of rectal bleeding and anal fissures. Pt states she's been having this issues for over a year, but symptoms are getting worse. Pt states symptoms include anal itching, anal discomfort, gas, and occasional blood, that is mainly on the tissue. Pt states she's had a fissure before, about 25 years ago, that was surgically corrected. ).    HPI Don is seen today acutely.  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  Has used stool softeners with minimal improvement.  Has bleeding with every other bowel movement.    Review of Systems   Constitutional: Negative.    HENT: Negative.     Eyes: Negative.    Respiratory: Negative.     Cardiovascular: Negative.    Gastrointestinal:  Positive for blood in stool and rectal pain.   Endocrine: Negative.    Genitourinary: Negative.    Neurological: Negative.    Hematological: Negative.        Objective   Physical Exam  Constitutional:       General: She is awake.      Appearance: Normal appearance.   HENT:      Head: Normocephalic and atraumatic.      Nose: Nose normal.      Mouth/Throat:      Mouth: Mucous membranes are moist.   Eyes:      Pupils: Pupils are equal, round, and reactive to light.   Neck:      Thyroid: No thyroid mass.      Trachea: Phonation normal.   Cardiovascular:      Rate and Rhythm: Normal rate and regular rhythm.      Heart sounds: Normal heart sounds.   Pulmonary:      Effort: Pulmonary effort is normal. No respiratory distress.      Breath sounds: Normal air entry. No decreased breath sounds, wheezing, rhonchi or rales.   Abdominal:      General: Bowel sounds are normal. There is no distension.      Palpations: Abdomen is soft.      Tenderness: There is no abdominal tenderness.   Musculoskeletal:       Cervical back: Neck supple.      Right lower leg: No edema.      Left lower leg: No edema.   Skin:     General: Skin is warm.      Capillary Refill: Capillary refill takes less than 2 seconds.   Neurological:      General: No focal deficit present.      Mental Status: She is alert and oriented to person, place, and time. Mental status is at baseline.      Cranial Nerves: Cranial nerves 2-12 are intact.      Motor: Motor function is intact.   Psychiatric:         Attention and Perception: Attention and perception normal.         Mood and Affect: Mood normal.         Speech: Speech normal.         Behavior: Behavior normal.         Assessment/Plan   Diagnoses and all orders for this visit:  Rectal fissure  -     Referral to Colorectal Surgery; Future  -     pramoxine (Proctofoam) 1 % foam; Insert into the rectum 2 times a day as needed for hemorrhoids for up to 10 days.  History of rectal bleeding  -     Referral to Gastroenterology  Pain associated with defecation  -     Referral to Gastroenterology    Begin Colace 100 mg twice daily, Proctofoam refer for Botox injection with colorectal surgery.  Given her most recent colonoscopy in the last 3 years do not believe colonoscopy is necessary.  Will reevaluate after colorectal surgery evaluation.       Tomas Canales DO 07/07/25 3:35 PM

## 2025-08-01 ENCOUNTER — TELEPHONE (OUTPATIENT)
Age: 50
End: 2025-08-01
Payer: COMMERCIAL

## 2025-08-01 DIAGNOSIS — J30.2 SEASONAL ALLERGIES: ICD-10-CM

## 2025-08-01 DIAGNOSIS — E11.65 TYPE 2 DIABETES MELLITUS WITH HYPERGLYCEMIA, WITHOUT LONG-TERM CURRENT USE OF INSULIN: ICD-10-CM

## 2025-08-01 RX ORDER — CETIRIZINE HYDROCHLORIDE 10 MG/1
10 TABLET ORAL DAILY PRN
Qty: 90 TABLET | Refills: 1 | Status: SHIPPED | OUTPATIENT
Start: 2025-08-01

## 2025-08-01 NOTE — TELEPHONE ENCOUNTER
Called in just wanting to let PENNIE know the Lacie is working great. The side effects are minimal and her sugars have been consistently between 110-120. FYI.

## 2025-08-05 ENCOUNTER — TELEPHONE (OUTPATIENT)
Age: 50
End: 2025-08-05
Payer: COMMERCIAL

## 2025-08-05 DIAGNOSIS — E11.65 TYPE 2 DIABETES MELLITUS WITH HYPERGLYCEMIA, WITHOUT LONG-TERM CURRENT USE OF INSULIN: ICD-10-CM

## 2025-08-05 RX ORDER — BLOOD-GLUCOSE SENSOR
1 EACH MISCELLANEOUS
Qty: 4 EACH | Refills: 3 | Status: SHIPPED | OUTPATIENT
Start: 2025-08-05

## 2025-08-05 NOTE — TELEPHONE ENCOUNTER
KEZIA FROM [facility] CALLING, PATIENT REQUESTING  2  ZelgorYLE ANDREW SENSORS INSTEAD OF  1.    VERBAL OKAY GIVEN  TO CHANGE TO 2.  
Satisfactory

## 2025-08-12 ENCOUNTER — APPOINTMENT (OUTPATIENT)
Dept: RHEUMATOLOGY | Facility: CLINIC | Age: 50
End: 2025-08-12
Payer: COMMERCIAL

## 2025-08-12 VITALS
DIASTOLIC BLOOD PRESSURE: 78 MMHG | SYSTOLIC BLOOD PRESSURE: 111 MMHG | BODY MASS INDEX: 29.32 KG/M2 | WEIGHT: 176 LBS | HEART RATE: 103 BPM | HEIGHT: 65 IN

## 2025-08-12 DIAGNOSIS — M22.2X2 PATELLOFEMORAL SYNDROME OF BOTH KNEES: ICD-10-CM

## 2025-08-12 DIAGNOSIS — M65.20 CALCIFIC TENDONITIS: Primary | ICD-10-CM

## 2025-08-12 DIAGNOSIS — M22.2X1 PATELLOFEMORAL SYNDROME OF BOTH KNEES: ICD-10-CM

## 2025-08-12 PROCEDURE — 1036F TOBACCO NON-USER: CPT | Performed by: INTERNAL MEDICINE

## 2025-08-12 PROCEDURE — 99204 OFFICE O/P NEW MOD 45 MIN: CPT | Performed by: INTERNAL MEDICINE

## 2025-08-12 PROCEDURE — 3074F SYST BP LT 130 MM HG: CPT | Performed by: INTERNAL MEDICINE

## 2025-08-12 PROCEDURE — 3008F BODY MASS INDEX DOCD: CPT | Performed by: INTERNAL MEDICINE

## 2025-08-12 PROCEDURE — 3051F HG A1C>EQUAL 7.0%<8.0%: CPT | Performed by: INTERNAL MEDICINE

## 2025-08-12 PROCEDURE — 3078F DIAST BP <80 MM HG: CPT | Performed by: INTERNAL MEDICINE

## 2025-08-12 PROCEDURE — 4010F ACE/ARB THERAPY RXD/TAKEN: CPT | Performed by: INTERNAL MEDICINE

## 2025-08-12 RX ORDER — MELOXICAM 15 MG/1
15 TABLET ORAL DAILY
Qty: 30 TABLET | Refills: 0 | Status: SHIPPED | OUTPATIENT
Start: 2025-08-12 | End: 2025-09-16

## 2025-08-18 ENCOUNTER — APPOINTMENT (OUTPATIENT)
Dept: RADIOLOGY | Facility: HOSPITAL | Age: 50
End: 2025-08-18
Payer: COMMERCIAL

## 2025-08-18 ENCOUNTER — TELEPHONE (OUTPATIENT)
Age: 50
End: 2025-08-18
Payer: COMMERCIAL

## 2025-08-26 ENCOUNTER — HOSPITAL ENCOUNTER (OUTPATIENT)
Dept: RADIOLOGY | Facility: CLINIC | Age: 50
Discharge: HOME | End: 2025-08-26
Payer: COMMERCIAL

## 2025-08-26 ENCOUNTER — HOSPITAL ENCOUNTER (OUTPATIENT)
Dept: RADIOLOGY | Facility: EXTERNAL LOCATION | Age: 50
Discharge: HOME | End: 2025-08-26

## 2025-08-26 ENCOUNTER — APPOINTMENT (OUTPATIENT)
Dept: ORTHOPEDIC SURGERY | Facility: CLINIC | Age: 50
End: 2025-08-26
Payer: COMMERCIAL

## 2025-08-26 DIAGNOSIS — M25.512 BILATERAL SHOULDER PAIN, UNSPECIFIED CHRONICITY: ICD-10-CM

## 2025-08-26 DIAGNOSIS — M25.511 BILATERAL SHOULDER PAIN, UNSPECIFIED CHRONICITY: ICD-10-CM

## 2025-08-26 PROCEDURE — 73030 X-RAY EXAM OF SHOULDER: CPT | Mod: 50

## 2025-08-26 PROCEDURE — 73030 X-RAY EXAM OF SHOULDER: CPT | Mod: BILATERAL PROCEDURE | Performed by: RADIOLOGY

## 2025-08-26 RX ORDER — TRIAMCINOLONE ACETONIDE 40 MG/ML
40 INJECTION, SUSPENSION INTRA-ARTICULAR; INTRAMUSCULAR
Status: COMPLETED | OUTPATIENT
Start: 2025-08-26 | End: 2025-08-26

## 2025-08-26 RX ADMIN — TRIAMCINOLONE ACETONIDE 40 MG: 40 INJECTION, SUSPENSION INTRA-ARTICULAR; INTRAMUSCULAR at 16:03

## 2025-08-26 ASSESSMENT — PAIN - FUNCTIONAL ASSESSMENT: PAIN_FUNCTIONAL_ASSESSMENT: 0-10

## 2025-08-26 ASSESSMENT — PAIN DESCRIPTION - DESCRIPTORS: DESCRIPTORS: STABBING;SHARP;DULL

## 2025-08-26 ASSESSMENT — PAIN SCALES - GENERAL
PAINLEVEL_OUTOF10: 5 - MODERATE PAIN
PAINLEVEL_OUTOF10: 5 - MODERATE PAIN

## 2025-08-29 DIAGNOSIS — E11.65 TYPE 2 DIABETES MELLITUS WITH HYPERGLYCEMIA, WITHOUT LONG-TERM CURRENT USE OF INSULIN: ICD-10-CM

## 2025-08-29 RX ORDER — TIRZEPATIDE 2.5 MG/.5ML
2.5 INJECTION, SOLUTION SUBCUTANEOUS WEEKLY
Qty: 2 ML | Refills: 3 | Status: CANCELLED | OUTPATIENT
Start: 2025-08-29

## 2025-09-01 DIAGNOSIS — E11.65 TYPE 2 DIABETES MELLITUS WITH HYPERGLYCEMIA, WITHOUT LONG-TERM CURRENT USE OF INSULIN: ICD-10-CM

## 2025-09-02 ENCOUNTER — TELEPHONE (OUTPATIENT)
Age: 50
End: 2025-09-02
Payer: COMMERCIAL

## 2025-09-02 RX ORDER — METFORMIN HYDROCHLORIDE 500 MG/1
1000 TABLET, EXTENDED RELEASE ORAL
Qty: 360 TABLET | Refills: 1 | Status: SHIPPED | OUTPATIENT
Start: 2025-09-02 | End: 2026-09-02

## 2025-09-02 RX ORDER — TIRZEPATIDE 2.5 MG/.5ML
2.5 INJECTION, SOLUTION SUBCUTANEOUS WEEKLY
Qty: 13 PEN | Refills: 3 | Status: SHIPPED | OUTPATIENT
Start: 2025-09-02

## 2025-09-09 ENCOUNTER — APPOINTMENT (OUTPATIENT)
Dept: ORTHOPEDIC SURGERY | Facility: CLINIC | Age: 50
End: 2025-09-09
Payer: COMMERCIAL

## 2025-10-02 ENCOUNTER — APPOINTMENT (OUTPATIENT)
Age: 50
End: 2025-10-02
Payer: COMMERCIAL

## 2025-10-06 ENCOUNTER — APPOINTMENT (OUTPATIENT)
Dept: GASTROENTEROLOGY | Facility: CLINIC | Age: 50
End: 2025-10-06
Payer: COMMERCIAL

## (undated) DEVICE — GLOVE, SURGICAL, PROTEXIS PI BLUE W/NEUTHERA, 7.5, PF, LF

## (undated) DEVICE — DRESSING, TRANSPARENT, TEGADERM, 4 X 10 IN

## (undated) DEVICE — PAD, SANITARY, MAXI, U BY KOTEX, REG, LF

## (undated) DEVICE — CIRCUIT, BREATHING, ADULT, B/V FILTER, HMEF, 3 L BAG, 108 IN

## (undated) DEVICE — SUTURE, VICRYL, 2-0, 36 IN, CT-1, VIOLET

## (undated) DEVICE — TOWEL, OR, XRAY DECTECTABLE, 17 X 27, BLUE, STERILE

## (undated) DEVICE — HOLDER, CATHETER, CENTURION, WHITE, ADHESIVE, NS

## (undated) DEVICE — CANISTER, SUCTION, 3000CC, W/COVER NON ATTACHED, 18IN TUBES

## (undated) DEVICE — SUTURE, VICRYL, 2-0, 54 IN, CTD, UNDYED

## (undated) DEVICE — STRAP, KNEE AND BODY, SINGLE USE

## (undated) DEVICE — CAUTERY, PENCIL, PUSH BUTTON, SMOKE EVAC, 70MM

## (undated) DEVICE — DRAPE, SURGICAL, UNDERBUTTOCKS, DISP

## (undated) DEVICE — MASK, FACE, ANESTHESIA, ADULT, LARGE, P6, RED

## (undated) DEVICE — GLOVE, PROTEXIS PI CLASSIC, SZ-7.5, PF, LF

## (undated) DEVICE — TIP, SUCTION, YANKAUER, BULB, ADULT

## (undated) DEVICE — Device

## (undated) DEVICE — SOLUTION, IRRIGATION, STERILE WATER, 1000 ML, POUR BOTTLE

## (undated) DEVICE — SUTURE, VICRYL 0, 36 IN, CT-1, VIOLET

## (undated) DEVICE — SUTURE, PDS II, 0 36 IN, CT-1, VIOLET

## (undated) DEVICE — SET, TUBE INFLOW AQUILEX

## (undated) DEVICE — SPONGE, LAP, XRAY DECT, 18IN X 18IN, W/MASTER DMT, STERILE

## (undated) DEVICE — PREP, SKIN, BETADINE SOLUTION, 1/2 OZ

## (undated) DEVICE — SOLUTION, IRRIGATION, SODIUM CHLORIDE 0.9%, 1000 ML, POUR BOTTLE

## (undated) DEVICE — SPONGE, GAUZE, XRAY DECT, 16 PLY, 4 X 4, W/MASTER DMT,STERILE

## (undated) DEVICE — PREP TRAY, VAGINAL

## (undated) DEVICE — STRAP, ARMBOARD, 3IN, BLUE/WHITE, SECURE HOOK

## (undated) DEVICE — APPLICATOR, CHLORAPREP, W/ORANGE TINT, 26ML

## (undated) DEVICE — SUTURE, VICRYL, 0, 18 IN, CT-1, VIOLET

## (undated) DEVICE — TUBING, SUCTION, NON-CONDUCTIVE, FEMALE, 6.4MM X 10, STERILE

## (undated) DEVICE — LINE, GAS SAMPLING, .05IN 1D 10FT, M/M CONNECTORS

## (undated) DEVICE — SPONGE, DISSECTING, PEANUT, PEEL, STERILE 5

## (undated) DEVICE — TIP, SUCTION, POOLEHANDPIECE, POOLE SUCTION, W/VENT, 14-28FR

## (undated) DEVICE — DRESSING, ABDOMINAL, TENDERSORB, 8 X 7-1/2 IN, STERILE

## (undated) DEVICE — CATHETER TRAY, SURESTEP, 16FR, URINE METER W/STATLOCK

## (undated) DEVICE — DRESSING, NON-ADHERENT, TELFA, OUCHLESS, 3 X 8 IN, STERILE

## (undated) DEVICE — STAPLER, SKIN, ROTATING HEAD, PROXIMATE, WIDE, 35

## (undated) DEVICE — SUTURE, VICRYL, 0, 8 X 27 IN, CT-1, VIOLET CR

## (undated) DEVICE — SLEEVE, SUCTION, E-SEP, 165MM